# Patient Record
Sex: MALE | Race: WHITE | NOT HISPANIC OR LATINO | Employment: OTHER | URBAN - METROPOLITAN AREA
[De-identification: names, ages, dates, MRNs, and addresses within clinical notes are randomized per-mention and may not be internally consistent; named-entity substitution may affect disease eponyms.]

---

## 2017-05-03 ENCOUNTER — RECORDS - HEALTHEAST (OUTPATIENT)
Dept: LAB | Facility: CLINIC | Age: 67
End: 2017-05-03

## 2017-05-03 LAB
CHOLEST SERPL-MCNC: 121 MG/DL
FASTING STATUS PATIENT QL REPORTED: YES
HDLC SERPL-MCNC: 34 MG/DL
LDLC SERPL CALC-MCNC: 66 MG/DL
PSA SERPL-MCNC: 0.6 NG/ML (ref 0–4.5)
TRIGL SERPL-MCNC: 103 MG/DL

## 2017-05-04 ENCOUNTER — RECORDS - HEALTHEAST (OUTPATIENT)
Dept: LAB | Facility: CLINIC | Age: 67
End: 2017-05-04

## 2017-05-04 LAB
HCV AB SERPL QL IA: NEGATIVE
HIV 1+2 AB+HIV1 P24 AG SERPL QL IA: NEGATIVE

## 2017-09-13 ENCOUNTER — RECORDS - HEALTHEAST (OUTPATIENT)
Dept: ADMINISTRATIVE | Facility: OTHER | Age: 67
End: 2017-09-13

## 2017-09-13 ENCOUNTER — COMMUNICATION - HEALTHEAST (OUTPATIENT)
Dept: SCHEDULING | Facility: CLINIC | Age: 67
End: 2017-09-13

## 2018-05-18 ENCOUNTER — COMMUNICATION - HEALTHEAST (OUTPATIENT)
Dept: SCHEDULING | Facility: CLINIC | Age: 68
End: 2018-05-18

## 2018-06-14 ENCOUNTER — RECORDS - HEALTHEAST (OUTPATIENT)
Dept: LAB | Facility: CLINIC | Age: 68
End: 2018-06-14

## 2018-06-14 LAB
ALBUMIN SERPL-MCNC: 4 G/DL (ref 3.5–5)
ALP SERPL-CCNC: 88 U/L (ref 45–120)
ALT SERPL W P-5'-P-CCNC: 33 U/L (ref 0–45)
ANION GAP SERPL CALCULATED.3IONS-SCNC: 11 MMOL/L (ref 5–18)
AST SERPL W P-5'-P-CCNC: 21 U/L (ref 0–40)
BILIRUB SERPL-MCNC: 0.3 MG/DL (ref 0–1)
BUN SERPL-MCNC: 13 MG/DL (ref 8–22)
CALCIUM SERPL-MCNC: 9.6 MG/DL (ref 8.5–10.5)
CHLORIDE BLD-SCNC: 106 MMOL/L (ref 98–107)
CHOLEST SERPL-MCNC: 128 MG/DL
CO2 SERPL-SCNC: 25 MMOL/L (ref 22–31)
CREAT SERPL-MCNC: 1.12 MG/DL (ref 0.7–1.3)
FASTING STATUS PATIENT QL REPORTED: NO
GFR SERPL CREATININE-BSD FRML MDRD: >60 ML/MIN/1.73M2
GLUCOSE BLD-MCNC: 207 MG/DL (ref 70–125)
HDLC SERPL-MCNC: 36 MG/DL
LDLC SERPL CALC-MCNC: 71 MG/DL
POTASSIUM BLD-SCNC: 4.6 MMOL/L (ref 3.5–5)
PROT SERPL-MCNC: 6.9 G/DL (ref 6–8)
PSA SERPL-MCNC: 0.3 NG/ML (ref 0–4.5)
SODIUM SERPL-SCNC: 142 MMOL/L (ref 136–145)
TRIGL SERPL-MCNC: 105 MG/DL

## 2019-09-03 ENCOUNTER — RECORDS - HEALTHEAST (OUTPATIENT)
Dept: LAB | Facility: CLINIC | Age: 69
End: 2019-09-03

## 2019-09-03 LAB
ALBUMIN SERPL-MCNC: 4 G/DL (ref 3.5–5)
ALP SERPL-CCNC: 79 U/L (ref 45–120)
ALT SERPL W P-5'-P-CCNC: 28 U/L (ref 0–45)
ANION GAP SERPL CALCULATED.3IONS-SCNC: 15 MMOL/L (ref 5–18)
AST SERPL W P-5'-P-CCNC: 24 U/L (ref 0–40)
BILIRUB SERPL-MCNC: 0.4 MG/DL (ref 0–1)
BUN SERPL-MCNC: 13 MG/DL (ref 8–22)
CALCIUM SERPL-MCNC: 9.7 MG/DL (ref 8.5–10.5)
CHLORIDE BLD-SCNC: 104 MMOL/L (ref 98–107)
CHOLEST SERPL-MCNC: 191 MG/DL
CO2 SERPL-SCNC: 20 MMOL/L (ref 22–31)
CREAT SERPL-MCNC: 1.13 MG/DL (ref 0.7–1.3)
FASTING STATUS PATIENT QL REPORTED: NO
GFR SERPL CREATININE-BSD FRML MDRD: >60 ML/MIN/1.73M2
GLUCOSE BLD-MCNC: 234 MG/DL (ref 70–125)
HDLC SERPL-MCNC: 27 MG/DL
LDLC SERPL CALC-MCNC: 125 MG/DL
POTASSIUM BLD-SCNC: 5.2 MMOL/L (ref 3.5–5)
PROT SERPL-MCNC: 7.2 G/DL (ref 6–8)
PSA SERPL-MCNC: 0.3 NG/ML (ref 0–4.5)
SODIUM SERPL-SCNC: 139 MMOL/L (ref 136–145)
TRIGL SERPL-MCNC: 197 MG/DL

## 2020-04-28 ENCOUNTER — RECORDS - HEALTHEAST (OUTPATIENT)
Dept: LAB | Facility: CLINIC | Age: 70
End: 2020-04-28

## 2020-04-28 LAB
ANION GAP SERPL CALCULATED.3IONS-SCNC: 13 MMOL/L (ref 5–18)
BUN SERPL-MCNC: 26 MG/DL (ref 8–22)
CALCIUM SERPL-MCNC: 9.4 MG/DL (ref 8.5–10.5)
CHLORIDE BLD-SCNC: 104 MMOL/L (ref 98–107)
CO2 SERPL-SCNC: 25 MMOL/L (ref 22–31)
CREAT SERPL-MCNC: 1.28 MG/DL (ref 0.7–1.3)
GFR SERPL CREATININE-BSD FRML MDRD: 56 ML/MIN/1.73M2
GLUCOSE BLD-MCNC: 298 MG/DL (ref 70–125)
POTASSIUM BLD-SCNC: 3.7 MMOL/L (ref 3.5–5)
SODIUM SERPL-SCNC: 142 MMOL/L (ref 136–145)

## 2020-04-30 ENCOUNTER — ALLIED HEALTH/NURSE VISIT (OUTPATIENT)
Dept: PHARMACY | Facility: PHYSICIAN GROUP | Age: 70
End: 2020-04-30
Payer: COMMERCIAL

## 2020-04-30 DIAGNOSIS — M54.9 BACK PAIN, UNSPECIFIED BACK LOCATION, UNSPECIFIED BACK PAIN LATERALITY, UNSPECIFIED CHRONICITY: ICD-10-CM

## 2020-04-30 DIAGNOSIS — Z79.4 TYPE 2 DIABETES MELLITUS WITHOUT COMPLICATION, WITH LONG-TERM CURRENT USE OF INSULIN (H): Primary | ICD-10-CM

## 2020-04-30 DIAGNOSIS — E78.5 HYPERLIPIDEMIA LDL GOAL <100: ICD-10-CM

## 2020-04-30 DIAGNOSIS — F31.9 BIPOLAR 1 DISORDER (H): ICD-10-CM

## 2020-04-30 DIAGNOSIS — J45.909 UNCOMPLICATED ASTHMA, UNSPECIFIED ASTHMA SEVERITY, UNSPECIFIED WHETHER PERSISTENT: ICD-10-CM

## 2020-04-30 DIAGNOSIS — F51.05 INSOMNIA DUE TO OTHER MENTAL DISORDER: ICD-10-CM

## 2020-04-30 DIAGNOSIS — E11.9 TYPE 2 DIABETES MELLITUS WITHOUT COMPLICATION, WITH LONG-TERM CURRENT USE OF INSULIN (H): Primary | ICD-10-CM

## 2020-04-30 DIAGNOSIS — I10 BENIGN ESSENTIAL HYPERTENSION: ICD-10-CM

## 2020-04-30 DIAGNOSIS — F99 INSOMNIA DUE TO OTHER MENTAL DISORDER: ICD-10-CM

## 2020-04-30 PROCEDURE — 99605 MTMS BY PHARM NP 15 MIN: CPT | Performed by: PHARMACIST

## 2020-04-30 PROCEDURE — 99607 MTMS BY PHARM ADDL 15 MIN: CPT | Performed by: PHARMACIST

## 2020-04-30 RX ORDER — OLANZAPINE 10 MG/1
10 TABLET ORAL AT BEDTIME
COMMUNITY

## 2020-04-30 RX ORDER — GABAPENTIN 400 MG/1
1200 CAPSULE ORAL 2 TIMES DAILY
COMMUNITY

## 2020-04-30 RX ORDER — TRAZODONE HYDROCHLORIDE 150 MG/1
300 TABLET ORAL AT BEDTIME
COMMUNITY

## 2020-04-30 RX ORDER — LAMOTRIGINE 100 MG/1
300 TABLET ORAL EVERY EVENING
COMMUNITY

## 2020-04-30 RX ORDER — VENLAFAXINE HYDROCHLORIDE 75 MG/1
225 CAPSULE, EXTENDED RELEASE ORAL DAILY
COMMUNITY

## 2020-04-30 RX ORDER — MONTELUKAST SODIUM 10 MG/1
10 TABLET ORAL AT BEDTIME
COMMUNITY

## 2020-04-30 RX ORDER — ARIPIPRAZOLE 20 MG/1
20 TABLET ORAL EVERY EVENING
COMMUNITY

## 2020-04-30 RX ORDER — METFORMIN HCL 500 MG
500 TABLET, EXTENDED RELEASE 24 HR ORAL 2 TIMES DAILY WITH MEALS
COMMUNITY

## 2020-04-30 RX ORDER — ATORVASTATIN CALCIUM 10 MG/1
10 TABLET, FILM COATED ORAL EVERY EVENING
COMMUNITY

## 2020-04-30 RX ORDER — LISINOPRIL 10 MG/1
20 TABLET ORAL DAILY
COMMUNITY
End: 2024-06-28

## 2020-04-30 RX ORDER — ZOLPIDEM TARTRATE 10 MG/1
5 TABLET ORAL AT BEDTIME
Status: ON HOLD | COMMUNITY
End: 2023-01-02

## 2020-04-30 RX ORDER — ESCITALOPRAM OXALATE 20 MG/1
20 TABLET ORAL EVERY EVENING
COMMUNITY

## 2020-04-30 NOTE — PROGRESS NOTES
MTM ENCOUNTER  SUBJECTIVE/OBJECTIVE:                           Montez Cabrera is a 69 year old male called for a transitions of care visit. He was discharged from [facility]  to  acute respiratory failure with hypoxia, acute asthma exacerbation. {mtisitdetails:988975}     Wife angie    Medications   Name strength formulation, Sig: take route frequency   Increase glimepiride 4 mg tablet, Si tab(s) orally 2 times daily (before meals) Start Date: 2019 Stop Date: 2021    Continue Lantus Solostar Pen 100 units/mL solution, Si units subcutaneously 2 times a day (morning and bedtime) Start Date: 10/29/2019    Continue Metformin Hydrochloride  Tablet 24 Hour Sustained Release, Sig: TAKE 2 TABLETS BY MOUTH TWICE DAILY 2 tabs AM and PM     Start guaifenesin 100 mg/5 mL liquid, Sig: 10 ml orally every 4 hours as needed for cough Start Date: 2020    Taking trazodone 150mg tablet, Si tabs orally once a day at bed time    Taking One Touch Ultra lancets lancets, Sig: as directed once daily    Taking one touch ultra 2 glucometer , Sig: as directed Start Date: 2013    Taking olanzapine 10 mg tablet, Si tab(s) orally once a day at bedtime    Taking Novofine as directed UF pen needles 32g x 4, Sig: as directed as directed Start Date: 2017    Taking Neurontin 400mg capsule, Sig: 3 caps orally 2 times a day (morning and bedtime)    Taking naproxen 500 MG Tablet, Si tab(s) orally 2 times a day    Taking Lipitor 10 Tablet, Si tab(s) orally once a day    Taking Lexapro 20 mg tablet, Si tab(s) orally once a day    Taking Lamictal 100mg , Sig: 3 tabs daily at bedtime    Taking Effexor XR 75 mg capsule, extended release, Si tabs in morning and 1 tab at bedtime orally     Taking DME:Nebulizer , Sig: as directed Start Date: 2018    Taking Diabetic Test Strips - Strips, Sig: as directed once daily    Taking Ambien 10 mg , Si/2 tab Oral daily at bedtime   "  Taking Abilify 20 mg tablet, Si tab(s) orally once a day Start Date: 2020    Continue lisinopril 10 mg tablet, Si tab(s) orally once a day in morning Start Date: 2020    Continue montelukast 10 mg tablet, Si tab(s) orally once a day    Continue prednisone 20 mg tablet, Si tabs orally once a day with meal Start Date: 2020 Stop Date: 2020    Continue Albuterol solution for nebulizer 0.083% premix neb solution, Sig: 3 ml inhaled Q6H as needed for cough      1. Hospital Follow [facility] /asthma. 2. DIABETES CHECK DUE- had an increase of Lantus to 25 units twice daily. 3. Hypertension: Started Lisinopril because of diabetes- CHECK BMP TODAY. 4. Hyperlipidemia- restarted Lipitor. 5. Discuss insulin. 6. Phelgm in the throat.   Patient with type 2 diabetes, questionable control previously with last A1c of 8.8 on 2019- very high sugars in the hospital. Taking metformin 2 tabs twice daily, glimepride 1 tab am, 1/2 tab pm, and Lantus. THe lantus was titrated up from 10 units to 25 units prior to hospitalization, then to 25 units twice daily in the hospital Sugars have been up and down at home 100-300 depending on timing.        Patient consented to a telehealth visit: {YES(DEFAULT)/NO/MULT:929881::\"yes\"}  Telemedicine Visit Details  [facility] of service:  [facility]::\"Telephone visit\"}  Start Time: {video/phone visit start time:1529}  End Time: {video/phone visit end time:1529}  Originating Location (pt. Location): {video visit patient location:962064::\"Home\"}  Distant Location (provider location):  [facility]  Mode of Communication:  {telemedmtm2:657931::\"Telephone\"}    Chief Complaint: ***.  Meghan is wondering what time of day he should take his medications.  Personal Healthcare Goals: ***    Allergies/ADRs: Erythromycin - diarrhea, Guy-Dur - stomach upset, Wellbutrin - agressive     Tobacco:  has no history on file for " tobacco.{Tobacco Cessation needed for ACO -- Delete if patient is a non-smoker:006052}  Alcohol: {ALCOHOL CONSUMPTION HX:355942}  Caffeine: {CAFFEINE INTAKE:882604}  Activity: ***  PMH: {1/2/3/4/5:895204}    Medication Adherence/Access:   Pt takes his AM meds around 10:30-12, evening meds would be bedtime.  Pt doesn't eat much, usually just a small bar   Patient {medadmin:276937}.      HPI:  Pt presented to ED with dypsnea.  Pt was diagnosed with acute respiratory failure with hypoxia secondary to asthma exacerbation.  His chest x-ray was negative for pneumonia., was negative for COVID19.  Pt improved on nebs, oxygen support, and IV prednisone.  Was switched to an oral prednisone taper. Pt had chest pains, but was likely secondary to coughing. Echo was unremarkable with EF of 60%.  Pt's BP was elevated in the hospital, and was started on lisinopril with diabetes.    - Elevated hemoglobin A1c to 9.2  - Worsening blood sugar today because of steroid  - Change sliding scale to resistant  - Add carb counting insulin coverage  - Increase Lantus to 25 units twice daily        [facility] 4/23 to 4/25 acute respiratory failure with hypoxia, acute asthma exacerbation, chest pain, DM 2, HTN.  START MEDS: lisinopril, prednisone.  CHANGE MEDS: lantus.  Monitor blood sugar and BP outpatient, follow up with Pulmonology, PCP with BMP and MTM referral.  Discharged home.     ***: ***  ***: ***  ***: ***  ***: ***  ***: ***    Today's Vitals: There were no vitals taken for this visit.      ASSESSMENT:                          {mtmpartdquestion:035526}    Medication Adherence: {adherenceassess:859769}, {ADHERENCEOPTIONSASSES:370553}    ***: ***  ***: ***  ***: ***  ***: ***  ***: ***    PLAN:                          {Remind patient about MTM survey:818321}{AL?:128489}  ***    I spent {time:117326} with this patient today{MTMpartdbillingquestion:841275}. { :106835}. A copy of the visit note was provided to the patient's {ccd  "chart:360824} provider.    Will follow up in ***.    The patient {GIVEN/NOT GIVEN:994692::\"was given\"} a summary of these recommendations. {covisit:425522}    ***          "

## 2020-05-05 RX ORDER — GLIMEPIRIDE 4 MG/1
4 TABLET ORAL 2 TIMES DAILY
Status: ON HOLD | COMMUNITY
End: 2023-01-02

## 2020-05-05 RX ORDER — ALBUTEROL SULFATE 90 UG/1
2 AEROSOL, METERED RESPIRATORY (INHALATION) 2 TIMES DAILY PRN
COMMUNITY

## 2020-05-05 NOTE — PROGRESS NOTES
[facility] ENCOUNTER  SUBJECTIVE/OBJECTIVE:                           Montez Cabrera is a 69 year old male called for a transitions of care visit. He was discharged from [facility] 4/23 to 4/25 acute respiratory failure with hypoxia, acute asthma exacerbation. Patient was accompanied by his wife Meghan.     Patient consented to a telehealth visit: yes  Telemedicine Visit Details  Type of service:  Telephone visit  Start Time: 8:31  End Time: 9:27  Originating Location (pt. Location): Home  Distant Location (provider location):  [facility]  Mode of Communication:  Telephone    Chief Complaint: Medication review after hospital discharge.  Meghan is wondering what time of day he should take his medications.  Patient is feeling very groggy/tired after taking his morning medications.    Allergies/ADRs: Erythromycin - diarrhea, Guy-Dur - stomach upset, Wellbutrin - agressive   Tobacco: Quit in his 20's, smoked for about 8 years.   Alcohol: not currently using  Caffeine: not discussed  PMH: Reviewed in ECW    Medication Adherence/Access:   Pt takes his AM meds around 10:30-12, evening meds would be bedtime.  Pt doesn't eat much, usually just a small snack bar.    Patient uses pill box(es).  His wife Meghan sets them up for him.  He sometimes forgets the morning doses of medications.        HPI:  Pt presented to ED with dypsnea.  Pt was diagnosed with acute respiratory failure with hypoxia secondary to asthma exacerbation.  His chest x-ray was negative for pneumonia., was negative for COVID19.  Pt improved on nebs, oxygen support, and IV prednisone.  Was switched to an oral prednisone taper. Pt had chest pains, but was likely secondary to coughing. Echo was unremarkable with EF of 60%.  Pt's BP was elevated in the hospital, and was started on lisinopril with diabetes.    - Elevated hemoglobin A1c to 9.2  - Worsening blood sugar today because of steroid  - Change sliding scale to resistant  - Add carb  counting insulin coverage  - Increase Lantus to 25 units twice daily  - Pt was started on lisinopril and prednisone at discharge.    Type 2 Diabetes:    Pt currently taking Metformin ER 1000 mg twice daily, Glimepiride 4 mg twice daily, Lantus 25 units twice daily. Pt is not experiencing side effects.   Pt was on the Ozempic sample for about 6 weeks at the end of last year, but it was too expensive to continue.  SMB time(s) daily. Ranges (patient reported):  AM fasting.  2 hour post prandial: 200-300's, after dinner or lunch (spoke to pt about this on )  Symptoms of low blood sugar? shaky, dizzy, weak, sweaty, Frequency of lows- no symptoms recently.   Symptoms of high blood sugar? none  Eye exam: due  Foot exam: due  Diet/Exercise: Dinner - varies a lot.  Has different types of meat, a potatoes, a salad, veggie.  He likes sweets, and doesn't have a lot of will power.  He is hungry all of the time.  She is thinking about doing Nutrisystem for diabetic patients for herself and Montez.  She is doing   Statin: Yes: Atorvastatin 10 mg   ACEi/ARB: Yes: Lisinopril 10 mg daily.   Aspirin:  Patient isn't taking aspirin, they don't remember why it was stopped.  Will review patient's medication history.      GLYCOSYLATED HGB A1C - 2020      NAME VALUE REFERENCE RANGE LAB   F HGB A1C 9.1 H         Back Pain:   Currently taking Gabapentin 1200 mg twice daily.  Pt has found this very helpful for his pain.  Reports sedation in the morning after taking it.  Patient is wondering if he could take a higher dose at bedtime instead to help improve the tiredness during the day.  They were thinking they could possibly move 1 of the 400 mg capsules only to the bedtime dose.    Asthma:    Current asthma medications: Short-Acting Bronchodilator: Albuterol MDI 1 puff twice daily, Ipratropium MDI 1 puff twice daily.  Asthma triggers include: smoke, pollens, animal dander and strong odors and fumes.  Patient's wife Meghan  requests refills of the Albuterol and Atrovent inhalers.  She Atrovent wasn't listed on the discharge summary, but she checked on the name of the inhaler to see what medication it was.  Patient is also taking montelukast 10 mg daily at bedtime.  Breathing is a lot better with the prednisone, which is now completed. He is still getting phlegm in his throat.  He usually get breathing issue in the spring.  He is noticing some small amount draining his.    Pt reports the following symptoms: none.  AAP on file: YES    Bipolar 1 Depression/Insomnia:    Current medications include: Escitalopram 20 mg once daily, Venlafaxine 150mg AM and 75 mg PM, zolpidem 5 mg at bedtime, trazodone 300 mg at bedtime,  lamotrigine 300 mg daily at bedtime, olanzapine 10 mg at bedtime, and aripiprazole 20 mg at bedtime.  Pt reports that depression symptoms are stable.  Denies symptoms of uziel, decreased need for sleep, or anxiety.  They have spoken to the psychiatrist in the past about possibility to reduce the number of medications.  One possibility was to transition to just one antipsychotic medication or one antidepressant medication, however this regimen seems to have worked best for stabilizing mood and at this time they would not like to change his medications.  Patient reports likely side effect of olanzapine: Has increased hunger, weight gain, and just never really feels full after meals.    No flowsheet data found.     Hyperlipidemia:   Current therapy includes Atorvastatin 10mg once daily.  Pt reports no significant myalgias or other side effects.  ASCVD 10 year risk:  42.7%    LIPID CASCADE - 09/03/2019      NAME VALUE REFERENCE RANGE    CHOLESTEROL 191 <=199 (mg/dL)    TRIGLYCERIDES 197 H <=149 (mg/dL)    HDL CHOLESTEROL 27 L >=40 (mg/dL)    LDL CHOLESTEROL CALCULATED 125 <=129 (mg/dL)    FASTING? No        Hypertension:   Current medications include lisinopril 10 mg daily.  This was started a few days ago after his hospital  discharge.  Dr. Romero's appointment they be checked BMP after starting lisinopril.  Patient does not self-monitor BP.  Patient reports no current medication side effects.  No dry cough or throat irritation, however just recently started this medication.  When he does have a cough it is productive typically associated with his asthma.      BASIC METABOLIC PROFILE - 04/28/2020       NAME VALUE REFERENCE RANGE    SODIUM 142 136-145 (mmol/L)    POTASSIUM 3.7 3.5-5.0 (mmol/L)    CHLORIDE 104  (mmol/L)    CO2 25 22-31 (mmol/L)    ANION GAP, CALCULATION 13 5-18 (mmol/L)    GLUCOSE 298 H  (mg/dL)    CALCIUM 9.4 8.5-10.5 (mg/dL)    BLOOD UREA NITROGEN 26 H 8-22 (mg/dL)    CREATININE 1.28 0.70-1.30 (mg/dL)    GFR MDRD AF AMER >60 >60 (mL/min/1.73m2)    GFR MDRD NON AF AMER 56 L >60 (mL/min/1.73m2)       Last Vitals 4/28/2020:  Vitals: Ht 64, Wt 197, BMI 33.81, Comments HT/WT with shoes, Pulse 81, /62, Arm / Cuff size right large, Initials dv      Today's Vitals: There were no vitals taken for this visit. - telephone encounter.      ASSESSMENT:                              Medication Adherence: good, no issues identified      Type 2 Diabetes:   Needs Improvement. Patient is not meeting A1c goal of < 7%. Self monitoring of blood glucose is at goal of fasting  mg/dL. Pt would benefit from check 2 hours post prandial BG to see if those are elevated.  Pt's wife Meghan called a few days after initial apt to let me know 2 hr PP BG is running 200-300's.  They would like to make diet changes and monitor how this affects post prandial blood sugars.  Reviewed the Nutrisystem program available for diabetic patients.  This seems like it may be a helpful program if patient and his wife would like to do this.  It consists of smaller portions of meals throughout the day and with low-carb contents for each meal.  If diet changes are not an option to see significant improvement in postprandial blood sugars, it would  likely be beneficial to restart a GLP-1 agonist.  This would help improve his insatiable hunger, decreased appetite, help with weight loss, and postprandial/fasting blood sugars.  Most patients are able to get off of sulfonylureas or reduce her doses significantly.  Due for annual eye exam.  Aspirin therapy is indicated in this patient at dose of 81mg daily. Pt is not taking aspirin.  Reviewed patient's medical history and cannot confirm that aspirin was discontinued by a medical provider.  It was last noted as being on his medication list back in 2011 and at that time Dr. Romero had intended for patient to to continue taking.      Back Pain:   Needs improvement, possible side effect of gabapentin.  Patient's back pain is doing well on gabapentin however is very tired and groggy after taking the morning dose of gabapentin.  It would be okay for them to try moving one 400 mg capsule to bedtime instead to see if he is more alert during the day.  He would then be taking 800 mg in the morning, and 1600 mg at bedtime.  Discussed risks of taking higher doses of gabapentin at one time such as increased sedation, and peripheral edema.  If daytime sedation does not improve would recommend to switch back to his previous regimen of 1200 mg twice daily.    Asthma:   Needs Improvement.  Patient is currently taking too short acting medications, the albuterol and ipratropium MDI inhalers.  Although his breathing has improved he is still very symptomatic and coughing frequently.  If patient needs to continue taking both rescue inhalers on a daily basis may be beneficial for him to start medication therapy with a inhaled corticosteroid such as Flovent, Arnuity, Qvar, or Asmanex depending on insurance coverage.  Will discuss with primary care provider    Bipolar 1 Depression/Insomnia:    Stable.  Patient is taking a few duplicate therapies.  He is on 2 second-generation antipsychotics, and 2 antidepressants although one is a SSRI  and the other is an SNRI.  Olanzapine and Abilify are likely contributing to increased hunger and appetite as well as hyperlipidemia.  However as patient's mood is stable at this time, would not recommend changing his regimen.  Patient is also on high doses of several serotonergic medications: Escitalopram, trazodone, and venlafaxine.  Patient does not experiencing any symptoms of serotonin toxicity and is safe for him to continue with these medications.    Hyperlipidemia:   Needs Improvement. Pt is not on high intensity statin which is indicated based on 2019 ACC/AHA guidelines for lipid management.  Did not discuss this at appointment today, but it may be beneficial to increase the atorvastatin dose to 40 mg if tolerated.  Would recommend rechecking lipid panel after 2 to 3 months of being on higher dose.      Hypertension:   Improved, plan in place.  Patient just recently started lisinopril.  Serum creatinine and potassium were within normal limits when rechecked at follow-up appointment with PCP. Patient is meeting BP goal of < 140/90mmHg.       PLAN:                          Post Discharge Medication Reconciliation Status: discharge medications reconciled and changed, per note/orders (see AVS).    1.  Recommend patient and wife continue to work on diet changes.  They will continue to check 2-hour postprandial blood sugars for the next 2 weeks and then we will follow-up again on home readings.    2.  If postprandial blood sugars are still elevated at next follow-up would recommend starting a GLP-1 agonist, such as Ozempic 0.25 mg weekly for 4 weeks then increase to 2.5 mg once weekly.      3.  Patient requesting refills of Ventolin and Atrovent HFA inhalers.  Recommend also starting Flovent 110 MCG 1 puff twice daily.    4.  Recommend restarting aspirin 81 mg once daily    Future considerations:  5. Recommend consider increasing atorvastatin to 40 mg once daily, and rechecking lipid panel in 2 to 3 months.         I spent 60 minutes with this patient today. I offer these suggestions for consideration by Dr. Pelaez. A copy of the visit note was provided to the patient's primary care provider.    Will follow up in 2 weeks.    The patient was mailed a summary of these recommendations.     Staci Heard PharmD  Medication Therapy Management Pharmacist  Pager: [phone]        Note:  Also spoke with Meghan on 5/5 and added information on patient's 2 hour post prandial BG to my note above.

## 2020-05-05 NOTE — PATIENT INSTRUCTIONS
Recommendations from today's MTM visit:                                                    MTM (medication therapy management) is a service provided by a clinical pharmacist designed to help you get the most of out of your medicines.   Today we reviewed what your medicines are for, how to know if they are working, that your medicines are safe and how to make your medicine regimen as easy as possible.       I will talk to Dr. Pelaez about the below changes, and let you know if he agrees with the plan we discussed:    1.  Recommend continuing to work on diet changes.  Please continue to check blood sugars 2 hours after 1 meal per day for the next two weeks.  Then we will follow-up again on home readings.    2.  If your blood sugar readings are still elevated at next follow-up would recommend starting another medication, such as Ozempic 0.25 mg weekly for 4 weeks then increase to 2.5 mg once weekly.     You could check with your insurance company to see if Ozempic, Trulicity, or Victoza are better covered.  These are all medications but did the same drug class however Ozempic is typically the most effective for blood sugar reduction and effect on appetite.     3.  I will talk to Dr. Romero about refilling the Ventolin and Atrovent inhalers.  We discussed that these medications are typically just as needed medication for shortness of breath, and typically we would want to look at starting a longer acting controller medication such as Flovent or Qvar.  These inhalers are inhaled corticosteroid medications that help reduce inflammation that can happen in the lungs with asthma.    4.  Recommend restarting aspirin 81 mg once daily.  Reviewed your medical history and could not determine or find why aspirin was stopped.  I will discuss with Dr. Romero if he would like you to restart taking aspirin 81 mg.  This is recommended for diabetic patients to help reduce the risk of heart attack and stroke.          It was great  to speak with you today.  I value your experience and would be very thankful for your time with providing feedback on our clinic survey. You may receive a survey via email or text message in the next few days.     Next MTM visit: Telephone appointment on 5/21/2020 at 8:30 AM    To schedule another MTM appointment, please call the clinic directly or you may call the MTM scheduling line at [phone] or toll-free at [phone].     My Clinical Pharmacist's contact information:                                                      It was a pleasure talking with you today!  Please feel free to contact me with any questions or concerns you have.      Staci Heard, PharmD  Medication Therapy Management Pharmacist  Pager: [phone]

## 2020-08-05 ENCOUNTER — TRANSFERRED RECORDS (OUTPATIENT)
Dept: HEALTH INFORMATION MANAGEMENT | Facility: CLINIC | Age: 70
End: 2020-08-05

## 2020-08-05 LAB — HBA1C MFR BLD: 8 % (ref 4.2–6.1)

## 2020-12-16 ENCOUNTER — RECORDS - HEALTHEAST (OUTPATIENT)
Dept: LAB | Facility: CLINIC | Age: 70
End: 2020-12-16

## 2020-12-16 LAB
ALBUMIN SERPL-MCNC: 4.1 G/DL (ref 3.5–5)
ALP SERPL-CCNC: 98 U/L (ref 45–120)
ALT SERPL W P-5'-P-CCNC: 29 U/L (ref 0–45)
ANION GAP SERPL CALCULATED.3IONS-SCNC: 12 MMOL/L (ref 5–18)
AST SERPL W P-5'-P-CCNC: 18 U/L (ref 0–40)
BILIRUB SERPL-MCNC: 0.3 MG/DL (ref 0–1)
BUN SERPL-MCNC: 21 MG/DL (ref 8–28)
CALCIUM SERPL-MCNC: 9.2 MG/DL (ref 8.5–10.5)
CHLORIDE BLD-SCNC: 103 MMOL/L (ref 98–107)
CHOLEST SERPL-MCNC: 136 MG/DL
CO2 SERPL-SCNC: 25 MMOL/L (ref 22–31)
CREAT SERPL-MCNC: 1.18 MG/DL (ref 0.7–1.3)
FASTING STATUS PATIENT QL REPORTED: ABNORMAL
GFR SERPL CREATININE-BSD FRML MDRD: >60 ML/MIN/1.73M2
GLUCOSE BLD-MCNC: 255 MG/DL (ref 70–125)
HDLC SERPL-MCNC: 31 MG/DL
LDLC SERPL CALC-MCNC: 79 MG/DL
POTASSIUM BLD-SCNC: 4.8 MMOL/L (ref 3.5–5)
PROT SERPL-MCNC: 7.2 G/DL (ref 6–8)
SODIUM SERPL-SCNC: 140 MMOL/L (ref 136–145)
TRIGL SERPL-MCNC: 130 MG/DL

## 2021-02-11 ASSESSMENT — MIFFLIN-ST. JEOR: SCORE: 1619.02

## 2021-03-23 ENCOUNTER — TRANSFERRED RECORDS (OUTPATIENT)
Dept: HEALTH INFORMATION MANAGEMENT | Facility: CLINIC | Age: 71
End: 2021-03-23

## 2021-03-23 LAB — HBA1C MFR BLD: 7.8 % (ref 4.2–6.1)

## 2021-04-01 VITALS
BODY MASS INDEX: 35.68 KG/M2 | DIASTOLIC BLOOD PRESSURE: 70 MMHG | HEIGHT: 64 IN | SYSTOLIC BLOOD PRESSURE: 144 MMHG | HEART RATE: 86 BPM | WEIGHT: 209 LBS

## 2021-04-01 RX ORDER — ASPIRIN 81 MG/1
81 TABLET ORAL DAILY
COMMUNITY
End: 2022-12-30

## 2021-05-28 ENCOUNTER — RECORDS - HEALTHEAST (OUTPATIENT)
Dept: ADMINISTRATIVE | Facility: CLINIC | Age: 71
End: 2021-05-28

## 2021-05-31 ENCOUNTER — RECORDS - HEALTHEAST (OUTPATIENT)
Dept: ADMINISTRATIVE | Facility: CLINIC | Age: 71
End: 2021-05-31

## 2021-05-31 VITALS — BODY MASS INDEX: 33.47 KG/M2 | WEIGHT: 195 LBS

## 2021-06-02 ENCOUNTER — RECORDS - HEALTHEAST (OUTPATIENT)
Dept: ADMINISTRATIVE | Facility: CLINIC | Age: 71
End: 2021-06-02

## 2021-06-16 PROBLEM — J45.901 ASTHMA EXACERBATION: Status: ACTIVE | Noted: 2017-09-11

## 2021-06-16 PROBLEM — J96.01 ACUTE RESPIRATORY FAILURE WITH HYPOXIA (H): Status: ACTIVE | Noted: 2020-04-24

## 2021-06-16 PROBLEM — J45.901 ASTHMA EXACERBATION ATTACKS: Status: ACTIVE | Noted: 2020-04-23

## 2021-06-16 PROBLEM — R06.89 RESPIRATORY INSUFFICIENCY: Status: ACTIVE | Noted: 2017-09-11

## 2021-12-16 ENCOUNTER — LAB REQUISITION (OUTPATIENT)
Dept: LAB | Facility: CLINIC | Age: 71
End: 2021-12-16
Payer: COMMERCIAL

## 2021-12-16 DIAGNOSIS — E11.9 TYPE 2 DIABETES MELLITUS WITHOUT COMPLICATIONS (H): ICD-10-CM

## 2021-12-16 LAB
ALBUMIN SERPL-MCNC: 3.4 G/DL (ref 3.5–5)
ALP SERPL-CCNC: 95 U/L (ref 45–120)
ALT SERPL W P-5'-P-CCNC: 21 U/L (ref 0–45)
ANION GAP SERPL CALCULATED.3IONS-SCNC: 9 MMOL/L (ref 5–18)
AST SERPL W P-5'-P-CCNC: 37 U/L (ref 0–40)
BILIRUB SERPL-MCNC: 0.3 MG/DL (ref 0–1)
BUN SERPL-MCNC: 18 MG/DL (ref 8–28)
CALCIUM SERPL-MCNC: 8.7 MG/DL (ref 8.5–10.5)
CHLORIDE BLD-SCNC: 101 MMOL/L (ref 98–107)
CHOLEST SERPL-MCNC: 121 MG/DL
CO2 SERPL-SCNC: 27 MMOL/L (ref 22–31)
CREAT SERPL-MCNC: 1.36 MG/DL (ref 0.7–1.3)
GFR SERPL CREATININE-BSD FRML MDRD: 52 ML/MIN/1.73M2
GLUCOSE BLD-MCNC: 250 MG/DL (ref 70–125)
HDLC SERPL-MCNC: 32 MG/DL
LDLC SERPL CALC-MCNC: 70 MG/DL
POTASSIUM BLD-SCNC: 5.2 MMOL/L (ref 3.5–5)
PROT SERPL-MCNC: 6.8 G/DL (ref 6–8)
SODIUM SERPL-SCNC: 137 MMOL/L (ref 136–145)
TRIGL SERPL-MCNC: 97 MG/DL

## 2021-12-16 PROCEDURE — 80053 COMPREHEN METABOLIC PANEL: CPT | Mod: ORL | Performed by: FAMILY MEDICINE

## 2021-12-16 PROCEDURE — 80061 LIPID PANEL: CPT | Mod: ORL | Performed by: FAMILY MEDICINE

## 2021-12-17 ENCOUNTER — LAB REQUISITION (OUTPATIENT)
Dept: LAB | Facility: CLINIC | Age: 71
End: 2021-12-17
Payer: COMMERCIAL

## 2021-12-17 DIAGNOSIS — Z03.818 ENCOUNTER FOR OBSERVATION FOR SUSPECTED EXPOSURE TO OTHER BIOLOGICAL AGENTS RULED OUT: ICD-10-CM

## 2021-12-17 PROCEDURE — U0003 INFECTIOUS AGENT DETECTION BY NUCLEIC ACID (DNA OR RNA); SEVERE ACUTE RESPIRATORY SYNDROME CORONAVIRUS 2 (SARS-COV-2) (CORONAVIRUS DISEASE [COVID-19]), AMPLIFIED PROBE TECHNIQUE, MAKING USE OF HIGH THROUGHPUT TECHNOLOGIES AS DESCRIBED BY CMS-2020-01-R: HCPCS | Mod: ORL | Performed by: FAMILY MEDICINE

## 2021-12-18 LAB — SARS-COV-2 RNA RESP QL NAA+PROBE: NEGATIVE

## 2022-01-13 ENCOUNTER — LAB REQUISITION (OUTPATIENT)
Dept: LAB | Facility: CLINIC | Age: 72
End: 2022-01-13
Payer: COMMERCIAL

## 2022-01-13 DIAGNOSIS — I10 ESSENTIAL (PRIMARY) HYPERTENSION: ICD-10-CM

## 2022-01-13 LAB
ANION GAP SERPL CALCULATED.3IONS-SCNC: 9 MMOL/L (ref 5–18)
BUN SERPL-MCNC: 20 MG/DL (ref 8–28)
CALCIUM SERPL-MCNC: 9.2 MG/DL (ref 8.5–10.5)
CHLORIDE BLD-SCNC: 103 MMOL/L (ref 98–107)
CO2 SERPL-SCNC: 26 MMOL/L (ref 22–31)
CREAT SERPL-MCNC: 1.33 MG/DL (ref 0.7–1.3)
GFR SERPL CREATININE-BSD FRML MDRD: 57 ML/MIN/1.73M2
GLUCOSE BLD-MCNC: 213 MG/DL (ref 70–125)
POTASSIUM BLD-SCNC: 5.7 MMOL/L (ref 3.5–5)
SODIUM SERPL-SCNC: 138 MMOL/L (ref 136–145)

## 2022-01-13 PROCEDURE — 80048 BASIC METABOLIC PNL TOTAL CA: CPT | Mod: ORL | Performed by: FAMILY MEDICINE

## 2022-03-28 ENCOUNTER — LAB REQUISITION (OUTPATIENT)
Dept: LAB | Facility: CLINIC | Age: 72
End: 2022-03-28
Payer: COMMERCIAL

## 2022-03-28 DIAGNOSIS — E87.5 HYPERKALEMIA: ICD-10-CM

## 2022-03-28 DIAGNOSIS — E53.8 DEFICIENCY OF OTHER SPECIFIED B GROUP VITAMINS: ICD-10-CM

## 2022-03-28 DIAGNOSIS — K30 FUNCTIONAL DYSPEPSIA: ICD-10-CM

## 2022-03-28 LAB
ANION GAP SERPL CALCULATED.3IONS-SCNC: 12 MMOL/L (ref 5–18)
BUN SERPL-MCNC: 16 MG/DL (ref 8–28)
CALCIUM SERPL-MCNC: 9.3 MG/DL (ref 8.5–10.5)
CHLORIDE BLD-SCNC: 105 MMOL/L (ref 98–107)
CO2 SERPL-SCNC: 23 MMOL/L (ref 22–31)
CREAT SERPL-MCNC: 1.12 MG/DL (ref 0.7–1.3)
GFR SERPL CREATININE-BSD FRML MDRD: 70 ML/MIN/1.73M2
GLUCOSE BLD-MCNC: 232 MG/DL (ref 70–125)
POTASSIUM BLD-SCNC: 5.1 MMOL/L (ref 3.5–5)
SODIUM SERPL-SCNC: 140 MMOL/L (ref 136–145)
TSH SERPL DL<=0.005 MIU/L-ACNC: 3.36 UIU/ML (ref 0.3–5)
VIT B12 SERPL-MCNC: <146 PG/ML (ref 213–816)

## 2022-03-28 PROCEDURE — 82607 VITAMIN B-12: CPT | Mod: ORL | Performed by: FAMILY MEDICINE

## 2022-03-28 PROCEDURE — 80048 BASIC METABOLIC PNL TOTAL CA: CPT | Mod: ORL | Performed by: FAMILY MEDICINE

## 2022-03-28 PROCEDURE — 84443 ASSAY THYROID STIM HORMONE: CPT | Mod: ORL | Performed by: FAMILY MEDICINE

## 2022-12-30 ENCOUNTER — APPOINTMENT (OUTPATIENT)
Dept: MRI IMAGING | Facility: HOSPITAL | Age: 72
End: 2022-12-30
Attending: EMERGENCY MEDICINE
Payer: COMMERCIAL

## 2022-12-30 ENCOUNTER — HOSPITAL ENCOUNTER (OUTPATIENT)
Facility: HOSPITAL | Age: 72
Setting detail: OBSERVATION
Discharge: HOME | End: 2023-01-02
Attending: EMERGENCY MEDICINE | Admitting: INTERNAL MEDICINE
Payer: COMMERCIAL

## 2022-12-30 ENCOUNTER — APPOINTMENT (OUTPATIENT)
Dept: CT IMAGING | Facility: HOSPITAL | Age: 72
End: 2022-12-30
Attending: EMERGENCY MEDICINE
Payer: COMMERCIAL

## 2022-12-30 DIAGNOSIS — G45.9 TIA (TRANSIENT ISCHEMIC ATTACK): ICD-10-CM

## 2022-12-30 DIAGNOSIS — R42 DIZZINESS: ICD-10-CM

## 2022-12-30 DIAGNOSIS — E11.649: Primary | ICD-10-CM

## 2022-12-30 DIAGNOSIS — R47.81 SLURRED SPEECH: ICD-10-CM

## 2022-12-30 DIAGNOSIS — R40.0 SOMNOLENCE: ICD-10-CM

## 2022-12-30 LAB
AMMONIA PLAS-SCNC: 12 UMOL/L (ref 16–60)
ANION GAP SERPL CALCULATED.3IONS-SCNC: 7 MMOL/L (ref 7–15)
APTT PPP: 25 SECONDS (ref 22–38)
BASOPHILS # BLD AUTO: 0.1 10E3/UL (ref 0–0.2)
BASOPHILS NFR BLD AUTO: 1 %
BUN SERPL-MCNC: 21.4 MG/DL (ref 8–23)
CALCIUM SERPL-MCNC: 9.4 MG/DL (ref 8.8–10.2)
CHLORIDE SERPL-SCNC: 104 MMOL/L (ref 98–107)
CREAT SERPL-MCNC: 1.3 MG/DL (ref 0.67–1.17)
DEPRECATED HCO3 PLAS-SCNC: 30 MMOL/L (ref 22–29)
EOSINOPHIL # BLD AUTO: 0.5 10E3/UL (ref 0–0.7)
EOSINOPHIL NFR BLD AUTO: 5 %
ERYTHROCYTE [DISTWIDTH] IN BLOOD BY AUTOMATED COUNT: 12 % (ref 10–15)
GFR SERPL CREATININE-BSD FRML MDRD: 58 ML/MIN/1.73M2
GLUCOSE SERPL-MCNC: 135 MG/DL (ref 70–99)
HBA1C MFR BLD: 7.2 %
HCT VFR BLD AUTO: 39.9 % (ref 40–53)
HGB BLD-MCNC: 12.3 G/DL (ref 13.3–17.7)
HOLD SPECIMEN: NORMAL
IMM GRANULOCYTES # BLD: 0.1 10E3/UL
IMM GRANULOCYTES NFR BLD: 1 %
INR PPP: 0.93 (ref 0.85–1.15)
LYMPHOCYTES # BLD AUTO: 1.7 10E3/UL (ref 0.8–5.3)
LYMPHOCYTES NFR BLD AUTO: 19 %
MAGNESIUM SERPL-MCNC: 2.2 MG/DL (ref 1.7–2.3)
MCH RBC QN AUTO: 31.7 PG (ref 26.5–33)
MCHC RBC AUTO-ENTMCNC: 30.8 G/DL (ref 31.5–36.5)
MCV RBC AUTO: 103 FL (ref 78–100)
MONOCYTES # BLD AUTO: 0.5 10E3/UL (ref 0–1.3)
MONOCYTES NFR BLD AUTO: 6 %
NEUTROPHILS # BLD AUTO: 6.4 10E3/UL (ref 1.6–8.3)
NEUTROPHILS NFR BLD AUTO: 68 %
NRBC # BLD AUTO: 0 10E3/UL
NRBC BLD AUTO-RTO: 0 /100
PLATELET # BLD AUTO: 297 10E3/UL (ref 150–450)
POTASSIUM SERPL-SCNC: 5.3 MMOL/L (ref 3.4–5.3)
RBC # BLD AUTO: 3.88 10E6/UL (ref 4.4–5.9)
SARS-COV-2 RNA RESP QL NAA+PROBE: NEGATIVE
SODIUM SERPL-SCNC: 141 MMOL/L (ref 136–145)
TROPONIN T SERPL HS-MCNC: 34 NG/L
TROPONIN T SERPL HS-MCNC: 34 NG/L
TSH SERPL DL<=0.005 MIU/L-ACNC: 3.16 UIU/ML (ref 0.3–4.2)
WBC # BLD AUTO: 9.2 10E3/UL (ref 4–11)

## 2022-12-30 PROCEDURE — 99285 EMERGENCY DEPT VISIT HI MDM: CPT | Mod: 25

## 2022-12-30 PROCEDURE — 84484 ASSAY OF TROPONIN QUANT: CPT | Mod: 91 | Performed by: INTERNAL MEDICINE

## 2022-12-30 PROCEDURE — 85025 COMPLETE CBC W/AUTO DIFF WBC: CPT | Performed by: EMERGENCY MEDICINE

## 2022-12-30 PROCEDURE — 255N000002 HC RX 255 OP 636: Performed by: EMERGENCY MEDICINE

## 2022-12-30 PROCEDURE — 82140 ASSAY OF AMMONIA: CPT | Performed by: INTERNAL MEDICINE

## 2022-12-30 PROCEDURE — 99220 PR INITIAL OBSERVATION CARE,LEVEL III: CPT | Performed by: INTERNAL MEDICINE

## 2022-12-30 PROCEDURE — G0378 HOSPITAL OBSERVATION PER HR: HCPCS

## 2022-12-30 PROCEDURE — 84484 ASSAY OF TROPONIN QUANT: CPT | Performed by: EMERGENCY MEDICINE

## 2022-12-30 PROCEDURE — 250N000013 HC RX MED GY IP 250 OP 250 PS 637: Performed by: INTERNAL MEDICINE

## 2022-12-30 PROCEDURE — 85730 THROMBOPLASTIN TIME PARTIAL: CPT | Performed by: EMERGENCY MEDICINE

## 2022-12-30 PROCEDURE — U0005 INFEC AGEN DETEC AMPLI PROBE: HCPCS | Performed by: EMERGENCY MEDICINE

## 2022-12-30 PROCEDURE — 70553 MRI BRAIN STEM W/O & W/DYE: CPT | Mod: MF

## 2022-12-30 PROCEDURE — 36415 COLL VENOUS BLD VENIPUNCTURE: CPT | Performed by: INTERNAL MEDICINE

## 2022-12-30 PROCEDURE — 83036 HEMOGLOBIN GLYCOSYLATED A1C: CPT | Performed by: INTERNAL MEDICINE

## 2022-12-30 PROCEDURE — 250N000011 HC RX IP 250 OP 636: Performed by: EMERGENCY MEDICINE

## 2022-12-30 PROCEDURE — 250N000009 HC RX 250: Performed by: EMERGENCY MEDICINE

## 2022-12-30 PROCEDURE — G1010 CDSM STANSON: HCPCS

## 2022-12-30 PROCEDURE — A9585 GADOBUTROL INJECTION: HCPCS | Performed by: EMERGENCY MEDICINE

## 2022-12-30 PROCEDURE — 82607 VITAMIN B-12: CPT | Performed by: INTERNAL MEDICINE

## 2022-12-30 PROCEDURE — 83735 ASSAY OF MAGNESIUM: CPT | Performed by: INTERNAL MEDICINE

## 2022-12-30 PROCEDURE — 82310 ASSAY OF CALCIUM: CPT | Performed by: EMERGENCY MEDICINE

## 2022-12-30 PROCEDURE — 80061 LIPID PANEL: CPT | Performed by: INTERNAL MEDICINE

## 2022-12-30 PROCEDURE — 70496 CT ANGIOGRAPHY HEAD: CPT | Mod: MG

## 2022-12-30 PROCEDURE — 36415 COLL VENOUS BLD VENIPUNCTURE: CPT | Performed by: EMERGENCY MEDICINE

## 2022-12-30 PROCEDURE — 85610 PROTHROMBIN TIME: CPT | Performed by: EMERGENCY MEDICINE

## 2022-12-30 PROCEDURE — 82746 ASSAY OF FOLIC ACID SERUM: CPT | Performed by: INTERNAL MEDICINE

## 2022-12-30 PROCEDURE — 84443 ASSAY THYROID STIM HORMONE: CPT | Performed by: INTERNAL MEDICINE

## 2022-12-30 PROCEDURE — C9803 HOPD COVID-19 SPEC COLLECT: HCPCS

## 2022-12-30 PROCEDURE — 93005 ELECTROCARDIOGRAM TRACING: CPT | Performed by: EMERGENCY MEDICINE

## 2022-12-30 PROCEDURE — 70498 CT ANGIOGRAPHY NECK: CPT | Mod: MG

## 2022-12-30 RX ORDER — VENLAFAXINE HYDROCHLORIDE 150 MG/1
150 CAPSULE, EXTENDED RELEASE ORAL
Status: DISCONTINUED | OUTPATIENT
Start: 2022-12-31 | End: 2023-01-02 | Stop reason: HOSPADM

## 2022-12-30 RX ORDER — GLIMEPIRIDE 1 MG/1
4 TABLET ORAL 2 TIMES DAILY
Status: DISCONTINUED | OUTPATIENT
Start: 2022-12-30 | End: 2023-01-02 | Stop reason: HOSPADM

## 2022-12-30 RX ORDER — GABAPENTIN 300 MG/1
1200 CAPSULE ORAL 2 TIMES DAILY
Status: DISCONTINUED | OUTPATIENT
Start: 2022-12-30 | End: 2023-01-02 | Stop reason: HOSPADM

## 2022-12-30 RX ORDER — PROCHLORPERAZINE MALEATE 5 MG
5 TABLET ORAL EVERY 6 HOURS PRN
Status: DISCONTINUED | OUTPATIENT
Start: 2022-12-30 | End: 2023-01-02 | Stop reason: HOSPADM

## 2022-12-30 RX ORDER — ONDANSETRON 2 MG/ML
4 INJECTION INTRAMUSCULAR; INTRAVENOUS EVERY 6 HOURS PRN
Status: DISCONTINUED | OUTPATIENT
Start: 2022-12-30 | End: 2023-01-02 | Stop reason: HOSPADM

## 2022-12-30 RX ORDER — ALBUTEROL SULFATE 5 MG/ML
2.5 SOLUTION RESPIRATORY (INHALATION) ONCE
Status: COMPLETED | OUTPATIENT
Start: 2022-12-30 | End: 2022-12-30

## 2022-12-30 RX ORDER — DEXTROSE MONOHYDRATE 25 G/50ML
25-50 INJECTION, SOLUTION INTRAVENOUS
Status: DISCONTINUED | OUTPATIENT
Start: 2022-12-30 | End: 2023-01-02 | Stop reason: HOSPADM

## 2022-12-30 RX ORDER — ATORVASTATIN CALCIUM 10 MG/1
10 TABLET, FILM COATED ORAL EVERY EVENING
Status: DISCONTINUED | OUTPATIENT
Start: 2022-12-30 | End: 2023-01-02 | Stop reason: HOSPADM

## 2022-12-30 RX ORDER — AMOXICILLIN 250 MG
1-2 CAPSULE ORAL 2 TIMES DAILY
Status: DISCONTINUED | OUTPATIENT
Start: 2022-12-31 | End: 2023-01-02 | Stop reason: HOSPADM

## 2022-12-30 RX ORDER — ALBUTEROL SULFATE 0.83 MG/ML
SOLUTION RESPIRATORY (INHALATION)
Status: DISCONTINUED
Start: 2022-12-30 | End: 2022-12-30 | Stop reason: HOSPADM

## 2022-12-30 RX ORDER — MONTELUKAST SODIUM 10 MG/1
10 TABLET ORAL AT BEDTIME
Status: DISCONTINUED | OUTPATIENT
Start: 2022-12-30 | End: 2023-01-02 | Stop reason: HOSPADM

## 2022-12-30 RX ORDER — LAMOTRIGINE 150 MG/1
300 TABLET ORAL EVERY EVENING
Status: DISCONTINUED | OUTPATIENT
Start: 2022-12-30 | End: 2023-01-02 | Stop reason: HOSPADM

## 2022-12-30 RX ORDER — GADOBUTROL 604.72 MG/ML
9 INJECTION INTRAVENOUS ONCE
Status: COMPLETED | OUTPATIENT
Start: 2022-12-30 | End: 2022-12-30

## 2022-12-30 RX ORDER — ALBUTEROL SULFATE 90 UG/1
2 AEROSOL, METERED RESPIRATORY (INHALATION) 2 TIMES DAILY PRN
Status: DISCONTINUED | OUTPATIENT
Start: 2022-12-30 | End: 2023-01-02 | Stop reason: HOSPADM

## 2022-12-30 RX ORDER — CLOPIDOGREL BISULFATE 75 MG/1
300 TABLET ORAL ONCE
Status: COMPLETED | OUTPATIENT
Start: 2022-12-30 | End: 2022-12-30

## 2022-12-30 RX ORDER — SODIUM CHLORIDE 9 MG/ML
INJECTION, SOLUTION INTRAVENOUS CONTINUOUS PRN
Status: DISCONTINUED | OUTPATIENT
Start: 2022-12-30 | End: 2023-01-02 | Stop reason: HOSPADM

## 2022-12-30 RX ORDER — CLOPIDOGREL BISULFATE 75 MG/1
75 TABLET ORAL DAILY
Status: DISCONTINUED | OUTPATIENT
Start: 2022-12-31 | End: 2023-01-02 | Stop reason: HOSPADM

## 2022-12-30 RX ORDER — PROCHLORPERAZINE 25 MG
12.5 SUPPOSITORY, RECTAL RECTAL EVERY 12 HOURS PRN
Status: DISCONTINUED | OUTPATIENT
Start: 2022-12-30 | End: 2023-01-02 | Stop reason: HOSPADM

## 2022-12-30 RX ORDER — METFORMIN HCL 500 MG
1000 TABLET, EXTENDED RELEASE 24 HR ORAL 2 TIMES DAILY WITH MEALS
Status: DISCONTINUED | OUTPATIENT
Start: 2022-12-30 | End: 2023-01-02 | Stop reason: HOSPADM

## 2022-12-30 RX ORDER — LIRAGLUTIDE 6 MG/ML
1.2 INJECTION SUBCUTANEOUS DAILY
COMMUNITY
End: 2024-06-28

## 2022-12-30 RX ORDER — ACETAMINOPHEN 325 MG/1
650 TABLET ORAL EVERY 4 HOURS PRN
Status: DISCONTINUED | OUTPATIENT
Start: 2022-12-30 | End: 2023-01-02 | Stop reason: HOSPADM

## 2022-12-30 RX ORDER — BRIMONIDINE TARTRATE AND TIMOLOL MALEATE 2; 5 MG/ML; MG/ML
1 SOLUTION OPHTHALMIC 2 TIMES DAILY
COMMUNITY
Start: 2022-12-15

## 2022-12-30 RX ORDER — NICOTINE POLACRILEX 4 MG
15-30 LOZENGE BUCCAL
Status: DISCONTINUED | OUTPATIENT
Start: 2022-12-30 | End: 2023-01-02 | Stop reason: HOSPADM

## 2022-12-30 RX ORDER — ESCITALOPRAM OXALATE 20 MG/1
20 TABLET ORAL EVERY EVENING
Status: DISCONTINUED | OUTPATIENT
Start: 2022-12-30 | End: 2023-01-02 | Stop reason: HOSPADM

## 2022-12-30 RX ORDER — HYDRALAZINE HYDROCHLORIDE 20 MG/ML
10-20 INJECTION INTRAMUSCULAR; INTRAVENOUS
Status: DISCONTINUED | OUTPATIENT
Start: 2022-12-30 | End: 2023-01-02 | Stop reason: HOSPADM

## 2022-12-30 RX ORDER — ONDANSETRON 4 MG/1
4 TABLET, ORALLY DISINTEGRATING ORAL EVERY 6 HOURS PRN
Status: DISCONTINUED | OUTPATIENT
Start: 2022-12-30 | End: 2023-01-02 | Stop reason: HOSPADM

## 2022-12-30 RX ORDER — IOPAMIDOL 755 MG/ML
75 INJECTION, SOLUTION INTRAVASCULAR ONCE
Status: COMPLETED | OUTPATIENT
Start: 2022-12-30 | End: 2022-12-30

## 2022-12-30 RX ORDER — VENLAFAXINE HYDROCHLORIDE 75 MG/1
75 CAPSULE, EXTENDED RELEASE ORAL AT BEDTIME
Status: DISCONTINUED | OUTPATIENT
Start: 2022-12-30 | End: 2023-01-02 | Stop reason: HOSPADM

## 2022-12-30 RX ORDER — ARIPIPRAZOLE 10 MG/1
20 TABLET ORAL EVERY EVENING
Status: DISCONTINUED | OUTPATIENT
Start: 2022-12-30 | End: 2023-01-02 | Stop reason: HOSPADM

## 2022-12-30 RX ORDER — LABETALOL HYDROCHLORIDE 5 MG/ML
10-20 INJECTION, SOLUTION INTRAVENOUS EVERY 10 MIN PRN
Status: DISCONTINUED | OUTPATIENT
Start: 2022-12-30 | End: 2023-01-02 | Stop reason: HOSPADM

## 2022-12-30 RX ADMIN — GLIMEPIRIDE 4 MG: 1 TABLET ORAL at 23:52

## 2022-12-30 RX ADMIN — ATORVASTATIN CALCIUM 10 MG: 10 TABLET, FILM COATED ORAL at 23:59

## 2022-12-30 RX ADMIN — ESCITALOPRAM OXALATE 20 MG: 20 TABLET ORAL at 23:52

## 2022-12-30 RX ADMIN — VENLAFAXINE HYDROCHLORIDE 75 MG: 75 CAPSULE, EXTENDED RELEASE ORAL at 23:53

## 2022-12-30 RX ADMIN — ALBUTEROL SULFATE 2.5 MG: 2.5 SOLUTION RESPIRATORY (INHALATION) at 22:32

## 2022-12-30 RX ADMIN — METFORMIN ER 500 MG 1000 MG: 500 TABLET ORAL at 23:51

## 2022-12-30 RX ADMIN — GADOBUTROL 9 ML: 604.72 INJECTION INTRAVENOUS at 19:05

## 2022-12-30 RX ADMIN — LAMOTRIGINE 300 MG: 150 TABLET ORAL at 22:36

## 2022-12-30 RX ADMIN — ALBUTEROL SULFATE 2 PUFF: 90 AEROSOL, METERED RESPIRATORY (INHALATION) at 22:40

## 2022-12-30 RX ADMIN — GABAPENTIN 1200 MG: 300 CAPSULE ORAL at 22:36

## 2022-12-30 RX ADMIN — MONTELUKAST 10 MG: 10 TABLET, FILM COATED ORAL at 23:49

## 2022-12-30 RX ADMIN — ARIPIPRAZOLE 20 MG: 10 TABLET ORAL at 23:51

## 2022-12-30 RX ADMIN — ASPIRIN 325 MG: 325 TABLET, COATED ORAL at 22:36

## 2022-12-30 RX ADMIN — CLOPIDOGREL BISULFATE 300 MG: 75 TABLET ORAL at 22:35

## 2022-12-30 RX ADMIN — IOPAMIDOL 75 ML: 755 INJECTION, SOLUTION INTRAVENOUS at 17:22

## 2022-12-30 ASSESSMENT — VISUAL ACUITY: OU: NORMAL ACUITY

## 2022-12-30 ASSESSMENT — ACTIVITIES OF DAILY LIVING (ADL)
ADLS_ACUITY_SCORE: 35
ADLS_ACUITY_SCORE: 35
DEPENDENT_IADLS:: INDEPENDENT
ADLS_ACUITY_SCORE: 35
ADLS_ACUITY_SCORE: 35

## 2022-12-30 NOTE — ED NOTES
Bed: JNED-33  Expected date: 12/30/22  Expected time: 4:25 PM  Means of arrival: Ambulance  Comments:  Vineet 70 yo M speech changes

## 2022-12-30 NOTE — ED TRIAGE NOTES
"Pt arrived with slurred speech. Pt was at a bar with family, pt did not drink. At approx 1500 pt began having slurred speech and dizziness. Wife states pt has early onset dementia and is a Type 2 diabetic. EMS blood sugar was 170. Pt continues to have slurred speech upon arrival. VS normal and pt is A/O x3. Pt able to stand at bedside for weight despite feeling weak.     Pt complains of increased weakness and \"feels very tired\"       "

## 2022-12-30 NOTE — CONSULTS
"[facility]    Stroke Consult Note    Reason for Consult: Stroke Code     Chief Complaint: Stroke Symptoms      HPI    Montez Cabrera is a 72 year old male w/ PMHX of HTN, DM, HLD who presents with acute onset of dysarthria only.   Onset: 2pm with slurred speech and dizziness. He was noted to  have mouth dryness, otherwise no other deficits and was given water, no improvement of dysarthria and stroke code was called.  LKN:   Woke up feeling ok  Started feeling no arm coordination both arms, slurring speech, gait imbalance    RF: HLD, HTN, DM    Imaging Findings  Per personal review: Head CT/CTA showed no acute hemorrhage, no acute stroke and no large vessel occlusion.     Official Radiology read is pending if any acute findings on official report please page back for further management.                                                                         IMPRESSION:   HEAD CT:  1.  No acute intracranial process.     HEAD CTA:   1.  Normal CTA [place] [facility] [place].     NECK CTA:  1.  Normal neck CTA.     Intravenous Thrombolysis  Not given due to:   - minor/isolated/quickly resolving symptoms    Endovascular Treatment  Not initiated due to absence of proximal vessel occlusion    Impression     #Dysarthria- patient presents with dysarthria, no other deficits, he had acute onset at 2pm, no other deficits noted on ED exam. He has multiple vascular risk factors with HTN, DM, HLD. Able to walk to scale for being weighed. His deficits are improving and not a TNK candidate.     Recommendations    MRI brain WWO  If stroke present on MRI then will need further stroke work-up    Patient Follow-up     - final recommendation pending work-up    Thank you for this consult. We will continue to follow.      The Stroke Staff is Dr. Goodman  .    Margarita Hart MD  Vascular Neurology Fellow    To page me or covering stroke neurology team member, click here: [facility]  Choose \"On Call\" tab at top, then " "select \"NEUROLOGY/ALL SITES\" from middle drop-down box, press Enter, then look for \"stroke\" or \"telestroke\" for your site.    ______________________________________________________    Clinically Significant Risk Factors Present on Admission                  # Hypertension: home medication list includes antihypertensive(s)                 Past Medical History   No past medical history on file.  Past Surgical History   Past Surgical History:   Procedure Laterality Date     FOOT SURGERY       OTHER SURGICAL HISTORY      ARM SURGERY     Medications   Home Meds  Prior to Admission medications    Medication Sig Start Date End Date Taking? Authorizing Provider   albuterol (PROAIR HFA/PROVENTIL HFA/VENTOLIN HFA) 108 (90 Base) MCG/ACT inhaler Inhale 2 puffs into the lungs 2 times daily as needed for shortness of breath / dyspnea or wheezing    Reported, Patient   ARIPiprazole (ABILIFY) 20 MG tablet Take 20 mg by mouth every evening    Reported, Patient   aspirin 81 MG EC tablet Take 81 mg by mouth daily    Reported, Patient   atorvastatin (LIPITOR) 10 MG tablet Take 10 mg by mouth daily    Reported, Patient   escitalopram (LEXAPRO) 20 MG tablet Take 20 mg by mouth daily    Reported, Patient   gabapentin (NEURONTIN) 400 MG capsule Take 1,200 mg by mouth 2 times daily    Reported, Patient   glimepiride (AMARYL) 4 MG tablet Take 4 mg by mouth 2 times daily    Reported, Patient   insulin glargine (LANTUS PEN) 100 UNIT/ML pen Inject 25 Units Subcutaneous 2 times daily    Reported, Patient   ipratropium (ATROVENT HFA) 17 MCG/ACT inhaler Inhale 2 puffs into the lungs 2 times daily    Reported, Patient   lamoTRIgine (LAMICTAL) 100 MG tablet Take 300 mg by mouth daily    Reported, Patient   lisinopril (ZESTRIL) 10 MG tablet Take 10 mg by mouth daily    Reported, Patient   metFORMIN (GLUCOPHAGE-XR) 500 MG 24 hr tablet Take 1,000 mg by mouth daily (with dinner)    Reported, Patient   montelukast (SINGULAIR) 10 MG tablet Take 10 mg by " mouth At Bedtime    Reported, Patient   OLANZapine (ZYPREXA) 10 MG tablet Take 10 mg by mouth At Bedtime    Reported, Patient   traZODone (DESYREL) 150 MG tablet Take 300 mg by mouth At Bedtime    Reported, Patient   venlafaxine (EFFEXOR-XR) 75 MG 24 hr capsule Take 150 mg by mouth in the morning and 75 mg at night    Reported, Patient   zolpidem (AMBIEN) 10 MG tablet Take 5 mg by mouth nightly as needed for sleep    Reported, Patient       Scheduled Meds      Infusion Meds      PRN Meds      Allergies   Allergies   Allergen Reactions     Bupropion Diarrhea     Erythromycin Diarrhea     Theophylline GI Disturbance     Family History   No family history on file.  Social History   Social History     Tobacco Use     Smoking status: Former     Types: Cigarettes     Quit date: 1977     Years since quittin.3     Smokeless tobacco: Never   Substance Use Topics     Alcohol use: No     Comment: Alcoholic Drinks/day: quit years ago     Drug use: No       Review of Systems   Review of systems not obtained due to patient factors - critical condition       PHYSICAL EXAMINATION  Pulse:  [82-83] 82  Resp:  [19-33] 19  BP: (155-161)/(75-79) 155/75  SpO2:  [95 %-96 %] 96 %     Neuro Exam  Mental Status:  follows commands, speech clear and fluent, naming and repetition normal, awake but decreased alertness  Cranial Nerves:  visual fields intact (tested by nurse), EOMI with normal smooth pursuit, facial sensation intact and symmetric (tested by nurse), facial movements symmetric, hearing not formally tested but intact to conversation, shoulder shrug equal bilaterally, tongue protrusion midline, mild dysarthria  Motor:  no abnormal movements, able to move all limbs antigravity spontaneously with no signs of hemiparesis observed, no pronator drift  Reflexes:  unable to test (telestroke)  Sensory:  light touch sensation intact and symmetric throughout upper and lower extremities (assessed by nurse), no extinction on double  simultaneous stimulation (assessed by nurse)  Coordination:  normal finger-to-nose and heel-to-shin bilaterally without dysmetria, rapid alternating movements symmetric  Station/Gait:  normal width, turn, reduced arm swing    Dysphagia Screen  Per Nursing    Stroke Scales    NIHSS  1a. Level of Consciousness 1-->Not alert, but arousable by minor stimulation to obey, answer, or respond   1b. LOC Questions 0-->Answers both questions correctly   1c. LOC Commands 0-->Performs both tasks correctly   2.   Best Gaze 0-->Normal   3.   Visual 0-->No visual loss   4.   Facial Palsy 0-->Normal symmetrical movements   5a. Motor Arm, Left 0-->No drift, limb holds 90 (or 45) degrees for full 10 secs   5b. Motor Arm, Right 0-->No drift, limb holds 90 (or 45) degrees for full 10 secs   6a. Motor Leg, Left 0-->No drift, leg holds 30 degree position for full 5 secs   6b. Motor Leg, right 0-->No drift, leg holds 30 degree position for full 5 secs   7.   Limb Ataxia 0-->Absent   8.   Sensory 0-->Normal, no sensory loss   9.   Best Language 0-->No aphasia, normal   10. Dysarthria 1-->Mild-to-moderate dysarthria, patient slurs at least some words and, at worst, can be understood with some difficulty   11. Extinction and Inattention  0-->No abnormality   Total 2 (12/30/22 1725)       Modified Cloud Score (Pre-morbid)    -      Imaging  I personally reviewed all imaging; relevant findings per HPI.     Lab Results Data   CBC  No results for input(s): WBC, RBC, HGB, HCT, PLT in the last 168 hours.  Basic Metabolic Panel    No results for input(s): NA, POTASSIUM, CHLORIDE, CO2, BUN, CR, GLC, JAYME in the last 168 hours.  Liver Panel  No results for input(s): PROTTOTAL, ALBUMIN, BILITOTAL, ALKPHOS, AST, ALT, BILIDIRECT in the last 168 hours.  INR    Recent Labs   Lab Test 04/23/20  1545   INR 1.18*      Lipid Profile    Recent Labs   Lab Test 12/16/21  1127 12/16/20  1026 09/03/19  0922   CHOL 121 136 191   HDL 32* 31* 27*   LDL 70 79 125    TRIG 97 130 197*     A1C    Recent Labs   Lab Test 03/23/21  0000 08/05/20  0000 04/24/20  0602   A1C 7.8* 8.0* 9.2*     Troponin  No results for input(s): CTROPT, TROPONINIS, TROPONINI, GHTROP in the last 168 hours.       Stroke Code Data Data   Stroke Code Data  (for stroke code with tele)  Stroke code activated 12/30/22   1701   First stroke provider response 12/30/22   1702   Video start time 12/30/22   1720   Video end time 12/30/22   1748   Last known normal 12/30/22   1400   Time of discovery  (or onset of symptoms)  12/30/22   1400   Head CT read by Stroke Neuro Dr/Provider 12/30/22   1720   Was stroke code de-escalated? Yes 12/30/22 1750           Telestroke Service Details  Type of service telemedicine diagnostic assessment of acute neurological changes   Reason telemedicine is appropriate patient requires assessment with a specialist for diagnosis and treatment of neurological symptoms   Mode of transmission secure interactive audio and video communication per Cassandra   Originating site (patient location) [facility]    Distant site (provider location) Provider remote site

## 2022-12-30 NOTE — ED PROVIDER NOTES
EMERGENCY DEPARTMENT ENCOUNTER      NAME: Montez Cabrera  AGE: 72 year old male  YOB: 1950  MRN: [id number]  EVALUATION DATE & TIME: 12/30/2022  4:34 PM    PCP: John Pelaez    ED PROVIDER: Meghan Sheppard M.D.    Chief Complaint   Patient presents with     Stroke Symptoms     FINAL IMPRESSION:  1. Slurred speech    2. Dizziness    3. Somnolence      ED COURSE & MEDICAL DECISION MAKING:    Pertinent Labs & Imaging studies reviewed. (See chart for details)  ED Course as of 12/30/22 2035   Fri Dec 30, 2022   1644 I was called to the patient's room for a neuro assessment. I met with the patient, obtained history, performed an initial exam, and discussed options and plan for diagnostics and treatment here in the ED.    1650 I was called overhead to the patient's room.  Apparently he was having some dizziness when standing or trying to stand up starting at 2 PM.  He then was having some difficulty with his speech.  There was some concern about slurred speech.  When I had him stick out his tongue it was very dry.  I asked him if he felt like his dry mouth was contributing to his speech difficulty and he thinks it is so I Jihan have them give him a little bit of water and if it gets better then we will not run him as a stroke code.  I have a high suspicion that that this could just be some dehydration.  He has no numbness or tingling or focal symptoms otherwise.  It is the difficulty understanding his speech.  Is not truly garbled speech though.  Its not words that do not make sense.  He does not seem to have aphasia.  I suspect he may just have some dry mouth.  What brought him in was the dizziness which may be related to some dehydration.  We will get vitals and check some basic labs and plan to give him some IV fluids and then reassess.  I will get an EKG on him as well.  If his speech does not improve with drinking some water then I would run him as a stroke code.  I will check back in on him  shortly.   1656 I rechecked the patient and called a tier 1 stroke code.   1700 I just went back and saw the patient and he is still having slurred speech.  His wife is there now who was able to give some history and says he has not been eating well for some time now but the dizziness and the speech changes definitely a new occurrence as of 2 PM.   1703 I spoke with Stroke Neurology.   1705 I spoke with the stroke neurologist.  They want the cart in there so they can assess the patient.   1727 Radiology called back and reports no abnormality   1751 I spoke with stroke neurology.  We will get an MRI on the patient.  His symptoms are improving.  He is not a candidate for tenecteplase given his improving symptoms and now is falling outside of the optimal time window.   1824 I just spoke with the patient.  We discussed the plan for admission to the hospital and MRI.  They are in agreement.   1834 I spoke with the hospitalist who accepts the patient for admission.     1838 Discussed the case with Dr. Mcallister with hospitalist service who agrees with neuro telemetry observation admission.       Medical Decision Making    History:    Supplemental history from: EMS and Spouse    External Record(s) reviewed: Outpatient Record    Work Up:    Chart documentation includes differential considered and any EKGs or imaging independently interpreted by provider.    In additional to work up documented, I considered the following work up: See chart documentation, if applicable.    External consultation:    Discussion of management with another provider: Stroke Neurology and Hospitalist    Complicating factors:    Care impacted by chronic illness: N/A    Care affected by social determinants of health: N/A    Disposition considerations: Admit.        At the conclusion of the encounter I discussed  the results of all of the tests and the disposition with patient.   All questions were answered.  The patient acknowledged understanding and  was involved in the decision making regarding the overall care plan.      MEDICATIONS GIVEN IN THE EMERGENCY:  Medications   albuterol (PROVENTIL) neb solution 2.5 mg (has no administration in time range)   ARIPiprazole (ABILIFY) tablet 20 mg (has no administration in time range)   atorvastatin (LIPITOR) tablet 10 mg (has no administration in time range)   escitalopram (LEXAPRO) tablet 20 mg (has no administration in time range)   gabapentin (NEURONTIN) capsule 1,200 mg (has no administration in time range)   glimepiride (AMARYL) tablet 4 mg (has no administration in time range)   insulin glargine (LANTUS PEN) injection 30 Units (has no administration in time range)   lamoTRIgine (LaMICtal) tablet 300 mg (has no administration in time range)   metFORMIN (GLUCOPHAGE XR) 24 hr tablet 1,000 mg (has no administration in time range)   montelukast (SINGULAIR) tablet 10 mg (has no administration in time range)   venlafaxine (EFFEXOR XR) 24 hr capsule 150 mg (has no administration in time range)   venlafaxine (EFFEXOR XR) 24 hr capsule 75 mg (has no administration in time range)   glucose gel 15-30 g (has no administration in time range)     Or   dextrose 50 % injection 25-50 mL (has no administration in time range)     Or   glucagon injection 1 mg (has no administration in time range)   insulin aspart (NovoLOG) injection (RAPID ACTING) (has no administration in time range)   insulin aspart (NovoLOG) injection (RAPID ACTING) (has no administration in time range)   iopamidol (ISOVUE-370) solution 75 mL (75 mLs Intravenous Given 12/30/22 1722)   gadobutrol (GADAVIST) injection 9 mL (9 mLs Intravenous Given 12/30/22 1905)       =================================================================    HPI    Triage Note:      Patient information was obtained from: EMS, wife, and patient    Use of : N/A        Montez Moser is a 72 year old male with a history of insulin dependent DM II, HTN, HLD, and asthma, who  presents via EMS for evaluation of dizziness and slurred speech.     Per EMS, patient developed slurred speech after returning home from the bar around 1400 this afternoon. Patient reportedly did not have any alcohol and only drank a Coke. He was not noted to have any drift or facial droop at that time.     Per patient's wife, patient's dizziness and speech change are new as of 1400 this afternoon. He has also had ongoing poor po intake for some time.    Per patient, he endorses dizziness, slurred speech, and generalized weakness that began around 1400 (~2.5 hours PTA). He reports dizziness is only present with positional changes. Patient reports a dry mouth and feels as though this may be attributing to his speech change. Otherwise denies headache, numbness, paresthesias, or any other symptoms or concerns at this time.      REVIEW OF SYSTEMS   Except as stated in the HPI all other systems reviewed and are negative.    PAST MEDICAL HISTORY:  Past Medical History:   Diagnosis Date     Anxiety 04/24/2020     Asthma exacerbation 09/11/2017     Bipolar I disorder (H) 04/24/2020     Controlled type 2 diabetes mellitus, without long-term current use of insulin (H) 09/11/2017     Mixed hyperlipidemia      Recurrent major depressive disorder (H) 09/11/2017     Respiratory insufficiency 09/11/2017       PAST SURGICAL HISTORY:  Past Surgical History:   Procedure Laterality Date     FOOT SURGERY       OTHER SURGICAL HISTORY      ARM SURGERY       CURRENT MEDICATIONS:      Current Facility-Administered Medications:      albuterol (PROVENTIL) neb solution 2.5 mg, 2.5 mg, Nebulization, Once, Meghan Sheppard MD     ARIPiprazole (ABILIFY) tablet 20 mg, 20 mg, Oral, QPM, Arnulfo Mcallister,      atorvastatin (LIPITOR) tablet 10 mg, 10 mg, Oral, QPM, Arnulfo Mcallister,      glucose gel 15-30 g, 15-30 g, Oral, Q15 Min PRN **OR** dextrose 50 % injection 25-50 mL, 25-50 mL, Intravenous, Q15 Min PRN **OR** glucagon  injection 1 mg, 1 mg, Subcutaneous, Q15 Min PRN, Arnulfo Mcallister DO     escitalopram (LEXAPRO) tablet 20 mg, 20 mg, Oral, QPM, Arnulfo Mcallister DO     gabapentin (NEURONTIN) capsule 1,200 mg, 1,200 mg, Oral, BID, Arnulfo Mcallister DO     glimepiride (AMARYL) tablet 4 mg, 4 mg, Oral, BID, Arnulfo Mcallister DO     [START ON 12/31/2022] insulin aspart (NovoLOG) injection (RAPID ACTING), 1-7 Units, Subcutaneous, TID AC, Arnulfo Mcallister DO     insulin aspart (NovoLOG) injection (RAPID ACTING), 1-5 Units, Subcutaneous, At Bedtime, Arnulfo Mcallister DO     insulin glargine (LANTUS PEN) injection 30 Units, 30 Units, Subcutaneous, BID, Arnulfo Mcallister DO     lamoTRIgine (LaMICtal) tablet 300 mg, 300 mg, Oral, QPM, Arnulfo Mcallister DO     metFORMIN (GLUCOPHAGE XR) 24 hr tablet 1,000 mg, 1,000 mg, Oral, BID w/meals, Arnulfo Mcallister DO     montelukast (SINGULAIR) tablet 10 mg, 10 mg, Oral, At Bedtime, Arnulfo Mcallister DO     [START ON 12/31/2022] venlafaxine (EFFEXOR XR) 24 hr capsule 150 mg, 150 mg, Oral, Daily with breakfast, Arnulfo Mcallister DO     venlafaxine (EFFEXOR XR) 24 hr capsule 75 mg, 75 mg, Oral, At Bedtime, Arnulfo Mcallister DO    Current Outpatient Medications:      albuterol (PROAIR HFA/PROVENTIL HFA/VENTOLIN HFA) 108 (90 Base) MCG/ACT inhaler, Inhale 2 puffs into the lungs 2 times daily as needed for shortness of breath / dyspnea or wheezing, Disp: , Rfl:      ARIPiprazole (ABILIFY) 20 MG tablet, Take 20 mg by mouth every evening, Disp: , Rfl:      atorvastatin (LIPITOR) 10 MG tablet, Take 10 mg by mouth every evening, Disp: , Rfl:      brimonidine-timolol (COMBIGAN) 0.2-0.5 % ophthalmic solution, Place 1 drop into both eyes 2 times daily, Disp: , Rfl:      escitalopram (LEXAPRO) 20 MG tablet, Take 20 mg by mouth every evening, Disp: , Rfl:      gabapentin (NEURONTIN) 400 MG capsule, Take 1,200 mg by mouth 2 times daily, Disp: , Rfl:      glimepiride (AMARYL) 4 MG tablet,  Take 4 mg by mouth 2 times daily, Disp: , Rfl:      insulin glargine (LANTUS PEN) 100 UNIT/ML pen, Inject 30 Units Subcutaneous 2 times daily, Disp: , Rfl:      lamoTRIgine (LAMICTAL) 100 MG tablet, Take 300 mg by mouth every evening, Disp: , Rfl:      liraglutide (VICTOZA) 18 MG/3ML solution, Inject 1.2 mg Subcutaneous daily as needed, Disp: , Rfl:      lisinopril (ZESTRIL) 10 MG tablet, Take 20 mg by mouth daily, Disp: , Rfl:      metFORMIN (GLUCOPHAGE-XR) 500 MG 24 hr tablet, Take 1,000 mg by mouth 2 times daily (with meals), Disp: , Rfl:      montelukast (SINGULAIR) 10 MG tablet, Take 10 mg by mouth At Bedtime, Disp: , Rfl:      OLANZapine (ZYPREXA) 10 MG tablet, Take 10 mg by mouth At Bedtime, Disp: , Rfl:      traZODone (DESYREL) 150 MG tablet, Take 300 mg by mouth At Bedtime, Disp: , Rfl:      venlafaxine (EFFEXOR-XR) 75 MG 24 hr capsule, Take 150 mg by mouth in the morning and 75 mg at night, Disp: , Rfl:      zolpidem (AMBIEN) 10 MG tablet, Take 5 mg by mouth At Bedtime, Disp: , Rfl:      ipratropium (ATROVENT HFA) 17 MCG/ACT inhaler, Inhale 2 puffs into the lungs 2 times daily (Patient not taking: Reported on 2022), Disp: , Rfl:     ALLERGIES:  Allergies   Allergen Reactions     Bupropion Diarrhea     Erythromycin Diarrhea     Theophylline GI Disturbance       FAMILY HISTORY:  No family history on file.    SOCIAL HISTORY:   Social History     Socioeconomic History     Marital status:    Tobacco Use     Smoking status: Former     Types: Cigarettes     Quit date: 1977     Years since quittin.3     Smokeless tobacco: Never   Substance and Sexual Activity     Alcohol use: No     Comment: Alcoholic Drinks/day: quit years ago     Drug use: No   Social History Narrative    retired       PHYSICAL EXAM    VITAL SIGNS: BP (!) 155/75   Pulse 82   Resp 19   Wt 89 kg (196 lb 4.8 oz)   SpO2 96%   BMI 33.69 kg/m     GENERAL: Awake, Alert, answering questions, No acute distress, Well  nourished  HEENT: Normal cephalic, Atraumatic, bilateral external ears normal, No scleral icterus, mask in place  NECK: No obvious swelling or abnormality, No stridor  PULMONARY: Symmetric breath sounds, No respiratory distress, Mild diffuse wheezing  CARDIOVASCULAR: Regular rate and rhythm, Distal pulses present and normal.  ABDOMINAL: Soft, Nondistended, Nontender, No flank tenderness, No palpable masses  BACK: No tenderness.  EXTREMITIES: Moves all extremities spontaneously, warm, no edema, No major deformities  NEURO: No facial droop, normal motor function, Normal speech   PSYCH: Normal mood and affect  SKIN: No rashes on visualized skin, dry, warm     LAB:  All pertinent labs reviewed and interpreted.  Results for orders placed or performed during the hospital encounter of 12/30/22   CTA Head Neck with Contrast    Impression    IMPRESSION:   HEAD CT:  1.  No acute intracranial process.    HEAD CTA:   1.  Normal CTA [place].    NECK CTA:  1.  Normal neck CTA.    Results were called to Dr. Sheppard at 12/30/2022 5:26 PM.   MR Brain w/o & w Contrast    Impression    IMPRESSION:  1.  No acute intracranial process.    2.  Generalized brain atrophy and presumed microvascular ischemic changes as detailed above.    3.  Nothing for acute or evolving infarction intracranially.    4.  No pathologic enhancement.    5.  Please see above for details and description.   Extra Red Top Tube   Result Value Ref Range    Hold Specimen JIC    Extra Purple Top Tube   Result Value Ref Range    Hold Specimen JIC    Extra Blue Top Tube   Result Value Ref Range    Hold Specimen JIC    Basic metabolic panel   Result Value Ref Range    Sodium 141 136 - 145 mmol/L    Potassium 5.3 3.4 - 5.3 mmol/L    Chloride 104 98 - 107 mmol/L    Carbon Dioxide (CO2) 30 (H) 22 - 29 mmol/L    Anion Gap 7 7 - 15 mmol/L    Urea Nitrogen 21.4 8.0 - 23.0 mg/dL    Creatinine 1.30 (H) 0.67 - 1.17 mg/dL    Calcium 9.4 8.8 - 10.2 mg/dL    Glucose 135 (H) 70  - 99 mg/dL    GFR Estimate 58 (L) >60 mL/min/1.73m2   Result Value Ref Range    INR 0.93 0.85 - 1.15   Partial thromboplastin time   Result Value Ref Range    aPTT 25 22 - 38 Seconds   Result Value Ref Range    Troponin T, High Sensitivity 34 (H) <=22 ng/L   CBC with platelets and differential   Result Value Ref Range    WBC Count 9.2 4.0 - 11.0 10e3/uL    RBC Count 3.88 (L) 4.40 - 5.90 10e6/uL    Hemoglobin 12.3 (L) 13.3 - 17.7 g/dL    Hematocrit 39.9 (L) 40.0 - 53.0 %     (H) 78 - 100 fL    MCH 31.7 26.5 - 33.0 pg    MCHC 30.8 (L) 31.5 - 36.5 g/dL    RDW 12.0 10.0 - 15.0 %    Platelet Count 297 150 - 450 10e3/uL    % Neutrophils 68 %    % Lymphocytes 19 %    % Monocytes 6 %    % Eosinophils 5 %    % Basophils 1 %    % Immature Granulocytes 1 %    NRBCs per 100 WBC 0 <1 /100    Absolute Neutrophils 6.4 1.6 - 8.3 10e3/uL    Absolute Lymphocytes 1.7 0.8 - 5.3 10e3/uL    Absolute Monocytes 0.5 0.0 - 1.3 10e3/uL    Absolute Eosinophils 0.5 0.0 - 0.7 10e3/uL    Absolute Basophils 0.1 0.0 - 0.2 10e3/uL    Absolute Immature Granulocytes 0.1 <=0.4 10e3/uL    Absolute NRBCs 0.0 10e3/uL       RADIOLOGY:  MR Brain w/o & w Contrast   Final Result   IMPRESSION:   1.  No acute intracranial process.      2.  Generalized brain atrophy and presumed microvascular ischemic changes as detailed above.      3.  Nothing for acute or evolving infarction intracranially.      4.  No pathologic enhancement.      5.  Please see above for details and description.      CTA Head Neck with Contrast   Final Result   IMPRESSION:    HEAD CT:   1.  No acute intracranial process.      HEAD CTA:    1.  Normal CTA [place].      NECK CTA:   1.  Normal neck CTA.      Results were called to Dr. Sheppard at 12/30/2022 5:26 PM.          EKG:    Date and time: December 30, 2022 at 1753  Rate: 80 bpm  Rhythm: Sinus rhythm  NH interval: 162 ms  QRS interval: 86 ms  QT/QTc: 366/422 ms  ST changes or T wave changes: No acute ST or T wave  abnormality  Change from prior ECG: No significant change from prior  I have independently reviewed and interpreted this EKG.     I, Humera Moncada, am serving as a scribe to document services personally performed by Dr. Sheppard based on my observation and the provider's statements to me. I, Meghan Sheppard MD attest that Humera Moncada is acting in a scribe capacity, has observed my performance of the services and has documented them in accordance with my direction.    Meghan Sheppard M.D.  Emergency Medicine  [facility] EMERGENCY DEPARTMENT  [address] [place] [id number]  [phone]  Dept: [phone]      Meghan Sheppard MD  12/30/22 2036

## 2022-12-31 ENCOUNTER — APPOINTMENT (OUTPATIENT)
Dept: OCCUPATIONAL THERAPY | Facility: HOSPITAL | Age: 72
End: 2022-12-31
Attending: INTERNAL MEDICINE
Payer: COMMERCIAL

## 2022-12-31 ENCOUNTER — APPOINTMENT (OUTPATIENT)
Dept: SPEECH THERAPY | Facility: HOSPITAL | Age: 72
End: 2022-12-31
Attending: INTERNAL MEDICINE
Payer: COMMERCIAL

## 2022-12-31 ENCOUNTER — APPOINTMENT (OUTPATIENT)
Dept: PHYSICAL THERAPY | Facility: HOSPITAL | Age: 72
End: 2022-12-31
Attending: INTERNAL MEDICINE
Payer: COMMERCIAL

## 2022-12-31 ENCOUNTER — APPOINTMENT (OUTPATIENT)
Dept: CARDIOLOGY | Facility: HOSPITAL | Age: 72
End: 2022-12-31
Attending: INTERNAL MEDICINE
Payer: COMMERCIAL

## 2022-12-31 LAB
ALBUMIN UR-MCNC: 20 MG/DL
ANION GAP SERPL CALCULATED.3IONS-SCNC: 8 MMOL/L (ref 7–15)
APPEARANCE UR: CLEAR
BILIRUB UR QL STRIP: NEGATIVE
BUN SERPL-MCNC: 20.1 MG/DL (ref 8–23)
CALCIUM SERPL-MCNC: 8.8 MG/DL (ref 8.8–10.2)
CHLORIDE SERPL-SCNC: 102 MMOL/L (ref 98–107)
CHOLEST SERPL-MCNC: 125 MG/DL
COLOR UR AUTO: ABNORMAL
CREAT SERPL-MCNC: 1.22 MG/DL (ref 0.67–1.17)
DEPRECATED HCO3 PLAS-SCNC: 27 MMOL/L (ref 22–29)
ERYTHROCYTE [DISTWIDTH] IN BLOOD BY AUTOMATED COUNT: 11.9 % (ref 10–15)
FOLATE SERPL-MCNC: 12 NG/ML (ref 4.6–34.8)
GFR SERPL CREATININE-BSD FRML MDRD: 63 ML/MIN/1.73M2
GLUCOSE BLDC GLUCOMTR-MCNC: 109 MG/DL (ref 70–99)
GLUCOSE BLDC GLUCOMTR-MCNC: 126 MG/DL (ref 70–99)
GLUCOSE BLDC GLUCOMTR-MCNC: 142 MG/DL (ref 70–99)
GLUCOSE BLDC GLUCOMTR-MCNC: 153 MG/DL (ref 70–99)
GLUCOSE BLDC GLUCOMTR-MCNC: 49 MG/DL (ref 70–99)
GLUCOSE BLDC GLUCOMTR-MCNC: 59 MG/DL (ref 70–99)
GLUCOSE BLDC GLUCOMTR-MCNC: 70 MG/DL (ref 70–99)
GLUCOSE BLDC GLUCOMTR-MCNC: 84 MG/DL (ref 70–99)
GLUCOSE BLDC GLUCOMTR-MCNC: 91 MG/DL (ref 70–99)
GLUCOSE SERPL-MCNC: 56 MG/DL (ref 70–99)
GLUCOSE UR STRIP-MCNC: NEGATIVE MG/DL
HCT VFR BLD AUTO: 33.8 % (ref 40–53)
HDLC SERPL-MCNC: 37 MG/DL
HGB BLD-MCNC: 10.5 G/DL (ref 13.3–17.7)
HGB UR QL STRIP: NEGATIVE
KETONES UR STRIP-MCNC: NEGATIVE MG/DL
LDLC SERPL CALC-MCNC: 72 MG/DL
LEUKOCYTE ESTERASE UR QL STRIP: NEGATIVE
LVEF ECHO: NORMAL
MCH RBC QN AUTO: 31.7 PG (ref 26.5–33)
MCHC RBC AUTO-ENTMCNC: 31.1 G/DL (ref 31.5–36.5)
MCV RBC AUTO: 102 FL (ref 78–100)
NITRATE UR QL: NEGATIVE
NONHDLC SERPL-MCNC: 88 MG/DL
PH UR STRIP: 6 [PH] (ref 5–7)
PLATELET # BLD AUTO: 265 10E3/UL (ref 150–450)
POTASSIUM SERPL-SCNC: 4.8 MMOL/L (ref 3.4–5.3)
RBC # BLD AUTO: 3.31 10E6/UL (ref 4.4–5.9)
RBC URINE: 0 /HPF
SODIUM SERPL-SCNC: 137 MMOL/L (ref 136–145)
SP GR UR STRIP: 1.05 (ref 1–1.03)
TRIGL SERPL-MCNC: 82 MG/DL
UROBILINOGEN UR STRIP-MCNC: <2 MG/DL
VIT B12 SERPL-MCNC: 264 PG/ML (ref 232–1245)
WBC # BLD AUTO: 8.1 10E3/UL (ref 4–11)
WBC URINE: <1 /HPF

## 2022-12-31 PROCEDURE — 97165 OT EVAL LOW COMPLEX 30 MIN: CPT | Mod: GO

## 2022-12-31 PROCEDURE — 80048 BASIC METABOLIC PNL TOTAL CA: CPT | Performed by: INTERNAL MEDICINE

## 2022-12-31 PROCEDURE — G0378 HOSPITAL OBSERVATION PER HR: HCPCS

## 2022-12-31 PROCEDURE — 81001 URINALYSIS AUTO W/SCOPE: CPT | Performed by: INTERNAL MEDICINE

## 2022-12-31 PROCEDURE — 36415 COLL VENOUS BLD VENIPUNCTURE: CPT | Performed by: INTERNAL MEDICINE

## 2022-12-31 PROCEDURE — 97116 GAIT TRAINING THERAPY: CPT | Mod: GP

## 2022-12-31 PROCEDURE — 82962 GLUCOSE BLOOD TEST: CPT

## 2022-12-31 PROCEDURE — 250N000013 HC RX MED GY IP 250 OP 250 PS 637: Performed by: INTERNAL MEDICINE

## 2022-12-31 PROCEDURE — 93306 TTE W/DOPPLER COMPLETE: CPT

## 2022-12-31 PROCEDURE — 999N000226 HC STATISTIC SLP IP EVAL DEFER

## 2022-12-31 PROCEDURE — 99215 OFFICE O/P EST HI 40 MIN: CPT | Performed by: PSYCHIATRY & NEUROLOGY

## 2022-12-31 PROCEDURE — 99225 PR SUBSEQUENT OBSERVATION CARE,LEVEL II: CPT | Mod: 25 | Performed by: INTERNAL MEDICINE

## 2022-12-31 PROCEDURE — 250N000009 HC RX 250: Performed by: INTERNAL MEDICINE

## 2022-12-31 PROCEDURE — 93306 TTE W/DOPPLER COMPLETE: CPT | Mod: 26 | Performed by: INTERNAL MEDICINE

## 2022-12-31 PROCEDURE — 99207 PR CDG-CUT & PASTE-POTENTIAL IMPACT ON LEVEL: CPT | Performed by: INTERNAL MEDICINE

## 2022-12-31 PROCEDURE — 96372 THER/PROPH/DIAG INJ SC/IM: CPT | Performed by: INTERNAL MEDICINE

## 2022-12-31 PROCEDURE — 97161 PT EVAL LOW COMPLEX 20 MIN: CPT | Mod: GP

## 2022-12-31 PROCEDURE — 250N000012 HC RX MED GY IP 250 OP 636 PS 637: Performed by: INTERNAL MEDICINE

## 2022-12-31 PROCEDURE — 85027 COMPLETE CBC AUTOMATED: CPT | Performed by: INTERNAL MEDICINE

## 2022-12-31 RX ORDER — LISINOPRIL 20 MG/1
20 TABLET ORAL DAILY
Status: DISCONTINUED | OUTPATIENT
Start: 2022-12-31 | End: 2023-01-02 | Stop reason: HOSPADM

## 2022-12-31 RX ORDER — BRIMONIDINE TARTRATE AND TIMOLOL MALEATE 2; 5 MG/ML; MG/ML
1 SOLUTION OPHTHALMIC 2 TIMES DAILY
Status: DISCONTINUED | OUTPATIENT
Start: 2022-12-31 | End: 2023-01-02 | Stop reason: HOSPADM

## 2022-12-31 RX ADMIN — ESCITALOPRAM OXALATE 20 MG: 20 TABLET ORAL at 21:20

## 2022-12-31 RX ADMIN — INSULIN GLARGINE 30 UNITS: 100 INJECTION, SOLUTION SUBCUTANEOUS at 00:21

## 2022-12-31 RX ADMIN — LISINOPRIL 20 MG: 20 TABLET ORAL at 16:02

## 2022-12-31 RX ADMIN — CLOPIDOGREL BISULFATE 75 MG: 75 TABLET ORAL at 09:48

## 2022-12-31 RX ADMIN — GABAPENTIN 1200 MG: 300 CAPSULE ORAL at 09:49

## 2022-12-31 RX ADMIN — ALBUTEROL SULFATE 2 PUFF: 90 AEROSOL, METERED RESPIRATORY (INHALATION) at 21:31

## 2022-12-31 RX ADMIN — GABAPENTIN 1200 MG: 300 CAPSULE ORAL at 21:20

## 2022-12-31 RX ADMIN — ARIPIPRAZOLE 20 MG: 10 TABLET ORAL at 21:19

## 2022-12-31 RX ADMIN — VENLAFAXINE HYDROCHLORIDE 150 MG: 150 CAPSULE, EXTENDED RELEASE ORAL at 09:49

## 2022-12-31 RX ADMIN — BRIMONIDINE TARTRATE, TIMOLOL MALEATE 1 DROP: 2; 5 SOLUTION/ DROPS OPHTHALMIC at 21:23

## 2022-12-31 RX ADMIN — ASPIRIN 325 MG: 325 TABLET, COATED ORAL at 09:48

## 2022-12-31 RX ADMIN — ATORVASTATIN CALCIUM 10 MG: 10 TABLET, FILM COATED ORAL at 21:19

## 2022-12-31 RX ADMIN — LAMOTRIGINE 300 MG: 150 TABLET ORAL at 21:21

## 2022-12-31 RX ADMIN — MONTELUKAST 10 MG: 10 TABLET, FILM COATED ORAL at 21:21

## 2022-12-31 RX ADMIN — VENLAFAXINE HYDROCHLORIDE 75 MG: 75 CAPSULE, EXTENDED RELEASE ORAL at 21:22

## 2022-12-31 ASSESSMENT — ACTIVITIES OF DAILY LIVING (ADL)
ADLS_ACUITY_SCORE: 31

## 2022-12-31 NOTE — PHARMACY-ADMISSION MEDICATION HISTORY
Pharmacy Note - Admission Medication History    Pertinent Provider Information: None     ______________________________________________________________________    Prior To Admission (PTA) med list completed and updated in EMR.       PTA Med List   Medication Sig Last Dose     albuterol (PROAIR HFA/PROVENTIL HFA/VENTOLIN HFA) 108 (90 Base) MCG/ACT inhaler Inhale 2 puffs into the lungs 2 times daily as needed for shortness of breath / dyspnea or wheezing      ARIPiprazole (ABILIFY) 20 MG tablet Take 20 mg by mouth every evening 12/29/2022     atorvastatin (LIPITOR) 10 MG tablet Take 10 mg by mouth every evening 12/29/2022     brimonidine-timolol (COMBIGAN) 0.2-0.5 % ophthalmic solution Place 1 drop into both eyes 2 times daily 12/30/2022 at x1 am     escitalopram (LEXAPRO) 20 MG tablet Take 20 mg by mouth every evening 12/29/2022     gabapentin (NEURONTIN) 400 MG capsule Take 1,200 mg by mouth 2 times daily 12/30/2022 at x1 am     glimepiride (AMARYL) 4 MG tablet Take 4 mg by mouth 2 times daily 12/30/2022 at x1 am     insulin glargine (LANTUS PEN) 100 UNIT/ML pen Inject 30 Units Subcutaneous 2 times daily 12/30/2022 at x1 am     lamoTRIgine (LAMICTAL) 100 MG tablet Take 300 mg by mouth every evening 12/29/2022     liraglutide (VICTOZA) 18 MG/3ML solution Inject 1.2 mg Subcutaneous daily as needed      lisinopril (ZESTRIL) 10 MG tablet Take 20 mg by mouth daily 12/30/2022     metFORMIN (GLUCOPHAGE-XR) 500 MG 24 hr tablet Take 1,000 mg by mouth 2 times daily (with meals) 12/30/2022 at x1 am     montelukast (SINGULAIR) 10 MG tablet Take 10 mg by mouth At Bedtime 12/29/2022     OLANZapine (ZYPREXA) 10 MG tablet Take 10 mg by mouth At Bedtime 12/29/2022     traZODone (DESYREL) 150 MG tablet Take 300 mg by mouth At Bedtime 12/29/2022     venlafaxine (EFFEXOR-XR) 75 MG 24 hr capsule Take 150 mg by mouth in the morning and 75 mg at night 12/30/2022 at x1 am     zolpidem (AMBIEN) 10 MG tablet Take 5 mg by mouth At Bedtime  12/29/2022       Information source(s): Family member and CareEverywhere/SureScripts  Method of interview communication: in-person    Summary of Changes to PTA Med List  New: Brimonidine/timolol, Victoza  Discontinued: Aspirin 81mg daily. Marked Atrovent as not taking.  Changed: Lantus 25u to 30u bid, metformin 1000mg every day to bid, lisinopril 10mg to 20mg daily.    Patient was asked about OTC/herbal products specifically.  PTA med list reflects this.    In the past week, patient estimated taking medication this percent of the time:  50-90% due to other.    Allergies were reviewed, assessed, and updated with the patient.      Patient did not bring any medications to the hospital and can't retrieve from home. No multi-dose medications are available for use during hospital stay.     The information provided in this note is only as accurate as the sources available at the time of the update(s).    Thank you for the opportunity to participate in the care of this patient.    Pam Negrete [facility]  12/30/2022 7:01 PM

## 2022-12-31 NOTE — PLAN OF CARE
Goal Outcome Evaluation:  Problem: Plan of Care - These are the overarching goals to be used throughout the patient stay.    Goal: Plan of Care Review  Outcome: Progressing  Goal: Patient-Specific Goal (Individualized)  Outcome: Progressing  Goal: Absence of Hospital-Acquired Illness or Injury  Outcome: Progressing  Intervention: Identify and Manage Fall Risk  Recent Flowsheet Documentation  Taken 12/31/2022 0800 by Lily Ramirez, RN  Safety Promotion/Fall Prevention:   activity supervised   assistive device/personal items within reach   bed alarm on  Intervention: Prevent Skin Injury  Recent Flowsheet Documentation  Taken 12/31/2022 0800 by Lily Ramirez, RN  Body Position: position changed independently  Goal: Optimal Comfort and Wellbeing  Outcome: Progressing  Goal: Readiness for Transition of Care  Outcome: Progressing     Problem: Stroke, Ischemic (Includes Transient Ischemic Attack)  Goal: Optimal Coping  Outcome: Progressing  Goal: Effective Bowel Elimination  Outcome: Progressing  Goal: Optimal Cerebral Tissue Perfusion  Outcome: Progressing  Goal: Optimal Cognitive Function  Outcome: Progressing  Goal: Improved Communication Skills  Outcome: Progressing  Goal: Optimal Functional Ability  Outcome: Progressing  Goal: Effective Oxygenation and Ventilation  Outcome: Progressing  Intervention: Optimize Oxygenation and Ventilation  Recent Flowsheet Documentation  Taken 12/31/2022 0800 by Lily Ramirez, RN  Head of Bed (HOB) Positioning: HOB at 30 degrees  Goal: Improved Sensorimotor Function  Outcome: Progressing  Goal: Optimal Eating and Swallowing without Aspiration  Outcome: Progressing  Goal: Effective Urinary Elimination  Outcome: Progressing

## 2022-12-31 NOTE — PHARMACY-CONSULT NOTE
Pharmacy Consult to evaluate for medication related stroke core measures    Montez Cabrera, 72 year old male admitted for possible stroke on 12/30/2022.    Thrombolytic was not given because of Radiologic contraindications    VTE Prophylaxis SCDs /PCDs placed on 12/30, as appropriate prior to end of hospital day 2.    Antithrombotic: aspirin and clopidogrel started on 12/30, as appropriate by end of hospital day 2. Continue antithrombotic therapy on discharge to meet quality measures, unless contraindicated.    Anticoagulation if history of A-fib/flutter: Patient does not have history of A-fib/flutter - anticoagulation not required for medication related stroke core measures.     LDL Cholesterol Calculated   Date Value Ref Range Status   12/16/2021 70 <=129 mg/dL Final       Patient's home statin, Lipitor (atorvastatin) restarted; continue statin on discharge to meet quality measures, unless contraindicated.     Recommendations: None at this time    Thank you for the consult.    Inessa Head RPH 12/31/2022 9:16 AM

## 2022-12-31 NOTE — ED NOTES
[facility] ED Handoff Report    ED Chief Complaint: stroke symptoms    ED Diagnosis:  (R47.81) Slurred speech  Comment:   Plan: obs    (R42) Dizziness  Comment:   Plan: obs    (R40.0) Somnolence  Comment:   Plan:        PMH:    Past Medical History:   Diagnosis Date    Anxiety 04/24/2020    Asthma exacerbation 09/11/2017    Bipolar I disorder (H) 04/24/2020    Controlled type 2 diabetes mellitus, without long-term current use of insulin (H) 09/11/2017    Mixed hyperlipidemia     Recurrent major depressive disorder (H) 09/11/2017    Respiratory insufficiency 09/11/2017        Code Status:  Full Code     Falls Risk: Yes Band: Applied    Current Living Situation/Residence: lives with a significant other     Elimination Status: Continent: Yes     Activity Level: 2 assist    Patients Preferred Language:  English     Needed: No    Vital Signs:  BP (!) 155/75   Pulse 82   Resp 19   Wt 89 kg (196 lb 4.8 oz)   SpO2 96%   BMI 33.69 kg/m       Cardiac Rhythm:     Pain Score: 1/10    Is the Patient Confused:  Yes    Last Food or Drink: 12/30/22 at 2100    Focused Assessment:      Tests Performed: Done: Labs and Imaging    Treatments Provided:  meds, labs, imaging    Family Dynamics/Concerns: No    Family Updated On Visitor Policy: Yes    Plan of Care Communicated to Family: Yes    Who Was Updated about Plan of Care: wife    Belongings Checklist Done and Signed by Patient: Yes    Medications sent with patient:     Covid: asymptomatic , negative    Additional Information:     RN: Nenita Atkins RN   12/30/2022 10:02 PM

## 2022-12-31 NOTE — ED NOTES
Requested meds from pharmacy; pt hungry; gave pt turkey sandwich, pudding and he seemed happy with that food. Wife went home for the night.

## 2022-12-31 NOTE — PLAN OF CARE
PRIMARY DIAGNOSIS: TIA  OUTPATIENT/OBSERVATION GOALS TO BE MET BEFORE DISCHARGE:  ADLs back to baseline: No    Activity and level of assistance: Up with standby assistance.    Pain status: Improved-controlled with oral pain medications.    Return to near baseline physical activity: No     Discharge Planner Nurse   Safe discharge environment identified: No  Barriers to discharge: Yes       Entered by: Marty Dias RN 12/31/2022 1:06 AM     Please review provider order for any additional goals.   Nurse to notify provider when observation goals have been met and patient is ready for discharge.

## 2022-12-31 NOTE — H&P
[facility] History and Physical    Montez Cabrera  [address]   [facility] [address]  : 1950  Admission Date/Time: 2022  4:34 PM    Primary Care Provider / [facility] Attending Physician:  Arnulfo Mcallister DO     Assessment:     Principal Problem:    TIA (transient ischemic attack)  Active Problems:    Controlled type 2 diabetes mellitus, without long-term current use of insulin (H)    Asthma    Depression    Anxiety    Bipolar I disorder (H)      Plan:    72-year-old male with history of hypertension, CKD 3, DM2, HLP, asthma, anxiety, depression and bipolar disorder who presents with dysarthria and concern for CVA/TIA.      Dysarthria: Concern for TIA given improvement in symptoms.  CT and MRI brain negative for acute stroke  -- Neurology recommends treatment for TIA  -- Start aspirin 3 and 25 mg daily, Plavix load and Plavix 75 mg daily thereafter  -- Hold home lisinopril for permissive hypertension overnight  -- Check lipids, continue home statin  -- Check TTE, monitor on telemetry overnight  -- neurochecks  -- neurology consult for the AM      Somnolence with intermittent hallucinations: Family endorses increased somnolence with intermittent hallucinations since presenting to the emergency room.  Etiology could be related to TIA versus polypharmacy versus other metabolic etiology. Of note, MRI shows generalized brain atrophy and presumed microvascular ischemic changes.  --TSH unremarkable, BMP shows stable CKD, CBC unremarkable  --Check UA/UC, ammonia, vitamin B12, folate, magnesium  -- Hold home Zyprexa, trazodone, and Ambien for now      Elevated troponin: Mild trop elevation of 34 on admission is stable at 34 on recheck.   --EKG nonischemic and patient denies chest pain  -- monitor on telemetry for now  -- continue ASA as above, plavix and home statin      DM2:   -- hold home victoza  -- continue home amaryl, metfromin  and add sliding scale insulin while inpatient, follow for increased insulin needs  -- Continue home gabapentin      CKD3: stable, trend      Mood disorder, bipolar, depression and anxiety  -- holding home  Zyprexa, trazodone, and Ambien as above for increased somnolence  -- continue home effexor, Lamictal, Lexapro and Abilify      Asthma: Continue home Singulair and albuterol.  Patient has no longer taking home ipratropium inhaler      DVT PPX: SCD    Code status:  Full Code confirmed on admission       Arnulfo Mcallister D.O.          _____________________________________________________________  CHIEF COMPLAINT:   dysarthria     HPI:  72-year-old male with history of hypertension, CKD 3, DM2, HLP, asthma, anxiety, depression and bipolar disorder who presents with dysarthria. Symptoms started several hours ago and persisted for at least a few hours before starting to improve in the ED. ROS+ for somnolence and hallucinations.   Remainder of ROS negative. Denies any other exacerbating or improving factors.          ALLERGIES/SENSITIVITIES:   Allergies   Allergen Reactions     Bupropion Diarrhea     Erythromycin Diarrhea     Theophylline GI Disturbance       (Not in a hospital admission)      Past Medical History:   Diagnosis Date     Anxiety 04/24/2020     Asthma exacerbation 09/11/2017     Bipolar I disorder (H) 04/24/2020     Controlled type 2 diabetes mellitus, without long-term current use of insulin (H) 09/11/2017     Mixed hyperlipidemia      Recurrent major depressive disorder (H) 09/11/2017     Respiratory insufficiency 09/11/2017       Past Surgical History:   Procedure Laterality Date     FOOT SURGERY       OTHER SURGICAL HISTORY      ARM SURGERY       REVIEW OF SYSTEMS:   Constitutional: no fever, chills, or sweats  Eyes: No visual disturbance or irritation  ENT: No nose or throat congestion or pain  Respiratory: No wheezes, cough, shortness of breath, or pain with breathing  Cardiovascular: No chest pain  or palpitations  Gastrointestinal: No nausea, vomiting, diarrhea, dyspepsia, or pain  Genitourinary: No urgency, frequency, or dysuria  Integument/breast: No rash, pruritis, or lesion  Hematologic/lymphatic: No bleeding or unusual bruising  Musculoskeletal: No joint swelling, pain, or unusual back pain  Neurological: No headache, arm or leg numbness or weakness, dizziness, or gait disturbance  Psychiatric:  As above  Endocrine: No appetite disturbance, sleep disturbance, or unusual weight loss or gain  Allergic/Immunologic: No hives, allergic swelling or wheeze or rhinitis  All other systems on reveiw are negative.    Social History     Socioeconomic History     Marital status:      Spouse name: Not on file     Number of children: Not on file     Years of education: Not on file     Highest education level: Not on file   Occupational History     Not on file   Tobacco Use     Smoking status: Former     Types: Cigarettes     Quit date: 1977     Years since quittin.3     Smokeless tobacco: Never   Substance and Sexual Activity     Alcohol use: No     Comment: Alcoholic Drinks/day: quit years ago     Drug use: No     Sexual activity: Not on file     Comment: not asked   Other Topics Concern     Not on file   Social History Narrative    retired     Social Determinants of Health     Financial Resource Strain: Not on file   Food Insecurity: Not on file   Transportation Needs: Not on file   Physical Activity: Not on file   Stress: Not on file   Social Connections: Not on file   Intimate Partner Violence: Not on file   Housing Stability: Not on file        No family history on file.    PHYSICAL EXAM:  General Appearance: In no acute distress  BP (!) 155/75   Pulse 82   Resp 19   Wt 89 kg (196 lb 4.8 oz)   SpO2 96%   BMI 33.69 kg/m    EYES: Clear, without inflammation   HEENT: Without congestion or inflammation  RESPIRATORY: Respirations  CARDIOVASCULAR: No le edema bilat.  ABDOMEN: soft and  non-tender  RECTAL: deferred  GENITOURINARY: deferred  MUSCULOSKELETAL: No joint swelling, inflammation, or tenderness  SKIN/HAIR/NAILS: No rash or significant lesion  NEUROLOGIC: No focal arm or leg  weakness, speech is slightly dysarthric        Labs Reviewed:   Recent Results (from the past 24 hour(s))   Extra Red Top Tube    Collection Time: 12/30/22  4:56 PM   Result Value Ref Range    Hold Specimen JIC    Extra Purple Top Tube    Collection Time: 12/30/22  4:56 PM   Result Value Ref Range    Hold Specimen JIC    Extra Blue Top Tube    Collection Time: 12/30/22  4:56 PM   Result Value Ref Range    Hold Specimen JIC    Basic metabolic panel    Collection Time: 12/30/22  4:56 PM   Result Value Ref Range    Sodium 141 136 - 145 mmol/L    Potassium 5.3 3.4 - 5.3 mmol/L    Chloride 104 98 - 107 mmol/L    Carbon Dioxide (CO2) 30 (H) 22 - 29 mmol/L    Anion Gap 7 7 - 15 mmol/L    Urea Nitrogen 21.4 8.0 - 23.0 mg/dL    Creatinine 1.30 (H) 0.67 - 1.17 mg/dL    Calcium 9.4 8.8 - 10.2 mg/dL    Glucose 135 (H) 70 - 99 mg/dL    GFR Estimate 58 (L) >60 mL/min/1.73m2   Troponin T, High Sensitivity    Collection Time: 12/30/22  4:56 PM   Result Value Ref Range    Troponin T, High Sensitivity 34 (H) <=22 ng/L   CBC with platelets and differential    Collection Time: 12/30/22  4:56 PM   Result Value Ref Range    WBC Count 9.2 4.0 - 11.0 10e3/uL    RBC Count 3.88 (L) 4.40 - 5.90 10e6/uL    Hemoglobin 12.3 (L) 13.3 - 17.7 g/dL    Hematocrit 39.9 (L) 40.0 - 53.0 %     (H) 78 - 100 fL    MCH 31.7 26.5 - 33.0 pg    MCHC 30.8 (L) 31.5 - 36.5 g/dL    RDW 12.0 10.0 - 15.0 %    Platelet Count 297 150 - 450 10e3/uL    % Neutrophils 68 %    % Lymphocytes 19 %    % Monocytes 6 %    % Eosinophils 5 %    % Basophils 1 %    % Immature Granulocytes 1 %    NRBCs per 100 WBC 0 <1 /100    Absolute Neutrophils 6.4 1.6 - 8.3 10e3/uL    Absolute Lymphocytes 1.7 0.8 - 5.3 10e3/uL    Absolute Monocytes 0.5 0.0 - 1.3 10e3/uL    Absolute  Eosinophils 0.5 0.0 - 0.7 10e3/uL    Absolute Basophils 0.1 0.0 - 0.2 10e3/uL    Absolute Immature Granulocytes 0.1 <=0.4 10e3/uL    Absolute NRBCs 0.0 10e3/uL   TSH with free T4 reflex    Collection Time: 12/30/22  4:56 PM   Result Value Ref Range    TSH 3.16 0.30 - 4.20 uIU/mL   Magnesium    Collection Time: 12/30/22  4:56 PM   Result Value Ref Range    Magnesium 2.2 1.7 - 2.3 mg/dL   Hemoglobin A1c    Collection Time: 12/30/22  4:56 PM   Result Value Ref Range    Hemoglobin A1C 7.2 (H) <5.7 %   INR    Collection Time: 12/30/22  7:35 PM   Result Value Ref Range    INR 0.93 0.85 - 1.15   Partial thromboplastin time    Collection Time: 12/30/22  7:35 PM   Result Value Ref Range    aPTT 25 22 - 38 Seconds   Extra Green Top (Lithium Heparin) Tube    Collection Time: 12/30/22  7:35 PM   Result Value Ref Range    Hold Specimen JIC    Troponin T, High Sensitivity    Collection Time: 12/30/22  7:35 PM   Result Value Ref Range    Troponin T, High Sensitivity 34 (H) <=22 ng/L   Asymptomatic COVID-19 Virus (Coronavirus) by PCR Nasopharyngeal    Collection Time: 12/30/22  7:42 PM    Specimen: Nasopharyngeal; Swab   Result Value Ref Range    SARS CoV2 PCR Negative Negative   Ammonia    Collection Time: 12/30/22  9:07 PM   Result Value Ref Range    Ammonia 12 (L) 16 - 60 umol/L

## 2022-12-31 NOTE — PLAN OF CARE
PRIMARY DIAGNOSIS: TIA  OUTPATIENT/OBSERVATION GOALS TO BE MET BEFORE DISCHARGE:  ADLs back to baseline: No    Activity and level of assistance: Up with standby assistance.    Pain status: Improved-controlled with oral pain medications.    Return to near baseline physical activity: No     Discharge Planner Nurse   Safe discharge environment identified: Yes  Barriers to discharge: Yes       Entered by: Marty Dias RN 12/31/2022 5:34 AM     Please review provider order for any additional goals.   Nurse to notify provider when observation goals have been met and patient is ready for discharge.    Alert and oriented x 4. Admitted with TIA. NIH scored 1.  On aspirations and fall precautions. Head of elevated at 30 degrees and bed alarm activated. Tele monitoring with NSR. Blood pressure has been elevated at 171/84 and 162/74, patient has permissive hypertension. No complain of headache, no nausea/vomiting. Continent of bowel and bladder. Assist of 1. IV saline lock. Blood glucose was 126 at 0500. Denies pain at this time.

## 2022-12-31 NOTE — CONSULTS
NEUROLOGY INPATIENT CONSULTATION NOTE       [facility] NEUROLOGY  [email or web address]     Montez Cabrera,  1950, MRN [id number]  PCP: Lori Cerrato  Date: 2022     ASSESSMENT & PLAN     Diagnosis code: Spell, possible TIA    The patient is reporting symptoms sudden in onset where he just felt overwhelmingly tired and weak as well as had some slurred speech.  There is a lack of focality here that makes it less likely to be a TIA.  I certainly cannot rule out TIA especially considering the abrupt onset of symptoms.  The MRI has been reassuring at ruling out stroke.  He was not a candidate for thrombolytics due to the fact symptoms were resolving.    At this point time I would recommend using the dual antiplatelet therapy at least for the next 21 days of aspirin 81 mg and Plavix 75 mg daily.  Once this is over the patient can go back to aspirin monotherapy or Plavix monotherapy I will defer that decision to his primary care physician.  No anticoagulation as there is no evidence of cardiac arrhythmia at this time.    There is no need for permissive hypertension at home antihypertensive should be started.  The patient has not had a stroke.    The patient's lipids are at goal continue atorvastatin at home dose.    The patient's diabetes is not at goal of less than 7.0.  His primary care physician just recently changed his insulin dosing and I would recommend keeping at that with him following up with her.    I would recommend the patient have a cardiac monitor from anywhere for 14-30 days following discharge.  The acute onset generalized malaise could certainly have represented a cardiac arrhythmia which is the only other thing that has not been completely ruled out at this time.    I do not think any further neurologic work-up is needed in the hospital.  I did discuss with the patient when to return to the emergency department which is essentially with any acute onset new  symptoms.    Elevated troponin noted at hospital admission has been stable.  Defer to primary team regarding management      Thank you again for this referral, please feel free to contact me if you have any questions.    Belinda Peñaloza MD   of Neurology  [facility]  Pager: [phone]     CHIEF COMPLAINT TIA (transient ischemic attack)     HISTORY OF PRESENT ILLNESS     Neurology has been requested by Dr. Mcallister to evaluate Montez Moser who is a 72 year old  male for possible TIA.      Patient is a 72-year-old gentleman who reports that he was sitting at a bar yesterday drinking a Coke.  He is sure that he did not have any alcohol.  He all of a sudden had an acute onset malaise where he just felt overall weak and had to rest his head on the bar.  A friend then showed up at the bar and helped him get to the hospital.  When he tried to speak to the friend he had slurred speech.  He denies any [place] vertigo its more that he just felt overall really tired.  Symptoms started getting better before he arrived in the emergency department and resolved in the emergency department.  Patient is not sure of the exact overall time but it sounds as though it was at least an hour.  Did not receive any thrombolytics due to the resolution of symptoms while in the ED.    The patient reports he is never had symptoms like this before and cannot think of any trigger.  He has not recently been ill or started new medications although it does appear that his primary care physician Dr. Cerrato increased his insulin to 34 units twice a day on December 14 due to uncontrolled diabetes.  She also noted a low vitamin B12 level and some memory changes..  He does have a diagnosis of diabetes type 2 and hyperlipidemia.  He has been hypertensive since admission.  Hemoglobin A1c was 7.2.  LDL cholesterol is 72 and considered at goal.  Troponins were elevated at time of admission.     PROBLEM LIST      Patient Active  Problem List   Diagnosis Code     Asthma exacerbation J45.901     Respiratory insufficiency R06.89     Controlled type 2 diabetes mellitus, without long-term current use of insulin (H) E11.9     Recurrent major depressive disorder (H) F33.9     Asthma J45.909     Acute respiratory failure with hypoxia (H) J96.01     Depression F32.A     Anxiety F41.9     Bipolar I disorder (H) F31.9     Other insomnia G47.09     TIA (transient ischemic attack) G45.9      Clinically Significant Risk Factors Present on Admission                  # Hypertension: home medication list includes antihypertensive(s)     # DMII: A1C = 7.2 % (Ref range: <5.7 %) within past 3 months               PAST MEDICAL & SURGICAL HISTORY     Past Medical History: Patient  has a past medical history of Anxiety (2020), Asthma exacerbation (2017), Bipolar I disorder (H) (2020), Controlled type 2 diabetes mellitus, without long-term current use of insulin (H) (2017), Mixed hyperlipidemia, Recurrent major depressive disorder (H) (2017), and Respiratory insufficiency (2017).    Past Surgical History: He  has a past surgical history that includes Foot surgery and other surgical history.     SOCIAL HISTORY     Reviewed, and he  reports that he quit smoking about 45 years ago. He has never used smokeless tobacco. He reports that he does not drink alcohol and does not use drugs.     FAMILY HISTORY     Reviewed, and family history is not on file.  Both parents are .  He is not aware of stroke diagnoses     ALLERGIES     Allergies   Allergen Reactions     Bupropion Diarrhea     Erythromycin Diarrhea     Theophylline GI Disturbance        REVIEW OF SYSTEMS     A comprehensive review of systems was negative.     HOME & HOSPITAL MEDICATIONS     Prior to Admission Medications  Medications Prior to Admission   Medication Sig Dispense Refill Last Dose     albuterol (PROAIR HFA/PROVENTIL HFA/VENTOLIN HFA) 108 (90 Base) MCG/ACT  inhaler Inhale 2 puffs into the lungs 2 times daily as needed for shortness of breath / dyspnea or wheezing        ARIPiprazole (ABILIFY) 20 MG tablet Take 20 mg by mouth every evening   12/29/2022     atorvastatin (LIPITOR) 10 MG tablet Take 10 mg by mouth every evening   12/29/2022     brimonidine-timolol (COMBIGAN) 0.2-0.5 % ophthalmic solution Place 1 drop into both eyes 2 times daily   12/30/2022 at x1 am     escitalopram (LEXAPRO) 20 MG tablet Take 20 mg by mouth every evening   12/29/2022     gabapentin (NEURONTIN) 400 MG capsule Take 1,200 mg by mouth 2 times daily   12/30/2022 at x1 am     glimepiride (AMARYL) 4 MG tablet Take 4 mg by mouth 2 times daily   12/30/2022 at x1 am     insulin glargine (LANTUS PEN) 100 UNIT/ML pen Inject 30 Units Subcutaneous 2 times daily   12/30/2022 at x1 am     lamoTRIgine (LAMICTAL) 100 MG tablet Take 300 mg by mouth every evening   12/29/2022     liraglutide (VICTOZA) 18 MG/3ML solution Inject 1.2 mg Subcutaneous daily as needed        lisinopril (ZESTRIL) 10 MG tablet Take 20 mg by mouth daily   12/30/2022     metFORMIN (GLUCOPHAGE-XR) 500 MG 24 hr tablet Take 1,000 mg by mouth 2 times daily (with meals)   12/30/2022 at x1 am     montelukast (SINGULAIR) 10 MG tablet Take 10 mg by mouth At Bedtime   12/29/2022     OLANZapine (ZYPREXA) 10 MG tablet Take 10 mg by mouth At Bedtime   12/29/2022     traZODone (DESYREL) 150 MG tablet Take 300 mg by mouth At Bedtime   12/29/2022     venlafaxine (EFFEXOR-XR) 75 MG 24 hr capsule Take 150 mg by mouth in the morning and 75 mg at night   12/30/2022 at x1 am     zolpidem (AMBIEN) 10 MG tablet Take 5 mg by mouth At Bedtime   12/29/2022     ipratropium (ATROVENT HFA) 17 MCG/ACT inhaler Inhale 2 puffs into the lungs 2 times daily (Patient not taking: Reported on 12/30/2022)   Not Taking       Hospital Medications    ARIPiprazole  20 mg Oral QPM     aspirin  325 mg Oral Daily     atorvastatin  10 mg Oral QPM     clopidogrel  75 mg Oral  Daily     escitalopram  20 mg Oral QPM     gabapentin  1,200 mg Oral BID     [Held by provider] glimepiride  4 mg Oral BID     insulin aspart  1-7 Units Subcutaneous TID AC     insulin aspart  1-5 Units Subcutaneous At Bedtime     insulin glargine  30 Units Subcutaneous At Bedtime     lamoTRIgine  300 mg Oral QPM     [Held by provider] metFORMIN  1,000 mg Oral BID w/meals     montelukast  10 mg Oral At Bedtime     senna-docusate  1-2 tablet Oral or NG Tube BID     venlafaxine  150 mg Oral Daily with breakfast     venlafaxine  75 mg Oral At Bedtime        PHYSICAL EXAM     Vital signs  Temp:  [97.9  F (36.6  C)-98.7  F (37.1  C)] 98.7  F (37.1  C)  Pulse:  [77-93] 85  Resp:  [18-33] 20  BP: (137-178)/(72-84) 178/82  SpO2:  [91 %-96 %] 94 %    Weight:   Wt Readings from Last 1 Encounters:   12/31/22 89.5 kg (197 lb 6.4 oz)        General Physical Exam:   The patient is in the hospital bed he is in no acute distress.  Neurological Exam:  Mental status: Somewhat poor detail recall from yesterday.  He is oriented to person place and time today.  Attention is intact.  Recall of 3 items is normal today.  Speech: No aphasia or dysarthria  Cranial nerves: Cranial nerves II through XII intact  Motor: The patient has normal muscle bulk, tone and strength.  Reflexes: He has reduced reflexes in the lower extremities bilaterally.  Plantar responses are flexor  Sensory: Diminished light touch in the lower extremities in the feet  Coordination: No evidence of any tremor.  Finger-to-nose normal  Gait: Deferred     DIAGNOSTIC STUDIES     Pertinent Radiology   Radiology Results: Reviewed impression and images     CTA head and Neck  12/30 no large vessel occlusion    MRI  MRI brain 12/30 normal. No acute stroke    Echo  Interpretation Summary     1. The left ventricle is normal in size. Left ventricular function is  normal.The ejection fraction is 60-65%. No regional wall motion abnormalities  noted.  2. Normal right ventricle size and  systolic function.  3. No hemodynamically significant valvular abnormalities on 2D or color flow  imaging.    Pertinent Labs   Lab Results: Personally Reviewed   Recent Results (from the past 24 hour(s))   Extra Red Top Tube    Collection Time: 12/30/22  4:56 PM   Result Value Ref Range    Hold Specimen JIC    Extra Purple Top Tube    Collection Time: 12/30/22  4:56 PM   Result Value Ref Range    Hold Specimen JIC    Extra Blue Top Tube    Collection Time: 12/30/22  4:56 PM   Result Value Ref Range    Hold Specimen JIC    Basic metabolic panel    Collection Time: 12/30/22  4:56 PM   Result Value Ref Range    Sodium 141 136 - 145 mmol/L    Potassium 5.3 3.4 - 5.3 mmol/L    Chloride 104 98 - 107 mmol/L    Carbon Dioxide (CO2) 30 (H) 22 - 29 mmol/L    Anion Gap 7 7 - 15 mmol/L    Urea Nitrogen 21.4 8.0 - 23.0 mg/dL    Creatinine 1.30 (H) 0.67 - 1.17 mg/dL    Calcium 9.4 8.8 - 10.2 mg/dL    Glucose 135 (H) 70 - 99 mg/dL    GFR Estimate 58 (L) >60 mL/min/1.73m2   Troponin T, High Sensitivity    Collection Time: 12/30/22  4:56 PM   Result Value Ref Range    Troponin T, High Sensitivity 34 (H) <=22 ng/L   CBC with platelets and differential    Collection Time: 12/30/22  4:56 PM   Result Value Ref Range    WBC Count 9.2 4.0 - 11.0 10e3/uL    RBC Count 3.88 (L) 4.40 - 5.90 10e6/uL    Hemoglobin 12.3 (L) 13.3 - 17.7 g/dL    Hematocrit 39.9 (L) 40.0 - 53.0 %     (H) 78 - 100 fL    MCH 31.7 26.5 - 33.0 pg    MCHC 30.8 (L) 31.5 - 36.5 g/dL    RDW 12.0 10.0 - 15.0 %    Platelet Count 297 150 - 450 10e3/uL    % Neutrophils 68 %    % Lymphocytes 19 %    % Monocytes 6 %    % Eosinophils 5 %    % Basophils 1 %    % Immature Granulocytes 1 %    NRBCs per 100 WBC 0 <1 /100    Absolute Neutrophils 6.4 1.6 - 8.3 10e3/uL    Absolute Lymphocytes 1.7 0.8 - 5.3 10e3/uL    Absolute Monocytes 0.5 0.0 - 1.3 10e3/uL    Absolute Eosinophils 0.5 0.0 - 0.7 10e3/uL    Absolute Basophils 0.1 0.0 - 0.2 10e3/uL    Absolute Immature Granulocytes  0.1 <=0.4 10e3/uL    Absolute NRBCs 0.0 10e3/uL   TSH with free T4 reflex    Collection Time: 12/30/22  4:56 PM   Result Value Ref Range    TSH 3.16 0.30 - 4.20 uIU/mL   Magnesium    Collection Time: 12/30/22  4:56 PM   Result Value Ref Range    Magnesium 2.2 1.7 - 2.3 mg/dL   Hemoglobin A1c    Collection Time: 12/30/22  4:56 PM   Result Value Ref Range    Hemoglobin A1C 7.2 (H) <5.7 %   INR    Collection Time: 12/30/22  7:35 PM   Result Value Ref Range    INR 0.93 0.85 - 1.15   Partial thromboplastin time    Collection Time: 12/30/22  7:35 PM   Result Value Ref Range    aPTT 25 22 - 38 Seconds   Vitamin B12    Collection Time: 12/30/22  7:35 PM   Result Value Ref Range    Vitamin B12 264 232 - 1,245 pg/mL   Folate    Collection Time: 12/30/22  7:35 PM   Result Value Ref Range    Folic Acid 12.0 4.6 - 34.8 ng/mL   Extra Green Top (Lithium Heparin) Tube    Collection Time: 12/30/22  7:35 PM   Result Value Ref Range    Hold Specimen JIC    Troponin T, High Sensitivity    Collection Time: 12/30/22  7:35 PM   Result Value Ref Range    Troponin T, High Sensitivity 34 (H) <=22 ng/L   Lipid panel reflex to direct LDL: Non-fasting    Collection Time: 12/30/22  7:35 PM   Result Value Ref Range    Cholesterol 125 <200 mg/dL    Triglycerides 82 <150 mg/dL    Direct Measure HDL 37 (L) >=40 mg/dL    LDL Cholesterol Calculated 72 <=100 mg/dL    Non HDL Cholesterol 88 <130 mg/dL   Asymptomatic COVID-19 Virus (Coronavirus) by PCR Nasopharyngeal    Collection Time: 12/30/22  7:42 PM    Specimen: Nasopharyngeal; Swab   Result Value Ref Range    SARS CoV2 PCR Negative Negative   Ammonia    Collection Time: 12/30/22  9:07 PM   Result Value Ref Range    Ammonia 12 (L) 16 - 60 umol/L   Glucose by meter    Collection Time: 12/31/22 12:11 AM   Result Value Ref Range    GLUCOSE BY METER POCT 142 (H) 70 - 99 mg/dL   Glucose by meter    Collection Time: 12/31/22  5:11 AM   Result Value Ref Range    GLUCOSE BY METER POCT 126 (H) 70 - 99 mg/dL    UA reflex to Microscopic and Culture    Collection Time: 12/31/22  5:15 AM    Specimen: Urine, Midstream   Result Value Ref Range    Color Urine Light Yellow Colorless, Straw, Light Yellow, Yellow    Appearance Urine Clear Clear    Glucose Urine Negative Negative mg/dL    Bilirubin Urine Negative Negative    Ketones Urine Negative Negative mg/dL    Specific Gravity Urine 1.050 (H) 1.001 - 1.030    Blood Urine Negative Negative    pH Urine 6.0 5.0 - 7.0    Protein Albumin Urine 20 (A) Negative mg/dL    Urobilinogen Urine <2.0 <2.0 mg/dL    Nitrite Urine Negative Negative    Leukocyte Esterase Urine Negative Negative    RBC Urine 0 <=2 /HPF    WBC Urine <1 <=5 /HPF   CBC with platelets    Collection Time: 12/31/22  7:54 AM   Result Value Ref Range    WBC Count 8.1 4.0 - 11.0 10e3/uL    RBC Count 3.31 (L) 4.40 - 5.90 10e6/uL    Hemoglobin 10.5 (L) 13.3 - 17.7 g/dL    Hematocrit 33.8 (L) 40.0 - 53.0 %     (H) 78 - 100 fL    MCH 31.7 26.5 - 33.0 pg    MCHC 31.1 (L) 31.5 - 36.5 g/dL    RDW 11.9 10.0 - 15.0 %    Platelet Count 265 150 - 450 10e3/uL   Basic metabolic panel    Collection Time: 12/31/22  7:54 AM   Result Value Ref Range    Sodium 137 136 - 145 mmol/L    Potassium 4.8 3.4 - 5.3 mmol/L    Chloride 102 98 - 107 mmol/L    Carbon Dioxide (CO2) 27 22 - 29 mmol/L    Anion Gap 8 7 - 15 mmol/L    Urea Nitrogen 20.1 8.0 - 23.0 mg/dL    Creatinine 1.22 (H) 0.67 - 1.17 mg/dL    Calcium 8.8 8.8 - 10.2 mg/dL    Glucose 56 (L) 70 - 99 mg/dL    GFR Estimate 63 >60 mL/min/1.73m2   Glucose by meter    Collection Time: 12/31/22  8:08 AM   Result Value Ref Range    GLUCOSE BY METER POCT 49 (LL) 70 - 99 mg/dL   Glucose by meter    Collection Time: 12/31/22  8:30 AM   Result Value Ref Range    GLUCOSE BY METER POCT 70 70 - 99 mg/dL   Glucose by meter    Collection Time: 12/31/22  9:46 AM   Result Value Ref Range    GLUCOSE BY METER POCT 109 (H) 70 - 99 mg/dL   Echocardiogram Complete - For age > 60 yrs    Collection Time:  12/31/22 10:35 AM   Result Value Ref Range    LVEF  60-65%    Glucose by meter    Collection Time: 12/31/22 12:31 PM   Result Value Ref Range    GLUCOSE BY METER POCT 59 (L) 70 - 99 mg/dL   Glucose by meter    Collection Time: 12/31/22 12:58 PM   Result Value Ref Range    GLUCOSE BY METER POCT 91 70 - 99 mg/dL       Total time spent for face to face visit, reviewing labs/imaging studies, counseling and coordination of care was: 60 minutes More than 50% of this time was spent on counseling and coordination of care.      This note was dictated using voice recognition software.  Any grammatical or context distortions are unintentional and inherent to the software.

## 2022-12-31 NOTE — PROGRESS NOTES
Patient was given a tent call light but refused to use and stated nothing will work. IPad is set up in room with sound capability so staff can hear when patient calls for help. Staff also doing frequent intentional rounding on patient to anticipate needs.

## 2022-12-31 NOTE — PROGRESS NOTES
"   12/31/22 [id number]   Appointment Info   Signing Clinician's Name / Credentials (PT) Faviola Sue PT DPT       Present no   Living Environment   People in Home spouse  (mother in law)   Current Living Arrangements independent living facility   Home Accessibility no concerns   Transportation Anticipated family or friend will provide   Self-Care   Usual Activity Tolerance good   Current Activity Tolerance good   Equipment Currently Used at Home none   Fall history within last six months yes   Number of times patient has fallen within last six months 3   Activity/Exercise/Self-Care Comment Pt states he tripped and fell in his home three times. He does not utilize an assistive device at baseline.   General Information   Onset of Illness/Injury or Date of Surgery 12/30/22   Referring Physician Arnulfo Mcallister DO   Patient/Family Therapy Goals Statement (PT) Discharge home   Pertinent History of Current Problem (include personal factors and/or comorbidities that impact the POC) Per pt's chart: \"72 year old male admitted for possible stroke on 12/30/2022.\"   Existing Precautions/Restrictions no known precautions/restrictions   Cognition   Affect/Mental Status (Cognition) WFL   Orientation Status (Cognition) oriented x 4   Follows Commands (Cognition) WFL   Pain Assessment   Patient Currently in Pain No   Posture    Posture Protracted shoulders;Forward head position   Range of Motion (ROM)   Range of Motion ROM is WFL   Strength (Manual Muscle Testing)   Strength (Manual Muscle Testing) strength is WFL   Bed Mobility   Comment, (Bed Mobility) Bed mobility IND supine<>sitting EOB   Transfers   Comment, (Transfers) Sit<>stand IND without use of assistive device   Gait/Stairs (Locomotion)   [place] Level (Gait) supervision   Distance in Feet 200 feet   Distance in Feet (Gait) 200 feet   Pattern (Gait) swing-through   Deviations/Abnormal Patterns (Gait) gait speed decreased;stride length decreased "   Maintains Weight-bearing Status (Gait) able to maintain   Comment, (Gait/Stairs) Pt steady without using assistive device. Able to perform lateral and vertical head turns while ambulating and no signs of LOB.   Balance   Balance Comments Able to maintain balance IND without assistive device in standing.   Clinical Impression   Criteria for Skilled Therapeutic Intervention Yes, treatment indicated   PT Diagnosis (PT) Impaired functional mobility   Influenced by the following impairments Medical status, balance   Functional limitations due to impairments ambulation   Clinical Presentation (PT Evaluation Complexity) Stable/Uncomplicated   Clinical Presentation Rationale Pt presents as medically diagnosed   Clinical Decision Making (Complexity) low complexity   Planned Therapy Interventions (PT) balance training;gait training;transfer training   Risk & Benefits of therapy have been explained evaluation/treatment results reviewed;care plan/treatment goals reviewed;risks/benefits reviewed;patient   Clinical Impression Comments Patient appears to be at his baseline for functional mobility.   PT Total Evaluation Time   PT Eval, Low Complexity Minutes (47059) 12   Therapy Certification   Start of care date 12/31/22   Certification date from 12/31/22   Certification date to 12/31/22   Medical Diagnosis Transient ischemic attack   Physical Therapy Goals   PT Frequency One time eval and treatment only   PT Predicted Duration/Target Date for Goal Attainment 12/31/22   PT Goals Bed Mobility;Transfers;Gait   PT: Bed Mobility Independent;Supine to/from sit;Completed   PT: Transfers Independent;Sit to/from stand;Completed   PT: Gait Independent;100 feet;Completed   Interventions   Interventions Quick Adds Gait Training   Gait Training   Gait Training Minutes (05165) 8   Symptoms Noted During/After Treatment (Gait Training) none   Treatment Detail/Skilled Intervention PT: Pt facilitated in ambulatin without using assistive device x  200 feet. Able to perform lateral and vertical head turns x 5 each way without gait path deviation or signs of LOB.   [place] Level (Gait Training) independent   Physical Assistance Level (Gait Training) supervision   Gait Analysis Deviations decreased mateo;decreased stride length   PT Discharge Planning   PT Plan Discharge from PT   PT Discharge Recommendation (DC Rec) home with assist   PT Rationale for DC Rec Pt is at his baseline for functional mobility. He may benefit from utilizing an assistive device in the future to decrease risk for future falls, however he presents stable today. Appropriate to discharge from PT.   Total Session Time   Timed Code Treatment Minutes 8   Total Session Time (sum of timed and untimed services) 20     [facility]  OUTPATIENT PHYSICAL THERAPY EVALUATION  PLAN OF TREATMENT FOR OUTPATIENT REHABILITATION  (COMPLETE FOR INITIAL CLAIMS ONLY)  Patient's Last Name, First Name, M.I.  YOB: 1950  Montez Cabrera                        Provider's Name  [facility] Medical Record No.  [id number]                             Onset Date:  12/30/22   Start of Care Date:  (P) 12/31/22   Type:     _X_PT   ___OT   ___SLP Medical Diagnosis:  (P) Transient ischemic attack              PT Diagnosis:  Impaired functional mobility Visits from SOC:  1     See note for plan of treatment, functional goals and certification details    I CERTIFY THE NEED FOR THESE SERVICES FURNISHED UNDER        THIS PLAN OF TREATMENT AND WHILE UNDER MY CARE     (Physician co-signature of this document indicates review and certification of the therapy plan).

## 2022-12-31 NOTE — PROGRESS NOTES
12/31/22 1346   Appointment Info   Signing Clinician's Name / Credentials (OT) Inessa Garcia/L       Present no   Living Environment   People in Home spouse;other (see comments)  (sister in law)   Current Living Arrangements independent living facility   Home Accessibility no concerns   Transportation Anticipated family or friend will provide   Self-Care   Current Activity Tolerance good   Equipment Currently Used at Home grab bar, tub/shower   Fall history within last six months yes   Activity/Exercise/Self-Care Comment Pt is independent with basic ADLs   General Information   Onset of Illness/Injury or Date of Surgery 12/30/22   Referring Physician Ary   Cognitive Status Examination   Orientation Status person  (John nieves, December 2022)   Cognitive Status Comments Pt follows basic directions and is able to give history.  Appears to have some deficits that are likely baseline   Visual Perception   Visual Impairment/Limitations WFL   Sensory   Sensory Quick Adds sensation intact   Range of Motion Comprehensive   General Range of Motion no range of motion deficits identified   Strength Comprehensive (MMT)   General Manual Muscle Testing (MMT) Assessment no strength deficits identified   Coordination   Upper Extremity Coordination No deficits were identified   Bed Mobility   Comment (Bed Mobility) SBA   Transfers   Transfer Comments SBA/CGA   Balance   Balance Comments SBA   Activities of Daily Living   BADL Assessment/Intervention   (SBA dressing, G/H, and toileting)   Clinical Impression   Criteria for Skilled Therapeutic Interventions Met (OT) Evaluation only   OT Diagnosis Impaired ADL independence   OT Problem List-Impairments impacting ADL cognition;balance   Assessment of Occupational Performance 1-3 Performance Deficits   Clinical Decision Making Complexity (OT) low complexity   Risk & Benefits of therapy have been explained evaluation/treatment results  reviewed;patient;participants included   Clinical Impression Comments Pt seen bedside for OT eval.  Pt is SBA with basic ADLs and mobility.  Pt feels he is at baseline.  Pt does appear to have some cognitive deficits but is likely at baseline.  Recommend home with family support.  No further OT indicated at this time.   OT Total Evaluation Time   OT Eval, Low Complexity Minutes (05982) 15   Therapy Certification   Start of Care Date 12/31/22   Certification date from 12/31/22   Certification date to 01/07/23   Medical Diagnosis TIA   OT Discharge Planning   OT Plan D/C OT   OT Discharge Recommendation (DC Rec) home with assist   OT Rationale for DC Rec Recommend home iwth assist as pt most likely at baseline.   OT Brief overview of current status Pt is SBA with basic ADLs and mobiltiy.                                                                                   [facility]      OUTPATIENT OCCUPATIONAL THERAPY  EVALUATION  PLAN OF TREATMENT FOR OUTPATIENT REHABILITATION  (COMPLETE FOR INITIAL CLAIMS ONLY)  Patient's Last Name, First Name, M.I.  YOB: 1950  Montez Cabrera                          Provider's Name  [facility] Medical Record No.  [id number]                               Onset Date:  12/30/22   Start of Care Date:  (P) 12/31/22     Type:     ___PT   _X_OT   ___SLP Medical Diagnosis:  (P) TIA                        OT Diagnosis:  (P) Impaired ADL independence   Visits from SOC:  1   _________________________________________________________________________________  Plan of Treatment/Functional Goals    Planned Interventions:     Goals: See Occupational Therapy Goals on Care Plan in [facility] electronic health record.    Therapy Frequency:    Predicted Duration of Therapy Intervention:    _________________________________________________________________________________    I CERTIFY THE NEED FOR THESE SERVICES FURNISHED UNDER         THIS PLAN OF TREATMENT AND WHILE UNDER MY CARE     (Physician co-signature of this document indicates review and certification of the therapy plan).              Certification date from: (P) 12/31/22, Certification date to: (P) 01/07/23    Referring Physician: Mcallister (P)            Initial Assessment        See Occupational Therapy evaluation dated (P) 12/31/22 in Epic electronic health record.

## 2022-12-31 NOTE — ED NOTES
Pt has audible wheezing but refuses breathing treatments; per wife he doesn't like the way they make him feel.

## 2022-12-31 NOTE — ED NOTES
"Pt walking in hallway, SBA for stroke evaluation. Pt states \"steady on feet, dizziness is better\"   "

## 2022-12-31 NOTE — PROGRESS NOTES
MRI reviewed normal.  Dysarthria resolved.    We will still consider this episode as TIA episode that will need admission for further workup.    ABCD2 score 5. Major TIA    Plan:  Admit patient for TIA workup  Please start aspirin 325 mg now  Please start plavix 300 mg first loading dose and continue 75 mg daily  Please obtain TTE   LDL, A1c  PT OT Speech evaluation

## 2022-12-31 NOTE — PROGRESS NOTES
NIH 0. Pt states resolution of all symptoms.     Blood sugars 49 in the AM, 59 at lunch. MD aware and adjusting diabetic medications.     VSS, will continue to monitor.

## 2022-12-31 NOTE — CONSULTS
Care Management Follow Up    Length of Stay (days): 0    Expected Discharge Date: 01/02/2023     Concerns to be Addressed: discharge planning     Patient plan of care discussed at interdisciplinary rounds: Yes    Anticipated Discharge Disposition:  Home with assist     Anticipated Discharge Services:  Pt has been evaluated by PT/OT. Recommend home with assist.  Anticipated Discharge DME:      Patient/family educated on Medicare website which has current facility and service quality ratings:  yes  Education Provided on the Discharge Plan: yes   Patient/Family in Agreement with the Plan:  yes    Referrals Placed by CM/SW:  None at this time  Private pay costs discussed: Not applicable    Additional Information:  SW following for PT and OT recommendations. Therapy recommends pt return home with assist. Pt lives in independent living with wife.   SW will follow for speech eval for recommendations.    ROSS Workman

## 2022-12-31 NOTE — CONSULTS
Care Management Initial Consult    General Information  Assessment completed with: Patient, Spouse or significant other,    Type of CM/SW Visit: Initial Assessment    Primary Care Provider verified and updated as needed: Yes   Readmission within the last 30 days: no previous admission in last 30 days      Reason for Consult: discharge planning  Advance Care Planning:            Communication Assessment  Patient's communication style: spoken language (English or Bilingual)             Cognitive  Cognitive/Neuro/Behavioral:    Level of Consciousness: (P) other (see comments) (pt is drowsy but awake when prompted and remains oriented)  Arousal Level: (P) opens eyes spontaneously  Orientation: (P) oriented x 4     Best Language: (P) 0 - No aphasia  Speech: (P) slurred    Living Environment:   People in home: spouse, parent(s) (mother-in-law)     Current living Arrangements: independent living facility      Able to return to prior arrangements: yes       Family/Social Support:  Care provided by: self  Provides care for: no one  Marital Status:   Wife  Salud       Description of Support System: Supportive, Involved    Support Assessment: Adequate family and caregiver support, Adequate social supports, Patient communicates needs well met    Current Resources:   Patient receiving home care services: No     Community Resources: None  Equipment currently used at home: glucometer  Supplies currently used at home: Diabetic Supplies    Employment/Financial:  Employment Status: retired        Financial Concerns:             Lifestyle & Psychosocial Needs:  Social Determinants of Health     Tobacco Use: Not on file   Alcohol Use: Not on file   Financial Resource Strain: Not on file   Food Insecurity: Not on file   Transportation Needs: Not on file   Physical Activity: Not on file   Stress: Not on file   Social Connections: Not on file   Intimate Partner Violence: Not on file   Depression: Not on file   Housing Stability:  Not on file       Functional Status:  Prior to admission patient needed assistance:   Dependent ADLs:: Independent, Ambulation-no assistive device  Dependent IADLs:: Independent       Mental Health Status:          Chemical Dependency Status:                Values/Beliefs:  Spiritual, Cultural Beliefs, Islam Practices, Values that affect care:                 Additional Information:  Lives w/spouse and mother-in-law in a sr living apt ([address]). No services, indep at baseline including ambulation. No DME except glucometer. CM to follow hospital progression and assist as needed. Family to transport at discharge.    WADE discussed.    Tammie Larkin RN

## 2023-01-01 LAB
ANION GAP SERPL CALCULATED.3IONS-SCNC: 8 MMOL/L (ref 7–15)
BUN SERPL-MCNC: 17.8 MG/DL (ref 8–23)
CALCIUM SERPL-MCNC: 9.4 MG/DL (ref 8.8–10.2)
CHLORIDE SERPL-SCNC: 101 MMOL/L (ref 98–107)
CREAT SERPL-MCNC: 1.12 MG/DL (ref 0.67–1.17)
DEPRECATED HCO3 PLAS-SCNC: 30 MMOL/L (ref 22–29)
ERYTHROCYTE [DISTWIDTH] IN BLOOD BY AUTOMATED COUNT: 11.7 % (ref 10–15)
GFR SERPL CREATININE-BSD FRML MDRD: 70 ML/MIN/1.73M2
GLUCOSE BLDC GLUCOMTR-MCNC: 170 MG/DL (ref 70–99)
GLUCOSE BLDC GLUCOMTR-MCNC: 170 MG/DL (ref 70–99)
GLUCOSE BLDC GLUCOMTR-MCNC: 189 MG/DL (ref 70–99)
GLUCOSE BLDC GLUCOMTR-MCNC: 271 MG/DL (ref 70–99)
GLUCOSE SERPL-MCNC: 191 MG/DL (ref 70–99)
HCT VFR BLD AUTO: 36.3 % (ref 40–53)
HGB BLD-MCNC: 11.4 G/DL (ref 13.3–17.7)
MCH RBC QN AUTO: 31.8 PG (ref 26.5–33)
MCHC RBC AUTO-ENTMCNC: 31.4 G/DL (ref 31.5–36.5)
MCV RBC AUTO: 101 FL (ref 78–100)
PLATELET # BLD AUTO: 304 10E3/UL (ref 150–450)
POTASSIUM SERPL-SCNC: 5.2 MMOL/L (ref 3.4–5.3)
RBC # BLD AUTO: 3.58 10E6/UL (ref 4.4–5.9)
SODIUM SERPL-SCNC: 139 MMOL/L (ref 136–145)
WBC # BLD AUTO: 9.7 10E3/UL (ref 4–11)

## 2023-01-01 PROCEDURE — 80048 BASIC METABOLIC PNL TOTAL CA: CPT | Performed by: INTERNAL MEDICINE

## 2023-01-01 PROCEDURE — 99207 PR CDG-CUT & PASTE-POTENTIAL IMPACT ON LEVEL: CPT | Performed by: INTERNAL MEDICINE

## 2023-01-01 PROCEDURE — 250N000013 HC RX MED GY IP 250 OP 250 PS 637: Performed by: INTERNAL MEDICINE

## 2023-01-01 PROCEDURE — 250N000012 HC RX MED GY IP 250 OP 636 PS 637: Performed by: INTERNAL MEDICINE

## 2023-01-01 PROCEDURE — 82962 GLUCOSE BLOOD TEST: CPT

## 2023-01-01 PROCEDURE — 85027 COMPLETE CBC AUTOMATED: CPT | Performed by: INTERNAL MEDICINE

## 2023-01-01 PROCEDURE — 96372 THER/PROPH/DIAG INJ SC/IM: CPT | Performed by: INTERNAL MEDICINE

## 2023-01-01 PROCEDURE — 99233 SBSQ HOSP IP/OBS HIGH 50: CPT | Performed by: INTERNAL MEDICINE

## 2023-01-01 PROCEDURE — G0378 HOSPITAL OBSERVATION PER HR: HCPCS

## 2023-01-01 PROCEDURE — 36415 COLL VENOUS BLD VENIPUNCTURE: CPT | Performed by: INTERNAL MEDICINE

## 2023-01-01 RX ADMIN — ACETAMINOPHEN 650 MG: 325 TABLET ORAL at 04:07

## 2023-01-01 RX ADMIN — CLOPIDOGREL BISULFATE 75 MG: 75 TABLET ORAL at 08:09

## 2023-01-01 RX ADMIN — ARIPIPRAZOLE 20 MG: 10 TABLET ORAL at 20:08

## 2023-01-01 RX ADMIN — ALBUTEROL SULFATE 2 PUFF: 90 AEROSOL, METERED RESPIRATORY (INHALATION) at 21:46

## 2023-01-01 RX ADMIN — BRIMONIDINE TARTRATE, TIMOLOL MALEATE 1 DROP: 2; 5 SOLUTION/ DROPS OPHTHALMIC at 21:43

## 2023-01-01 RX ADMIN — ATORVASTATIN CALCIUM 10 MG: 10 TABLET, FILM COATED ORAL at 20:08

## 2023-01-01 RX ADMIN — MONTELUKAST 10 MG: 10 TABLET, FILM COATED ORAL at 21:44

## 2023-01-01 RX ADMIN — ALBUTEROL SULFATE 2 PUFF: 90 AEROSOL, METERED RESPIRATORY (INHALATION) at 04:10

## 2023-01-01 RX ADMIN — ESCITALOPRAM OXALATE 20 MG: 20 TABLET ORAL at 20:08

## 2023-01-01 RX ADMIN — GABAPENTIN 1200 MG: 300 CAPSULE ORAL at 20:08

## 2023-01-01 RX ADMIN — INSULIN ASPART 1 UNITS: 100 INJECTION, SOLUTION INTRAVENOUS; SUBCUTANEOUS at 08:12

## 2023-01-01 RX ADMIN — ASPIRIN 325 MG: 325 TABLET, COATED ORAL at 08:09

## 2023-01-01 RX ADMIN — VENLAFAXINE HYDROCHLORIDE 75 MG: 75 CAPSULE, EXTENDED RELEASE ORAL at 21:44

## 2023-01-01 RX ADMIN — VENLAFAXINE HYDROCHLORIDE 150 MG: 150 CAPSULE, EXTENDED RELEASE ORAL at 08:11

## 2023-01-01 RX ADMIN — LISINOPRIL 20 MG: 20 TABLET ORAL at 08:10

## 2023-01-01 RX ADMIN — INSULIN ASPART 1 UNITS: 100 INJECTION, SOLUTION INTRAVENOUS; SUBCUTANEOUS at 17:06

## 2023-01-01 RX ADMIN — BRIMONIDINE TARTRATE, TIMOLOL MALEATE 1 DROP: 2; 5 SOLUTION/ DROPS OPHTHALMIC at 08:13

## 2023-01-01 RX ADMIN — ALBUTEROL SULFATE 2 PUFF: 90 AEROSOL, METERED RESPIRATORY (INHALATION) at 13:20

## 2023-01-01 RX ADMIN — LAMOTRIGINE 300 MG: 150 TABLET ORAL at 20:08

## 2023-01-01 RX ADMIN — INSULIN ASPART 1 UNITS: 100 INJECTION, SOLUTION INTRAVENOUS; SUBCUTANEOUS at 13:21

## 2023-01-01 RX ADMIN — GABAPENTIN 1200 MG: 300 CAPSULE ORAL at 08:10

## 2023-01-01 ASSESSMENT — ACTIVITIES OF DAILY LIVING (ADL)
ADLS_ACUITY_SCORE: 31

## 2023-01-01 NOTE — PROGRESS NOTES
Pt having intermittent hallucinations, states he was going in and out of sleep and saw objects that aren't there. MD aware. Neuro's otherwise intact.     Blood sugars this shift 84 and 153.     BP's 173/75, 156/70--pt started on Lisinopril this evening.     Will continue to monitor.

## 2023-01-01 NOTE — PLAN OF CARE
"PRIMARY DIAGNOSIS: \"GENERIC\" NURSING  OUTPATIENT/OBSERVATION GOALS TO BE MET BEFORE DISCHARGE:  ADLs back to baseline: Yes    Activity and level of assistance: Up with standby assistance.    Pain status: Improved-controlled with oral pain medications.    Return to near baseline physical activity: Yes     Discharge Planner Nurse   Safe discharge environment identified: Yes  Barriers to discharge: Yes       Entered by: Stella Kwong RN 01/01/2023 4:42 AM   Monitoring BP and BG.  Please review provider order for any additional goals.   Nurse to notify provider when observation goals have been met and patient is ready for discharge.Goal Outcome Evaluation:                        "

## 2023-01-01 NOTE — PROGRESS NOTES
"PRIMARY DIAGNOSIS: \"GENERIC\" NURSING  OUTPATIENT/OBSERVATION GOALS TO BE MET BEFORE DISCHARGE:  ADLs back to baseline: Yes     Activity and level of assistance: SBA    Pain status: Denies     Return to near baseline physical activity: Yes    Discharge Planner Nurse   Safe discharge environment identified: Yes    Barriers to discharge: Yes       Entered by: Lily Ramirez RN 01/01/2023 1:41 PM     Please review provider order for any additional goals.   Nurse to notify provider when observation goals have been met and patient is ready for discharge.  "

## 2023-01-01 NOTE — PROGRESS NOTES
[facility]    Medicine Progress Note - Hospitalist Service    Date of Admission:  12/30/2022    Assessment & Plan   Mr. Trevino is a 72-year-old male with history of hypertension, CKD 3, DM2, HLP, asthma, anxiety, depression and bipolar disorder who presents with dysarthria and concern for CVA/TIA.     Dysarthria possibly related to hypoglycemia: Concern for TIA given improvement in symptoms.  CT and MRI brain negative for acute stroke  -- Neurology recommends treatment for TIA: Start aspirin 3 and 25 mg daily, Plavix load and Plavix 75 mg daily thereafter  -- had changes to basal insulin made but then had poor oral intake for a few days PTA, since here has had significant hypoglycemia. Drinks regular soda daily, coke/pepsi, only take BG in the morning and at night  --  Restarted home lisinopril  -- continue home statin  --  Echo 12/31 within normal limits  -- neurochecks discontinue  -- neurology consulted, appreciate recommendations, have signed off     Somnolence with intermittent hallucinations: Family endorses increased somnolence with intermittent hallucinations since presenting to the emergency room.  Etiology could be related to TIA versus polypharmacy versus other metabolic etiology. Of note, MRI shows generalized brain atrophy and presumed microvascular ischemic changes.  --TSH unremarkable, BMP shows stable CKD, CBC unremarkable  --Checked UA/UC, ammonia, vitamin B12, folate, magnesium- within normal limits, has a history of low B12 from March  -- Hold home Zyprexa, trazodone, and Ambien will likely discontinue prior to discharge     Elevated troponin: Mild trop elevation of 34 on admission is stable at 34 on recheck.   --EKG nonischemic and patient denies chest pain  -- monitor on telemetry for now  -- continue ASA as above, plavix and home statin     DM2 with hypoglycemia  -- hold home victoza  -- Hold oral medications, hold Lantus for now, continue sliding scale  -- Continue  home gabapentin     CKD3: stable, trend     Mood disorder, bipolar, depression and anxiety  -- holding home  Zyprexa, trazodone, and Ambien as above for increased somnolence  -- continue home effexor, Lamictal, Lexapro and Abilify     Asthma: Continue home Singulair and albuterol.  Patient has no longer taking home ipratropium inhaler       Diet: Moderate Consistent Carb (60 g CHO per Meal) Diet    DVT Prophylaxis: Pneumatic Compression Devices  Castillo Catheter: Not present  Central Lines: None  Cardiac Monitoring: None  Code Status: Full Code      Disposition Plan      Expected Discharge Date: 01/02/2023      Destination: home with family  Discharge Comments: PT/OT recs        The patient's care was discussed with the Patient.    Kelly Guerra MD  Hospitalist Service  [facility]  Securely message with the Vocera Web Console (learn more here)  Text page via [facility] Paging/Directory         Clinically Significant Risk Factors Present on Admission                  # Hypertension: home medication list includes antihypertensive(s)     # DMII: A1C = 7.2 % (Ref range: <5.7 %) within past 3 months            ______________________________________________________________________    Interval History   Mr. Moser is having hypoglycemia today.  I discussed with him and his wife plans to get his blood sugar on less of a roller coaster.  We will hold his Lantus and oral hypoglycemics as he has continued to have low sugars all day today.  We will reassess in the morning.  Checking blood sugars every 4 hours at this time.  Continues to have hallucinations.    Data reviewed today: I reviewed all medications, new labs and imaging results over the last 24 hours. I personally reviewed no images or EKG's today.    Physical Exam   Vital Signs: Temp: 98.5  F (36.9  C) Temp src: Oral BP: (!) 173/75 (rn notified) Pulse: 74   Resp: 20 SpO2: 93 % O2 Device: None (Room air)    Weight: 197 lbs 6.4 oz  General  Appearance: Awake, alert, in no acute distress  Respiratory: CTAB, no wheeze  Cardiovascular: RRR, no murmur noted  GI: soft, nontender, non distended, normal bowel sounds  Skin: no jaundice, no rash      Data   Recent Labs   Lab 12/31/22  1641 12/31/22  1258 12/31/22  1231 12/31/22  0808 12/31/22  0754 12/31/22  0011 12/30/22  1935 12/30/22  1656   WBC  --   --   --   --  8.1  --   --  9.2   HGB  --   --   --   --  10.5*  --   --  12.3*   MCV  --   --   --   --  102*  --   --  103*   PLT  --   --   --   --  265  --   --  297   INR  --   --   --   --   --   --  0.93  --    NA  --   --   --   --  137  --   --  141   POTASSIUM  --   --   --   --  4.8  --   --  5.3   CHLORIDE  --   --   --   --  102  --   --  104   CO2  --   --   --   --  27  --   --  30*   BUN  --   --   --   --  20.1  --   --  21.4   CR  --   --   --   --  1.22*  --   --  1.30*   ANIONGAP  --   --   --   --  8  --   --  7   JAYME  --   --   --   --  8.8  --   --  9.4   GLC 84 91 59*   < > 56*   < >  --  135*    < > = values in this interval not displayed.     Medications     - MEDICATION INSTRUCTIONS -       - MEDICATION INSTRUCTIONS -       sodium chloride         ARIPiprazole  20 mg Oral QPM     aspirin  325 mg Oral Daily     atorvastatin  10 mg Oral QPM     brimonidine-timolol  1 drop Both Eyes BID     clopidogrel  75 mg Oral Daily     escitalopram  20 mg Oral QPM     gabapentin  1,200 mg Oral BID     [Held by provider] glimepiride  4 mg Oral BID     insulin aspart  1-7 Units Subcutaneous TID AC     insulin aspart  1-5 Units Subcutaneous At Bedtime     [Held by provider] insulin glargine  30 Units Subcutaneous At Bedtime     lamoTRIgine  300 mg Oral QPM     lisinopril  20 mg Oral Daily     [Held by provider] metFORMIN  1,000 mg Oral BID w/meals     montelukast  10 mg Oral At Bedtime     senna-docusate  1-2 tablet Oral or NG Tube BID     venlafaxine  150 mg Oral Daily with breakfast     venlafaxine  75 mg Oral At Bedtime

## 2023-01-01 NOTE — PLAN OF CARE
"PRIMARY DIAGNOSIS: \"GENERIC\" NURSING  OUTPATIENT/OBSERVATION GOALS TO BE MET BEFORE DISCHARGE:  1. ADLs back to baseline: Yes    2. Activity and level of assistance: Up with standby assistance.    3. Pain status: Pain free.    4. Return to near baseline physical activity: Yes     Discharge Planner Nurse   Safe discharge environment identified: yes  Barriers to discharge: Yes       Entered by: Stella Kwong RN 01/01/2023 2:52 AM   BPs running high, Lisinopril started 12-31-22, BG also running low, meds adjusted.  Please review provider order for any additional goals.   Nurse to notify provider when observation goals have been met and patient is ready for discharge.Goal Outcome Evaluation:                        "

## 2023-01-02 VITALS
TEMPERATURE: 97.9 F | DIASTOLIC BLOOD PRESSURE: 79 MMHG | WEIGHT: 197.4 LBS | RESPIRATION RATE: 18 BRPM | SYSTOLIC BLOOD PRESSURE: 144 MMHG | HEART RATE: 70 BPM | BODY MASS INDEX: 33.88 KG/M2 | OXYGEN SATURATION: 94 %

## 2023-01-02 PROBLEM — F31.9 BIPOLAR I DISORDER (H): Status: ACTIVE | Noted: 2020-04-24

## 2023-01-02 PROBLEM — F41.9 ANXIETY: Status: ACTIVE | Noted: 2020-04-24

## 2023-01-02 PROBLEM — J45.909 ASTHMA: Status: ACTIVE | Noted: 2020-04-23

## 2023-01-02 PROBLEM — E11.9 CONTROLLED TYPE 2 DIABETES MELLITUS, WITHOUT LONG-TERM CURRENT USE OF INSULIN (H): Status: ACTIVE | Noted: 2017-09-11

## 2023-01-02 PROBLEM — F32.A DEPRESSION: Status: ACTIVE | Noted: 2020-04-24

## 2023-01-02 PROBLEM — G45.9 TIA (TRANSIENT ISCHEMIC ATTACK): Status: ACTIVE | Noted: 2022-12-30

## 2023-01-02 PROBLEM — G47.09 OTHER INSOMNIA: Status: ACTIVE | Noted: 2020-04-24

## 2023-01-02 PROBLEM — J45.901 ASTHMA EXACERBATION: Status: RESOLVED | Noted: 2017-09-11 | Resolved: 2023-01-02

## 2023-01-02 PROBLEM — J96.01 ACUTE RESPIRATORY FAILURE WITH HYPOXIA (H): Status: RESOLVED | Noted: 2020-04-24 | Resolved: 2023-01-02

## 2023-01-02 LAB
ANION GAP SERPL CALCULATED.3IONS-SCNC: 8 MMOL/L (ref 7–15)
BUN SERPL-MCNC: 18 MG/DL (ref 8–23)
CALCIUM SERPL-MCNC: 9.5 MG/DL (ref 8.8–10.2)
CHLORIDE SERPL-SCNC: 96 MMOL/L (ref 98–107)
CREAT SERPL-MCNC: 1.07 MG/DL (ref 0.67–1.17)
DEPRECATED HCO3 PLAS-SCNC: 27 MMOL/L (ref 22–29)
GFR SERPL CREATININE-BSD FRML MDRD: 74 ML/MIN/1.73M2
GLUCOSE BLDC GLUCOMTR-MCNC: 177 MG/DL (ref 70–99)
GLUCOSE BLDC GLUCOMTR-MCNC: 179 MG/DL (ref 70–99)
GLUCOSE BLDC GLUCOMTR-MCNC: 231 MG/DL (ref 70–99)
GLUCOSE SERPL-MCNC: 166 MG/DL (ref 70–99)
HOLD SPECIMEN: NORMAL
POTASSIUM SERPL-SCNC: 4.6 MMOL/L (ref 3.4–5.3)
SODIUM SERPL-SCNC: 131 MMOL/L (ref 136–145)

## 2023-01-02 PROCEDURE — 99239 HOSP IP/OBS DSCHRG MGMT >30: CPT | Performed by: INTERNAL MEDICINE

## 2023-01-02 PROCEDURE — 82962 GLUCOSE BLOOD TEST: CPT

## 2023-01-02 PROCEDURE — 96372 THER/PROPH/DIAG INJ SC/IM: CPT

## 2023-01-02 PROCEDURE — 80048 BASIC METABOLIC PNL TOTAL CA: CPT | Performed by: INTERNAL MEDICINE

## 2023-01-02 PROCEDURE — 36415 COLL VENOUS BLD VENIPUNCTURE: CPT | Performed by: INTERNAL MEDICINE

## 2023-01-02 PROCEDURE — 250N000013 HC RX MED GY IP 250 OP 250 PS 637: Performed by: INTERNAL MEDICINE

## 2023-01-02 PROCEDURE — G0378 HOSPITAL OBSERVATION PER HR: HCPCS

## 2023-01-02 RX ORDER — ASPIRIN 325 MG
325 TABLET, DELAYED RELEASE (ENTERIC COATED) ORAL DAILY
Qty: 30 TABLET | Refills: 0 | Status: ON HOLD | OUTPATIENT
Start: 2023-01-03 | End: 2023-01-05

## 2023-01-02 RX ORDER — CLOPIDOGREL BISULFATE 75 MG/1
75 TABLET ORAL DAILY
Qty: 30 TABLET | Refills: 0 | Status: ON HOLD | OUTPATIENT
Start: 2023-01-03 | End: 2024-06-30

## 2023-01-02 RX ADMIN — VENLAFAXINE HYDROCHLORIDE 150 MG: 150 CAPSULE, EXTENDED RELEASE ORAL at 08:51

## 2023-01-02 RX ADMIN — INSULIN ASPART 3 UNITS: 100 INJECTION, SOLUTION INTRAVENOUS; SUBCUTANEOUS at 12:23

## 2023-01-02 RX ADMIN — BRIMONIDINE TARTRATE, TIMOLOL MALEATE 1 DROP: 2; 5 SOLUTION/ DROPS OPHTHALMIC at 08:51

## 2023-01-02 RX ADMIN — GABAPENTIN 1200 MG: 300 CAPSULE ORAL at 08:49

## 2023-01-02 RX ADMIN — ASPIRIN 325 MG: 325 TABLET, COATED ORAL at 08:50

## 2023-01-02 RX ADMIN — INSULIN ASPART 1 UNITS: 100 INJECTION, SOLUTION INTRAVENOUS; SUBCUTANEOUS at 08:49

## 2023-01-02 RX ADMIN — CLOPIDOGREL BISULFATE 75 MG: 75 TABLET ORAL at 08:50

## 2023-01-02 RX ADMIN — LISINOPRIL 20 MG: 20 TABLET ORAL at 08:49

## 2023-01-02 ASSESSMENT — ACTIVITIES OF DAILY LIVING (ADL)
ADLS_ACUITY_SCORE: 31

## 2023-01-02 NOTE — PROGRESS NOTES
[facility]    Medicine Progress Note - Hospitalist Service    Date of Admission:  12/30/2022    Assessment & Plan   Mr. Trevino is a 72-year-old male with history of hypertension, CKD 3, DM2, HLP, asthma, anxiety, depression and bipolar disorder who presents with dysarthria and concern for CVA/TIA.     Dysarthria possibly related to hypoglycemia- resolved  -- Concern for TIA given improvement in symptoms.  CT and MRI brain negative for acute stroke  -- Neurology recommends treatment for TIA: Start aspirin 3 and 25 mg daily, Plavix load and Plavix 75 mg daily thereafter  -- had changes to basal insulin made but then had poor oral intake for a few days PTA, since here has had significant hypoglycemia. Drinks regular soda daily, coke/pepsi, only take BG in the morning and at night  --  Restarted home lisinopril  -- continue home statin  --  Echo 12/31 within normal limits  -- neurochecks discontinue  -- neurology consulted, appreciate recommendations, have signed off     Somnolence with intermittent hallucinations- resolved  -- Family endorses increased somnolence with intermittent hallucinations since presenting to the emergency room.  Etiology could be related to TIA versus polypharmacy versus other metabolic etiology. Of note, MRI shows generalized brain atrophy and presumed microvascular ischemic changes.  --TSH unremarkable, BMP shows stable CKD, CBC unremarkable  --Checked UA/UC, ammonia wnl, vitamin B12 wnl, folate wnl, magnesium- within normal limits, has a history of low B12 from March  -- Hold home Zyprexa, trazodone, and Ambien will likely discontinue prior to discharge  -may need discussion with OP psych if hallucinations present after discharge     Elevated troponin: Mild trop elevation of 34 on admission is stable at 34 on recheck.   --EKG nonischemic and patient denies chest pain  -- monitor on telemetry for now  -- continue ASA as above, plavix and home statin     DM2 with  hypoglycemia  -- hold home victoza  -- Hold oral medications, hold Lantus for now, continue sliding scale  -- Continue home gabapentin  -- discussed dietary changes 1/1     CKD3: stable, trend     Mood disorder, bipolar, depression and anxiety  -- holding home  Zyprexa, trazodone, and Ambien as above for increased somnolence  -- continue home effexor, Lamictal, Lexapro and Abilify     Asthma: Continue home Singulair and albuterol.  Patient has no longer taking home ipratropium inhaler     Diet: Moderate Consistent Carb (60 g CHO per Meal) Diet    DVT Prophylaxis: Pneumatic Compression Devices  Castillo Catheter: Not present  Lines: None     Cardiac Monitoring: None  Code Status: Full Code      Clinically Significant Risk Factors Present on Admission                  # Hypertension: home medication list includes antihypertensive(s)     # DMII: A1C = 7.2 % (Ref range: <5.7 %) within past 3 months            Disposition Plan      Expected Discharge Date: 01/02/2023      Destination: home with family  Discharge Comments: PT/OT recs.          Kelly Guerra MD  Hospitalist Service  [facility]  Securely message with [employer] (more info)  Text page via [facility] Paging/Directory   ______________________________________________________________________    Interval History   Mr. Moser is doing well today. His blood sugars are no longer low. We had a long discussion about dietary changes he needs to make. Specifically stopping soda and orange juice and changing white foods to brown. Diabetes educator has been consulted to go over this information and talk to him about his kyara.     Physical Exam   Vital Signs: Temp: 97.8  F (36.6  C) Temp src: Oral BP: (!) 168/70 (RN notified) Pulse: 71   Resp: 18 SpO2: 95 % O2 Device: None (Room air)    Weight: 197 lbs 6.4 oz    General Appearance: Awake, alert, in no acute distress  Respiratory: CTAB, no wheeze  Cardiovascular: RRR, no murmur noted  GI: soft, nontender,  non distended, normal bowel sounds  Skin: no jaundice, no rash    Medical Decision Making       > 50 MINUTES SPENT BY ME on the date of service doing chart review, history, exam, documentation & further activities per the note.      Data     I have personally reviewed the following data over the past 24 hrs:    9.7  \   11.4 (L)   / 304     139 101 17.8 /  170 (H)   5.2 30 (H) 1.12 \

## 2023-01-02 NOTE — PLAN OF CARE
"PRIMARY DIAGNOSIS: \"GENERIC\" NURSING  OUTPATIENT/OBSERVATION GOALS TO BE MET BEFORE DISCHARGE:  1. ADLs back to baseline: Yes    2. Activity and level of assistance: Up with standby assistance.    3. Pain status: Pain free.    4. Return to near baseline physical activity: Yes     Discharge Planner Nurse   Safe discharge environment identified: Yes  Barriers to discharge: Yes       Entered by: Joyce Sung RN 01/01/2023 10:19 PM     Please review provider order for any additional goals.   Nurse to notify provider when observation goals have been met and patient is ready for discharge.  "

## 2023-01-02 NOTE — PROGRESS NOTES
Care Management Discharge Note    Discharge Date: 01/02/2023       Discharge Disposition: home with family     Discharge Services: none     Discharge DME: none     Discharge Transportation: family or friend will provide    Private pay costs discussed: Not applicable    Education Provided on the Discharge Plan: N/A   Persons Notified of Discharge Plans: N/A  Patient/Family in Agreement with the Plan: yes     Handoff Referral Completed: Yes    Additional Information:  1:44 PM  Chart reviewed. Pt lives at [facility] (senior living) with his spouse and mother-in-law. Pt is independent at baseline.Pt does not have any services. Therapy recommends home with assist. Pt has support at home. Pt will discharge home with family transport. No CM needs desired or anticipated.     CM will sign off. Please contact CM if any additional needs arise.    TANMAY Blackburn

## 2023-01-02 NOTE — PLAN OF CARE
"Goal Outcome Evaluation:  PRIMARY DIAGNOSIS: \"GENERIC\" NURSING  OUTPATIENT/OBSERVATION GOALS TO BE MET BEFORE DISCHARGE:  ADLs back to baseline: Yes    Activity and level of assistance: Ambulating independently.    Pain status: Pain free.    Return to near baseline physical activity: Yes     Discharge Planner Nurse   Safe discharge environment identified: Yes  Barriers to discharge: No       Entered by: Main Quevedo RN 01/02/2023 2:40 PM     Discharge home via wife. Explained to wife and pt all discharge instruction. No c/o pain. Vs is stable.             "

## 2023-01-02 NOTE — UTILIZATION REVIEW
Concurrent stay review; Secondary Review Determination     Under the authority of the Utilization Management Committee, the utilization review process indicated a secondary review on Montez Cabrera.  The review outcome is based on review of the medical records, discussions with staff, and applying clinical experience noted on the date of the review.        (x) Observation Status Appropriate - Concurrent stay review    RATIONALE FOR DETERMINATION   Montez Cabrera is a 72 yr old male with HTN, HLD, CKD, DM2, anxiety and asthma who presented with dysarthria and concern for TIA.  Now suspect related to hypoglycemia.  Remained for glucose monitoring and education with diabetic RN however now discharging.     Patient is clinically improving and there is no clear indication to change patient's status to inpatient. The severity of illness, intensity of service provided, expected LOS and risk for adverse outcome make the care appropriate for observation.    The information on this document is developed by the utilization review team in order for the business office to ensure compliance.  This only denotes the appropriateness of proper admission status and does not reflect the quality of care rendered.         The definitions of Inpatient Status and Observation Status used in making the determination above are those provided in the CMS Coverage Manual, Chapter 1 and Chapter 6, section 70.4.      Sincerely,   Maria Esther Caldwell MD  Utilization Review  Physician Advisor  [facility]

## 2023-01-02 NOTE — PROGRESS NOTES
PRIMARY DIAGNOSIS: TIA  OUTPATIENT/OBSERVATION GOALS TO BE MET BEFORE DISCHARGE:  1. ADLs back to baseline: Yes    2. Activity and level of assistance: SBA    3. Pain status: Denies    4. Return to near baseline physical activity: Yes     Discharge Planner Nurse   Safe discharge environment identified: Yes  Barriers to discharge: Yes       Entered by: Lily Ramirez RN 01/01/2023 6:30 PM

## 2023-01-02 NOTE — DISCHARGE INSTRUCTIONS
Diabetes Care:  1. Check blood sugar 4 x daily or use SHASHI 2;  goals before meals  (if check two hours after the start of meals goal is < 180).   2. Call doctor if 2 or more unexplained low blood sugars in a week or if blood sugar is over 400  3. Ask your primary care provider for a referral to see an outpatient diabetes educator to further diabetes management education.  4. Follow insulin regimen on discharge orders until able to see provider where doses may be adjusted based on blood sugar patterns.  5.  Keep extra insulin pens in fridge until use; rotate injection sites, use Sharps container or place in hard sided plastic container with a screw on cap, such as a laundry detergent bottle. Seal it and ishan it SHARPS and drop at collection site. [facility] at [address] [place]

## 2023-01-02 NOTE — PLAN OF CARE
PRIMARY DIAGNOSIS: ACUTE PAIN  OUTPATIENT/OBSERVATION GOALS TO BE MET BEFORE DISCHARGE:  1. Pain Status: Pain free.    2. Return to near baseline physical activity: Yes    3. Cleared for discharge by consultants (if involved): N/A    Discharge Planner Nurse   Safe discharge environment identified: Yes  Barriers to discharge: Yes. Blood sugar monitor       Entered by: Main Quevedo RN 01/02/2023 10:32 AM     Please review provider order for any additional goals.   Nurse to notify provider when observation goals have been met and patient is ready for discharge.Goal Outcome Evaluation:

## 2023-01-02 NOTE — PLAN OF CARE
"PRIMARY DIAGNOSIS: \"GENERIC\" NURSING  OUTPATIENT/OBSERVATION GOALS TO BE MET BEFORE DISCHARGE:  ADLs back to baseline: Yes    Activity and level of assistance: Up with standby assistance.    Pain status: Improved-controlled with oral pain medications.    Return to near baseline physical activity: Yes     Discharge Planner Nurse   Safe discharge environment identified: Yes  Barriers to discharge: Yes       Entered by: Stella Kwong RN 01/02/2023 1:21 AM   Watching for BG to stabilize.  Please review provider order for any additional goals.   Nurse to notify provider when observation goals have been met and patient is ready for discharge.Goal Outcome Evaluation:                        "

## 2023-01-02 NOTE — PLAN OF CARE
"PRIMARY DIAGNOSIS: \"GENERIC\" NURSING  OUTPATIENT/OBSERVATION GOALS TO BE MET BEFORE DISCHARGE:  ADLs back to baseline: Yes    Activity and level of assistance: Ambulating independently.    Pain status: Pain free.    Return to near baseline physical activity: Yes     Discharge Planner Nurse   Safe discharge environment identified: Yes  Barriers to discharge: Yes       Entered by: Stella Kwong RN 01/02/2023 6:08 AM   BG sourav MD discussed diet modifications with pt yesterday.  Please review provider order for any additional goals.   Nurse to notify provider when observation goals have been met and patient is ready for discharge.Goal Outcome Evaluation:                        "

## 2023-01-02 NOTE — PLAN OF CARE
Denies pain. Neuros intact. Up wit SBA to bathroom. Lung sounds expiratory wheezes. Albuterol inhaler given. Blood sugar 271 at hs. Sliding scale given per order.    Problem: Plan of Care - These are the overarching goals to be used throughout the patient stay.    Goal: Optimal Comfort and Wellbeing  Outcome: Progressing

## 2023-01-02 NOTE — CONSULTS
"DIABETES CARE    Situation:  Consulted by Provider for Diabetes Education.  Mr. Trevino is a 72-year-old male with history of hypertension, CKD 3, DM2, HLP, asthma, anxiety, depression and bipolar disorder who presents with dysarthria and concern for CVA/TIA.    Background:  PCP: Lori Cerrato,   Social: Lives with wife    Diabetes History:   Many years, wife is in charge of his meds.  He doesn't always take the second dose of Lantus, \"depends on blood sugar\"  Hasn't been taking the VIctoza    Meds for BG Management PTA:  Metformin ER 1000 mg bid  Amaryl 4 mg bid  Victoza 1.2 mg daily  30 units of Lantus bid    Current Inpatient Meds for BG Management:  30 units of Lantus in am  Amaryl and Metformin on hold  Novolog correction scale: 1/50 > 140 premeal, > 200 hs    Labs:  Hemoglobin A1C: 7.2%   GFR: 74 mL/min/1.73m^2    Blood Glucose POC:   Latest Reference Range & Units 01/01/23 07:46 01/01/23 12:57 01/01/23 16:42 01/01/23 21:34 01/02/23 07:36 01/02/23 08:34   GLUCOSE BY METER POCT 70 - 99 mg/dL 170 (H) 189 (H) 170 (H) 271 (H) 179 (H) 177 (H)   (H): Data is abnormally high    Diet Order: 60 grams CHO   Intake: 100%   Weight: 89.5 kg    BMI: 33.9 kg/m^2    DM EDUCATION/COUNSELING:  Current education and/or visit with patient and wife  Educated/reviewed diabetes basics: pathophysiology, hyperglycemia, long term complications, treatment.  Educated/reviewed hypoglycemia: sx, causes, treatment.  Explained normal/goals of blood sugar control, A1C, when to call provider.  Patient has a meter at home, checks BG am and hs. Has his Rossy with him, wife requesting instructions.  We went through the steps and she was able to apply it to the back of his arm. He used his cell phone for scanning.  Educated on normal pancreas function, how oral medications work, GLP1 med and possible need for mealtime insulin in future in addition to basal insulin but presently doing OK, especially if he resumes the Victoza.  Also " "discussed site rotation, proper storage, and safe needle disposal.   Carbohydrates were briefly discussed and their effect on BG. Reinforced healthy eating, consistent carbs.Talked about regular pop if he could change that habit and reinforced watching the juice intake. Used the nutritional placemat for education.  Written handouts given on all education provided.     Created goals with patient:  Scan Rossy at least every 8 hours, bring phone to appt so reports can be seen.   Have PCP refer to OP CDE for more DM management ed and support    Assessment:  Rossy CGMS will be a great help to patient to see what his habits do to his BG  He is willing to change regular soda habit    Recommendations:  1. Suggested to patient to not change the frequency of the basal insulin; holding it if BG is low.  2. To Follow-up with PCP after discharge and request referral for OP CDE  3. Continue with use of Rossy CGMS and show to PCP reports for further assessment of glucose patterns, need for dose adjustments    Refer to \"Guidelines for Insulin Initiation and Care in Hospitalized Adults\"  link in Diabetes Management Order set for dosing guidelines.    Hospital goals for blood glucose levels are < 180 mg/dL for improved health outcomes.    Thank you,     Shannon Sutherland RN, Certified Diabetes Care and     [address] [address]  [email or web address]   Office: [phone]  Pager: [phone]                                      "

## 2023-01-04 ENCOUNTER — APPOINTMENT (OUTPATIENT)
Dept: MRI IMAGING | Facility: HOSPITAL | Age: 73
End: 2023-01-04
Attending: EMERGENCY MEDICINE
Payer: COMMERCIAL

## 2023-01-04 ENCOUNTER — APPOINTMENT (OUTPATIENT)
Dept: RADIOLOGY | Facility: HOSPITAL | Age: 73
End: 2023-01-04
Attending: INTERNAL MEDICINE
Payer: COMMERCIAL

## 2023-01-04 ENCOUNTER — HOSPITAL ENCOUNTER (OUTPATIENT)
Facility: HOSPITAL | Age: 73
Setting detail: OBSERVATION
Discharge: HOME | End: 2023-01-06
Attending: EMERGENCY MEDICINE | Admitting: INTERNAL MEDICINE
Payer: COMMERCIAL

## 2023-01-04 DIAGNOSIS — G45.9 TIA (TRANSIENT ISCHEMIC ATTACK): ICD-10-CM

## 2023-01-04 DIAGNOSIS — R42 DIZZINESS: ICD-10-CM

## 2023-01-04 DIAGNOSIS — R42 DYSEQUILIBRIUM: Primary | ICD-10-CM

## 2023-01-04 DIAGNOSIS — N17.9 ACUTE KIDNEY INJURY (H): ICD-10-CM

## 2023-01-04 DIAGNOSIS — R47.81 SLURRED SPEECH: ICD-10-CM

## 2023-01-04 PROBLEM — F41.8 MIXED ANXIETY AND DEPRESSIVE DISORDER: Status: ACTIVE | Noted: 2021-07-20

## 2023-01-04 PROBLEM — E78.5 HYPERLIPIDEMIA: Status: ACTIVE | Noted: 2021-07-20

## 2023-01-04 PROBLEM — I10 ESSENTIAL HYPERTENSION: Status: ACTIVE | Noted: 2021-07-20

## 2023-01-04 PROBLEM — K31.9 DISORDER OF STOMACH: Status: ACTIVE | Noted: 2022-02-24

## 2023-01-04 PROBLEM — K30 INDIGESTION: Status: ACTIVE | Noted: 2022-02-24

## 2023-01-04 PROBLEM — M16.10 PRIMARY LOCALIZED OSTEOARTHRITIS OF PELVIC REGION AND THIGH: Status: ACTIVE | Noted: 2021-07-20

## 2023-01-04 PROBLEM — J45.40 MODERATE PERSISTENT ASTHMA WITHOUT COMPLICATION: Status: ACTIVE | Noted: 2021-07-20

## 2023-01-04 PROBLEM — G47.00 INSOMNIA: Status: ACTIVE | Noted: 2020-04-24

## 2023-01-04 PROBLEM — E53.8 VITAMIN B12 DEFICIENCY (NON ANEMIC): Status: ACTIVE | Noted: 2021-07-20

## 2023-01-04 PROBLEM — K29.70 GASTRITIS: Status: ACTIVE | Noted: 2022-02-28

## 2023-01-04 PROBLEM — F31.9 BIPOLAR 1 DISORDER (H): Status: ACTIVE | Noted: 2017-09-15

## 2023-01-04 LAB
ALBUMIN SERPL BCG-MCNC: 4.4 G/DL (ref 3.5–5.2)
ALP SERPL-CCNC: 106 U/L (ref 40–129)
ALT SERPL W P-5'-P-CCNC: 22 U/L (ref 10–50)
ANION GAP SERPL CALCULATED.3IONS-SCNC: 11 MMOL/L (ref 7–15)
ANION GAP SERPL CALCULATED.3IONS-SCNC: 12 MMOL/L (ref 7–15)
AST SERPL W P-5'-P-CCNC: 22 U/L (ref 10–50)
BASOPHILS # BLD AUTO: 0.1 10E3/UL (ref 0–0.2)
BASOPHILS NFR BLD AUTO: 1 %
BILIRUB DIRECT SERPL-MCNC: <0.2 MG/DL (ref 0–0.3)
BILIRUB SERPL-MCNC: 0.2 MG/DL
BUN SERPL-MCNC: 55.6 MG/DL (ref 8–23)
BUN SERPL-MCNC: 56.3 MG/DL (ref 8–23)
CALCIUM SERPL-MCNC: 8.7 MG/DL (ref 8.8–10.2)
CALCIUM SERPL-MCNC: 9.3 MG/DL (ref 8.8–10.2)
CHLORIDE SERPL-SCNC: 101 MMOL/L (ref 98–107)
CHLORIDE SERPL-SCNC: 104 MMOL/L (ref 98–107)
CREAT SERPL-MCNC: 2.12 MG/DL (ref 0.67–1.17)
CREAT SERPL-MCNC: 2.24 MG/DL (ref 0.67–1.17)
DEPRECATED HCO3 PLAS-SCNC: 22 MMOL/L (ref 22–29)
DEPRECATED HCO3 PLAS-SCNC: 25 MMOL/L (ref 22–29)
EOSINOPHIL # BLD AUTO: 0.3 10E3/UL (ref 0–0.7)
EOSINOPHIL NFR BLD AUTO: 3 %
ERYTHROCYTE [DISTWIDTH] IN BLOOD BY AUTOMATED COUNT: 11.9 % (ref 10–15)
ETHANOL SERPL-MCNC: <0.01 G/DL
GFR SERPL CREATININE-BSD FRML MDRD: 30 ML/MIN/1.73M2
GFR SERPL CREATININE-BSD FRML MDRD: 32 ML/MIN/1.73M2
GLUCOSE BLDC GLUCOMTR-MCNC: 115 MG/DL (ref 70–99)
GLUCOSE SERPL-MCNC: 173 MG/DL (ref 70–99)
GLUCOSE SERPL-MCNC: 275 MG/DL (ref 70–99)
HCT VFR BLD AUTO: 41.1 % (ref 40–53)
HGB BLD-MCNC: 13 G/DL (ref 13.3–17.7)
IMM GRANULOCYTES # BLD: 0.1 10E3/UL
IMM GRANULOCYTES NFR BLD: 1 %
INR PPP: 1.08 (ref 0.85–1.15)
LYMPHOCYTES # BLD AUTO: 2.2 10E3/UL (ref 0.8–5.3)
LYMPHOCYTES NFR BLD AUTO: 20 %
MCH RBC QN AUTO: 31.6 PG (ref 26.5–33)
MCHC RBC AUTO-ENTMCNC: 31.6 G/DL (ref 31.5–36.5)
MCV RBC AUTO: 100 FL (ref 78–100)
MONOCYTES # BLD AUTO: 0.8 10E3/UL (ref 0–1.3)
MONOCYTES NFR BLD AUTO: 7 %
NEUTROPHILS # BLD AUTO: 7.8 10E3/UL (ref 1.6–8.3)
NEUTROPHILS NFR BLD AUTO: 68 %
NRBC # BLD AUTO: 0 10E3/UL
NRBC BLD AUTO-RTO: 0 /100
PLATELET # BLD AUTO: 401 10E3/UL (ref 150–450)
POTASSIUM SERPL-SCNC: 5.1 MMOL/L (ref 3.4–5.3)
POTASSIUM SERPL-SCNC: 5.7 MMOL/L (ref 3.4–5.3)
PROT SERPL-MCNC: 7.7 G/DL (ref 6.4–8.3)
RBC # BLD AUTO: 4.11 10E6/UL (ref 4.4–5.9)
SODIUM SERPL-SCNC: 137 MMOL/L (ref 136–145)
SODIUM SERPL-SCNC: 138 MMOL/L (ref 136–145)
TROPONIN T SERPL HS-MCNC: 53 NG/L
TROPONIN T SERPL HS-MCNC: 63 NG/L
WBC # BLD AUTO: 11.2 10E3/UL (ref 4–11)

## 2023-01-04 PROCEDURE — 84484 ASSAY OF TROPONIN QUANT: CPT | Performed by: EMERGENCY MEDICINE

## 2023-01-04 PROCEDURE — 82248 BILIRUBIN DIRECT: CPT | Performed by: EMERGENCY MEDICINE

## 2023-01-04 PROCEDURE — 36415 COLL VENOUS BLD VENIPUNCTURE: CPT | Performed by: EMERGENCY MEDICINE

## 2023-01-04 PROCEDURE — G0378 HOSPITAL OBSERVATION PER HR: HCPCS

## 2023-01-04 PROCEDURE — 96361 HYDRATE IV INFUSION ADD-ON: CPT

## 2023-01-04 PROCEDURE — 93005 ELECTROCARDIOGRAM TRACING: CPT | Performed by: EMERGENCY MEDICINE

## 2023-01-04 PROCEDURE — 85025 COMPLETE CBC W/AUTO DIFF WBC: CPT | Performed by: EMERGENCY MEDICINE

## 2023-01-04 PROCEDURE — 82962 GLUCOSE BLOOD TEST: CPT

## 2023-01-04 PROCEDURE — 258N000003 HC RX IP 258 OP 636: Performed by: INTERNAL MEDICINE

## 2023-01-04 PROCEDURE — 70551 MRI BRAIN STEM W/O DYE: CPT | Mod: MG

## 2023-01-04 PROCEDURE — 99285 EMERGENCY DEPT VISIT HI MDM: CPT | Mod: 25

## 2023-01-04 PROCEDURE — 99221 1ST HOSP IP/OBS SF/LOW 40: CPT | Performed by: INTERNAL MEDICINE

## 2023-01-04 PROCEDURE — 72100 X-RAY EXAM L-S SPINE 2/3 VWS: CPT

## 2023-01-04 PROCEDURE — 82077 ASSAY SPEC XCP UR&BREATH IA: CPT | Performed by: EMERGENCY MEDICINE

## 2023-01-04 PROCEDURE — G1010 CDSM STANSON: HCPCS

## 2023-01-04 PROCEDURE — 85610 PROTHROMBIN TIME: CPT | Performed by: EMERGENCY MEDICINE

## 2023-01-04 PROCEDURE — 250N000013 HC RX MED GY IP 250 OP 250 PS 637: Performed by: EMERGENCY MEDICINE

## 2023-01-04 PROCEDURE — 72070 X-RAY EXAM THORAC SPINE 2VWS: CPT

## 2023-01-04 PROCEDURE — 80053 COMPREHEN METABOLIC PANEL: CPT | Performed by: EMERGENCY MEDICINE

## 2023-01-04 PROCEDURE — 96360 HYDRATION IV INFUSION INIT: CPT

## 2023-01-04 PROCEDURE — 258N000003 HC RX IP 258 OP 636: Performed by: EMERGENCY MEDICINE

## 2023-01-04 RX ORDER — ACETAMINOPHEN 325 MG/1
650 TABLET ORAL EVERY 6 HOURS PRN
Status: DISCONTINUED | OUTPATIENT
Start: 2023-01-04 | End: 2023-01-06 | Stop reason: HOSPADM

## 2023-01-04 RX ORDER — AMOXICILLIN 250 MG
1 CAPSULE ORAL 2 TIMES DAILY PRN
Status: DISCONTINUED | OUTPATIENT
Start: 2023-01-04 | End: 2023-01-06 | Stop reason: HOSPADM

## 2023-01-04 RX ORDER — SODIUM CHLORIDE 9 MG/ML
INJECTION, SOLUTION INTRAVENOUS CONTINUOUS
Status: DISCONTINUED | OUTPATIENT
Start: 2023-01-04 | End: 2023-01-05

## 2023-01-04 RX ORDER — DEXTROSE MONOHYDRATE 25 G/50ML
25-50 INJECTION, SOLUTION INTRAVENOUS
Status: DISCONTINUED | OUTPATIENT
Start: 2023-01-04 | End: 2023-01-06 | Stop reason: HOSPADM

## 2023-01-04 RX ORDER — NICOTINE POLACRILEX 4 MG
15-30 LOZENGE BUCCAL
Status: DISCONTINUED | OUTPATIENT
Start: 2023-01-04 | End: 2023-01-06 | Stop reason: HOSPADM

## 2023-01-04 RX ORDER — ASPIRIN 81 MG/1
81 TABLET, CHEWABLE ORAL ONCE
Status: COMPLETED | OUTPATIENT
Start: 2023-01-04 | End: 2023-01-04

## 2023-01-04 RX ORDER — ONDANSETRON 4 MG/1
4 TABLET, ORALLY DISINTEGRATING ORAL EVERY 6 HOURS PRN
Status: DISCONTINUED | OUTPATIENT
Start: 2023-01-04 | End: 2023-01-06 | Stop reason: HOSPADM

## 2023-01-04 RX ORDER — MECLIZINE HCL 12.5 MG 12.5 MG/1
25 TABLET ORAL ONCE
Status: COMPLETED | OUTPATIENT
Start: 2023-01-04 | End: 2023-01-04

## 2023-01-04 RX ORDER — ONDANSETRON 2 MG/ML
4 INJECTION INTRAMUSCULAR; INTRAVENOUS EVERY 6 HOURS PRN
Status: DISCONTINUED | OUTPATIENT
Start: 2023-01-04 | End: 2023-01-06 | Stop reason: HOSPADM

## 2023-01-04 RX ORDER — ACETAMINOPHEN 650 MG/1
650 SUPPOSITORY RECTAL EVERY 6 HOURS PRN
Status: DISCONTINUED | OUTPATIENT
Start: 2023-01-04 | End: 2023-01-06 | Stop reason: HOSPADM

## 2023-01-04 RX ORDER — AMOXICILLIN 250 MG
2 CAPSULE ORAL 2 TIMES DAILY PRN
Status: DISCONTINUED | OUTPATIENT
Start: 2023-01-04 | End: 2023-01-06 | Stop reason: HOSPADM

## 2023-01-04 RX ADMIN — ASPIRIN 81 MG CHEWABLE TABLET 81 MG: 81 TABLET CHEWABLE at 16:51

## 2023-01-04 RX ADMIN — SODIUM CHLORIDE 1000 ML: 9 INJECTION, SOLUTION INTRAVENOUS at 15:01

## 2023-01-04 RX ADMIN — SODIUM CHLORIDE: 9 INJECTION, SOLUTION INTRAVENOUS at 21:45

## 2023-01-04 RX ADMIN — MECLIZINE 25 MG: 12.5 TABLET ORAL at 13:51

## 2023-01-04 ASSESSMENT — ENCOUNTER SYMPTOMS
WEAKNESS: 0
NUMBNESS: 0
DIZZINESS: 1
SHORTNESS OF BREATH: 0
NAUSEA: 0
SPEECH DIFFICULTY: 1

## 2023-01-04 ASSESSMENT — ACTIVITIES OF DAILY LIVING (ADL)
ADLS_ACUITY_SCORE: 37
ADLS_ACUITY_SCORE: 35
ADLS_ACUITY_SCORE: 43

## 2023-01-04 NOTE — ED NOTES
ED Provider In Triage Note  [facility]  Encounter Date: Jan 4, 2023    Chief Complaint   Patient presents with     Dizziness       Brief HPI:   Montez Cabrera is a 72 year old male presenting to the Emergency Department with a chief complaint of sudden onset vertigo yesterday at noon. No vision changes, speech changes, or loss of sensation or strength in body anywhere. No blood thinners, no head trauma or falls, no prior vertigo.    Brief Physical Exam:  General: Non-toxic appearing  HEENT: Atraumatic  Resp: No respiratory distress  Abdomen: Non-peritoneal  Neuro: Alert, oriented, answers questions appropriately. Equal normal sensation both arms and legs and both sides of lower face, smile normal, A&O x4, strength 5/5 in both arms and legs, gaze normal.  Psych: Behavior appropriate      Plan Initiated in Triage:  Orders Placed This Encounter     MR Brain w/o Contrast     MRA Brain ([place] [facility] [place]) wo & w Contrast     Basic metabolic panel     meclizine (ANTIVERT) tablet 25 mg       PIT Dispo:   Return to lobby while awaiting workup and ED bed availability    Marija Mckay MD on 1/4/2023 at 1:12 PM    Patient was evaluated by the Physician in Triage due to a limitation of available rooms in the Emergency Department. A plan of care was discussed based on the information obtained on the initial evaluation and patient was consuled to return back to the Emergency Department lobby after this initial evalutaiton until results were obtained or a room became available in the Emergency Department. Patient was counseled not to leave prior to receiving the results of their workup.     Marija Mckay MD  [facility] EMERGENCY DEPARTMENT  [address] [place] [id number]  [phone]     Marija Mckay MD  01/04/23 8492    2:53 PM Patient with ANAMARIA with K 5.7, NS bolus begun and EKG ordered, will repeat BMP after IVF hydration.     Marija Mckay MD  01/04/23  9026

## 2023-01-04 NOTE — PROGRESS NOTES
Diabetes Care 1/4    TCF wife with a question about the Rossy alarming signal loss.  Troubleshooted with her but suggested she call the customer service for Rossy as if she has to change this one out, likely they will send her a new one.  Also, stated her  was dizzy, lightheaded and fatigued. BG over 200 this so not hypoglycemia. Encouraged her to call his doctor's office for direction.  He did start on Plavix when he left hospital as a new med.    Shannon Sutherland RN, Certified Diabetes Care and     [address] [address]  [email or web address]   Office: [phone]  Pager: [phone]

## 2023-01-04 NOTE — ED TRIAGE NOTES
Arrived via ems. Discharged yesterday with uti and unable to funtion at home. Dizzy with standing. Oxygen 90 room air, 98 2 liters. /62

## 2023-01-04 NOTE — ED TRIAGE NOTES
Patient c/o dizziness when standing up position ( envronment spinning) yesterday around 1230 .  No stroke code per MD.      Triage Assessment     Row Name 01/04/23 1315       Triage Assessment (Adult)    Airway WDL WDL       Respiratory WDL    Respiratory WDL WDL       Skin Circulation/Temperature WDL    Skin Circulation/Temperature WDL WDL       Cardiac WDL    Cardiac WDL WDL       Peripheral/Neurovascular WDL    Peripheral Neurovascular WDL WDL       Cognitive/Neuro/Behavioral WDL    Cognitive/Neuro/Behavioral WDL WDL

## 2023-01-04 NOTE — ED PROVIDER NOTES
EMERGENCY DEPARTMENT ENCOUNTER      NAME: Montez Cabrera  AGE: 72 year old male  YOB: 1950  MRN: [id number]  EVALUATION DATE & TIME: No admission date for patient encounter.    PCP: Lori Cerrato    ED PROVIDER: Durga Copeland M.D.      Chief Complaint   Patient presents with     Dizziness         FINAL IMPRESSION:  1. TIA (transient ischemic attack)    2. Dizziness    3. Slurred speech          ED COURSE & MEDICAL DECISION MAKING:    Pertinent Labs & Imaging studies reviewed. (See chart for details)  ED Course as of 01/04/23 1731 Wed Jan 04, 2023   1626 Patient is a 72-year-old gentleman with history of TIA, presenting with about 24 hours of positional dizziness, slurred speech.  Currently feels well sitting still, but becomes symptomatic when he turns his head quickly or stands up.  He has normal vital signs on arrival here, initial lab work shows acute kidney injury with a creatinine of 2.24, mildly elevated potassium at 5.7.  IV hydration has been started.  His neurologic exam shows horizontal nystagmus with bilateral gaze deviation, otherwise very mild right ptosis, and no other focal deficit.  His speech is very mildly slurred.  MRI has been completed, no evidence of dissection, occlusion, ischemic event.  Given slurred speech, ANAMARIA, plan admit for TIA, ANAMARIA.   1650 Troponin T, High Sensitivity(!): 63  Will order repeat   1701 We called neighboring facilities, no available beds.  We will admit here.   Repeat troponin returned at 33.  Patient be admitted for TIA, ANAMARIA.  He has been given a liter of fluids here.  He denies any recent chest pain or chest pressure.  I do not think the drop in troponin represents recent ACS.  80 mg of aspirin ordered.  Admitted to neuro telemetry.    On review of the chart, he was recently admitted for similar symptoms, started on antiplatelets.  Discharged 2 days ago.  The patient did not mention any of this to me during our initial  interview.      Additional ED Course Timestamps:  4:12 PM I met with the patient to obtain patient history and performed a physical exam. Discussed plan for ED work up including potential diagnostic studies and interventions.    Medical Decision Making    History:    Supplemental history from: Documented in HPI, if applicable    External Record(s) reviewed: Documented in HPI, if applicable.    Work Up:    Chart documentation includes differential considered and any EKGs or imaging interpreted by provider.    In additional to work up documented, I considered the following work up: See chart documentation, if applicable.    External consultation:    Discussion of management with another provider: See chart documentation, if applicable    Complicating factors:    Care impacted by chronic illness: N/A    Care affected by social determinants of health: N/A    Disposition considerations: Admit.      At the conclusion of the encounter I discussed the results of all of the tests and the disposition. The questions were answered. The patient or family acknowledged understanding and was agreeable with the care plan.     MEDICATIONS GIVEN IN THE EMERGENCY:  Medications   aspirin (ASA) chewable tablet 81 mg (has no administration in time range)   meclizine (ANTIVERT) tablet 25 mg (25 mg Oral Given 1/4/23 1351)   0.9% sodium chloride BOLUS (1,000 mLs Intravenous New Bag 1/4/23 1501)         NEW PRESCRIPTIONS STARTED AT TODAY'S ER VISIT  New Prescriptions    No medications on file          =================================================================    HPI    Patient information was obtained from: patient    Use of : N/A        Montez Cabrera is a 72 year old male with a pertinent history of TIA, mixed hyperlipidemia, controlled type 2 diabetes mellitus, essential hypertension, who presents to this ED for evaluation of dizziness.    Patient reports persistent sudden onset dizziness since yesterday. He says he woke  "up feeling fine but in the afternoon, he experienced the onset sudden dizziness. He said that due to dizziness, he was unable to walk around. He describes the dizziness as the \"room spinning.\" He says he is not experiencing any dizziness currently while sitting down but it's worse when turning his head. He also endorses slurred speech since yesterday. He says this feels similar to when he had a TIA previously which also displayed symptoms of dizziness and slurred speech. He denies facial numbness, focal weakness in extremities, nausea, chest pain, shortness of breath, and recent falls. He does not use aspirin or alcohol. There were no other concerns/complaints at this time.               REVIEW OF SYSTEMS   Review of Systems   Respiratory: Negative for shortness of breath.    Cardiovascular: Negative for chest pain.   Gastrointestinal: Negative for nausea.   Neurological: Positive for dizziness (room spinning) and speech difficulty (slurred speech). Negative for weakness and numbness (facial).   All other systems reviewed and are negative.       PAST MEDICAL HISTORY:  Past Medical History:   Diagnosis Date     Anxiety 04/24/2020     Asthma exacerbation 09/11/2017     Bipolar I disorder (H) 04/24/2020     Controlled type 2 diabetes mellitus, without long-term current use of insulin (H) 09/11/2017     Mixed hyperlipidemia      Recurrent major depressive disorder (H) 09/11/2017     Respiratory insufficiency 09/11/2017       PAST SURGICAL HISTORY:  Past Surgical History:   Procedure Laterality Date     FOOT SURGERY       OTHER SURGICAL HISTORY      ARM SURGERY           CURRENT MEDICATIONS:    Current Facility-Administered Medications   Medication     aspirin (ASA) chewable tablet 81 mg     Current Outpatient Medications   Medication     albuterol (PROAIR HFA/PROVENTIL HFA/VENTOLIN HFA) 108 (90 Base) MCG/ACT inhaler     ARIPiprazole (ABILIFY) 20 MG tablet     aspirin (ASA) 325 MG EC tablet     atorvastatin (LIPITOR) 10 " "MG tablet     brimonidine-timolol (COMBIGAN) 0.2-0.5 % ophthalmic solution     clopidogrel (PLAVIX) 75 MG tablet     escitalopram (LEXAPRO) 20 MG tablet     gabapentin (NEURONTIN) 400 MG capsule     insulin glargine (LANTUS PEN) 100 UNIT/ML pen     lamoTRIgine (LAMICTAL) 100 MG tablet     liraglutide (VICTOZA) 18 MG/3ML solution     lisinopril (ZESTRIL) 10 MG tablet     metFORMIN (GLUCOPHAGE-XR) 500 MG 24 hr tablet     montelukast (SINGULAIR) 10 MG tablet     OLANZapine (ZYPREXA) 10 MG tablet     traZODone (DESYREL) 150 MG tablet     venlafaxine (EFFEXOR-XR) 75 MG 24 hr capsule       ALLERGIES:  Allergies   Allergen Reactions     Bupropion Diarrhea     Erythromycin Diarrhea     Theophylline GI Disturbance       FAMILY HISTORY:  No family history on file.    SOCIAL HISTORY:   Social History     Socioeconomic History     Marital status:    Tobacco Use     Smoking status: Former     Types: Cigarettes     Quit date: 1977     Years since quittin.3     Smokeless tobacco: Never   Substance and Sexual Activity     Alcohol use: No     Comment: Alcoholic Drinks/day: quit years ago     Drug use: No   Social History Narrative    retired       VITALS:  /63   Pulse 82   Temp 98.1  F (36.7  C) (Oral)   Resp 20   Ht 1.626 m (5' 4\")   Wt 88.5 kg (195 lb)   SpO2 94%   BMI 33.47 kg/m      PHYSICAL EXAM    Constitutional: Well developed, well nourished. Comfortable appearing.  HENT: Normocephalic, atraumatic, mucous membranes moist, nose normal. Neck- Supple, gross ROM intact.   Eyes: Pupils mid-range, conjunctiva without injection, no discharge.   Respiratory: Clear to auscultation bilaterally, no respiratory distress, no wheezing, speaks full sentences easily. No cough.  Cardiovascular: Normal heart rate, regular rhythm, no murmurs.   GI: Soft, no tenderness to deep palpation in all quadrants, no masses.  Musculoskeletal: Moving all 4 extremities intentionally and without pain. No obvious " deformity.  Skin: Warm, dry, no rash.  Neurologic: Very mild right eyelid droop, but otherwise CN II-XII intact. 5/5 strength in upper and lower extremities. No dysarthria or dysphagia. Sensation to light touch intact in all 4 extremities. No pronator drift. Normal rapid alternating movements bilaterally. Steady gait.  Psychiatric: Affect normal, cooperative.           LAB:  All pertinent labs reviewed and interpreted.  Labs Ordered and Resulted from Time of ED Arrival to Time of ED Departure   BASIC METABOLIC PANEL - Abnormal       Result Value    Sodium 137      Potassium 5.7 (*)     Chloride 101      Carbon Dioxide (CO2) 25      Anion Gap 11      Urea Nitrogen 56.3 (*)     Creatinine 2.24 (*)     Calcium 9.3      Glucose 275 (*)     GFR Estimate 30 (*)    CBC WITH PLATELETS AND DIFFERENTIAL - Abnormal    WBC Count 11.2 (*)     RBC Count 4.11 (*)     Hemoglobin 13.0 (*)     Hematocrit 41.1            MCH 31.6      MCHC 31.6      RDW 11.9      Platelet Count 401      % Neutrophils 68      % Lymphocytes 20      % Monocytes 7      % Eosinophils 3      % Basophils 1      % Immature Granulocytes 1      NRBCs per 100 WBC 0      Absolute Neutrophils 7.8      Absolute Lymphocytes 2.2      Absolute Monocytes 0.8      Absolute Eosinophils 0.3      Absolute Basophils 0.1      Absolute Immature Granulocytes 0.1      Absolute NRBCs 0.0     BASIC METABOLIC PANEL   ROUTINE UA WITH MICROSCOPIC REFLEX TO CULTURE   TROPONIN T, HIGH SENSITIVITY   INR   ETHYL ALCOHOL LEVEL   HEPATIC FUNCTION PANEL       RADIOLOGY:  Reviewed all pertinent imaging. Please see official radiology report.  MRA Brain ([place] [facility] [place]) wo Contrast   Final Result   IMPRESSION:   1.  Normal MRA [place] [facility] [place].      MR Brain w/o Contrast   Final Result   IMPRESSION:   1.  No acute intracranial process.   2.  Generalized brain atrophy and presumed microvascular ischemic changes as detailed above.          EKG:    All EKG interpretations will be found  in ED course above.      I, Carmen Head am serving as a scribe to document services personally performed by Dr. Durga Copeland based on my observation and the provider's statements to me. I, Durga Copeland MD attest that Carmen Head is acting in a scribe capacity, has observed my performance of the services and has documented them in accordance with my direction.    Durga Copeland M.D.  Emergency Medicine  [facility] EMERGENCY DEPARTMENT  [address] [place] [id number]  [phone]  Dept: [phone]     Durga Copeland MD  01/04/23 4826

## 2023-01-05 ENCOUNTER — APPOINTMENT (OUTPATIENT)
Dept: PHYSICAL THERAPY | Facility: HOSPITAL | Age: 73
End: 2023-01-05
Attending: INTERNAL MEDICINE
Payer: COMMERCIAL

## 2023-01-05 ENCOUNTER — APPOINTMENT (OUTPATIENT)
Dept: PHYSICAL THERAPY | Facility: HOSPITAL | Age: 73
End: 2023-01-05
Payer: COMMERCIAL

## 2023-01-05 PROBLEM — R42 DYSEQUILIBRIUM: Status: ACTIVE | Noted: 2023-01-05

## 2023-01-05 PROBLEM — F33.9 RECURRENT MAJOR DEPRESSIVE DISORDER (H): Status: ACTIVE | Noted: 2017-09-11

## 2023-01-05 LAB
ALBUMIN UR-MCNC: 10 MG/DL
ANION GAP SERPL CALCULATED.3IONS-SCNC: 10 MMOL/L (ref 7–15)
APPEARANCE UR: CLEAR
BILIRUB UR QL STRIP: NEGATIVE
BUN SERPL-MCNC: 41.5 MG/DL (ref 8–23)
CALCIUM SERPL-MCNC: 8.6 MG/DL (ref 8.8–10.2)
CHLORIDE SERPL-SCNC: 107 MMOL/L (ref 98–107)
COLOR UR AUTO: ABNORMAL
CREAT SERPL-MCNC: 1.34 MG/DL (ref 0.67–1.17)
DEPRECATED HCO3 PLAS-SCNC: 22 MMOL/L (ref 22–29)
GFR SERPL CREATININE-BSD FRML MDRD: 56 ML/MIN/1.73M2
GLUCOSE BLDC GLUCOMTR-MCNC: 120 MG/DL (ref 70–99)
GLUCOSE BLDC GLUCOMTR-MCNC: 132 MG/DL (ref 70–99)
GLUCOSE BLDC GLUCOMTR-MCNC: 165 MG/DL (ref 70–99)
GLUCOSE BLDC GLUCOMTR-MCNC: 171 MG/DL (ref 70–99)
GLUCOSE BLDC GLUCOMTR-MCNC: 196 MG/DL (ref 70–99)
GLUCOSE SERPL-MCNC: 123 MG/DL (ref 70–99)
GLUCOSE UR STRIP-MCNC: NEGATIVE MG/DL
HGB UR QL STRIP: NEGATIVE
HOLD SPECIMEN: NORMAL
HYALINE CASTS: 3 /LPF
KETONES UR STRIP-MCNC: NEGATIVE MG/DL
LEUKOCYTE ESTERASE UR QL STRIP: NEGATIVE
MUCOUS THREADS #/AREA URNS LPF: PRESENT /LPF
NITRATE UR QL: NEGATIVE
PH UR STRIP: 5 [PH] (ref 5–7)
POTASSIUM SERPL-SCNC: 4.7 MMOL/L (ref 3.4–5.3)
RBC URINE: 6 /HPF
SODIUM SERPL-SCNC: 139 MMOL/L (ref 136–145)
SP GR UR STRIP: 1.02 (ref 1–1.03)
UROBILINOGEN UR STRIP-MCNC: <2 MG/DL
WBC URINE: 4 /HPF

## 2023-01-05 PROCEDURE — 96372 THER/PROPH/DIAG INJ SC/IM: CPT | Performed by: INTERNAL MEDICINE

## 2023-01-05 PROCEDURE — 81001 URINALYSIS AUTO W/SCOPE: CPT | Performed by: EMERGENCY MEDICINE

## 2023-01-05 PROCEDURE — 99233 SBSQ HOSP IP/OBS HIGH 50: CPT | Performed by: PSYCHIATRY & NEUROLOGY

## 2023-01-05 PROCEDURE — 97112 NEUROMUSCULAR REEDUCATION: CPT | Mod: GP

## 2023-01-05 PROCEDURE — 97110 THERAPEUTIC EXERCISES: CPT | Mod: GP

## 2023-01-05 PROCEDURE — 36415 COLL VENOUS BLD VENIPUNCTURE: CPT | Performed by: INTERNAL MEDICINE

## 2023-01-05 PROCEDURE — 80048 BASIC METABOLIC PNL TOTAL CA: CPT | Performed by: INTERNAL MEDICINE

## 2023-01-05 PROCEDURE — 97530 THERAPEUTIC ACTIVITIES: CPT | Mod: GP

## 2023-01-05 PROCEDURE — 250N000013 HC RX MED GY IP 250 OP 250 PS 637: Performed by: INTERNAL MEDICINE

## 2023-01-05 PROCEDURE — 250N000012 HC RX MED GY IP 250 OP 636 PS 637: Performed by: INTERNAL MEDICINE

## 2023-01-05 PROCEDURE — G0378 HOSPITAL OBSERVATION PER HR: HCPCS

## 2023-01-05 PROCEDURE — 97162 PT EVAL MOD COMPLEX 30 MIN: CPT | Mod: GP

## 2023-01-05 PROCEDURE — 250N000009 HC RX 250: Performed by: INTERNAL MEDICINE

## 2023-01-05 PROCEDURE — 97116 GAIT TRAINING THERAPY: CPT | Mod: GP

## 2023-01-05 PROCEDURE — 82962 GLUCOSE BLOOD TEST: CPT | Mod: 91

## 2023-01-05 PROCEDURE — 99231 SBSQ HOSP IP/OBS SF/LOW 25: CPT | Performed by: INTERNAL MEDICINE

## 2023-01-05 RX ORDER — MECLIZINE HCL 12.5 MG 12.5 MG/1
12.5 TABLET ORAL 3 TIMES DAILY PRN
Status: DISCONTINUED | OUTPATIENT
Start: 2023-01-05 | End: 2023-01-06 | Stop reason: HOSPADM

## 2023-01-05 RX ORDER — VENLAFAXINE HYDROCHLORIDE 150 MG/1
150 CAPSULE, EXTENDED RELEASE ORAL
Status: DISCONTINUED | OUTPATIENT
Start: 2023-01-05 | End: 2023-01-06 | Stop reason: HOSPADM

## 2023-01-05 RX ORDER — GABAPENTIN 300 MG/1
1200 CAPSULE ORAL 2 TIMES DAILY
Status: DISCONTINUED | OUTPATIENT
Start: 2023-01-05 | End: 2023-01-06 | Stop reason: HOSPADM

## 2023-01-05 RX ORDER — MECLIZINE HCL 12.5 MG 12.5 MG/1
12.5 TABLET ORAL 3 TIMES DAILY
Status: DISCONTINUED | OUTPATIENT
Start: 2023-01-05 | End: 2023-01-05

## 2023-01-05 RX ORDER — OLANZAPINE 10 MG/1
10 TABLET ORAL AT BEDTIME
Status: DISCONTINUED | OUTPATIENT
Start: 2023-01-05 | End: 2023-01-06 | Stop reason: HOSPADM

## 2023-01-05 RX ORDER — LIRAGLUTIDE 6 MG/ML
1.2 INJECTION SUBCUTANEOUS DAILY
Status: DISCONTINUED | OUTPATIENT
Start: 2023-01-05 | End: 2023-01-06 | Stop reason: HOSPADM

## 2023-01-05 RX ORDER — BRIMONIDINE TARTRATE AND TIMOLOL MALEATE 2; 5 MG/ML; MG/ML
1 SOLUTION OPHTHALMIC 2 TIMES DAILY
Status: DISCONTINUED | OUTPATIENT
Start: 2023-01-05 | End: 2023-01-06 | Stop reason: HOSPADM

## 2023-01-05 RX ORDER — MONTELUKAST SODIUM 10 MG/1
10 TABLET ORAL AT BEDTIME
Status: DISCONTINUED | OUTPATIENT
Start: 2023-01-05 | End: 2023-01-06 | Stop reason: HOSPADM

## 2023-01-05 RX ORDER — HYDRALAZINE HYDROCHLORIDE 25 MG/1
25 TABLET, FILM COATED ORAL 3 TIMES DAILY PRN
Status: DISCONTINUED | OUTPATIENT
Start: 2023-01-05 | End: 2023-01-06 | Stop reason: HOSPADM

## 2023-01-05 RX ORDER — LISINOPRIL 20 MG/1
20 TABLET ORAL DAILY
Status: DISCONTINUED | OUTPATIENT
Start: 2023-01-05 | End: 2023-01-06 | Stop reason: HOSPADM

## 2023-01-05 RX ORDER — ASPIRIN 81 MG/1
81 TABLET ORAL DAILY
Status: DISCONTINUED | OUTPATIENT
Start: 2023-01-05 | End: 2023-01-06 | Stop reason: HOSPADM

## 2023-01-05 RX ORDER — ALBUTEROL SULFATE 90 UG/1
2 AEROSOL, METERED RESPIRATORY (INHALATION) 2 TIMES DAILY PRN
Status: DISCONTINUED | OUTPATIENT
Start: 2023-01-05 | End: 2023-01-06 | Stop reason: HOSPADM

## 2023-01-05 RX ORDER — ATORVASTATIN CALCIUM 10 MG/1
10 TABLET, FILM COATED ORAL EVERY EVENING
Status: DISCONTINUED | OUTPATIENT
Start: 2023-01-05 | End: 2023-01-06 | Stop reason: HOSPADM

## 2023-01-05 RX ORDER — TRAZODONE HYDROCHLORIDE 100 MG/1
300 TABLET ORAL AT BEDTIME
Status: DISCONTINUED | OUTPATIENT
Start: 2023-01-05 | End: 2023-01-06 | Stop reason: HOSPADM

## 2023-01-05 RX ORDER — ESCITALOPRAM OXALATE 20 MG/1
20 TABLET ORAL EVERY EVENING
Status: DISCONTINUED | OUTPATIENT
Start: 2023-01-05 | End: 2023-01-06 | Stop reason: HOSPADM

## 2023-01-05 RX ORDER — LAMOTRIGINE 150 MG/1
300 TABLET ORAL EVERY EVENING
Status: DISCONTINUED | OUTPATIENT
Start: 2023-01-05 | End: 2023-01-06 | Stop reason: HOSPADM

## 2023-01-05 RX ORDER — MECLIZINE HCL 12.5 MG 12.5 MG/1
12.5 TABLET ORAL 3 TIMES DAILY PRN
Qty: 30 TABLET | Refills: 1 | Status: SHIPPED | OUTPATIENT
Start: 2023-01-05 | End: 2024-06-28

## 2023-01-05 RX ORDER — ARIPIPRAZOLE 10 MG/1
20 TABLET ORAL EVERY EVENING
Status: DISCONTINUED | OUTPATIENT
Start: 2023-01-05 | End: 2023-01-06 | Stop reason: HOSPADM

## 2023-01-05 RX ORDER — CLOPIDOGREL BISULFATE 75 MG/1
75 TABLET ORAL DAILY
Status: DISCONTINUED | OUTPATIENT
Start: 2023-01-05 | End: 2023-01-06 | Stop reason: HOSPADM

## 2023-01-05 RX ADMIN — GABAPENTIN 1200 MG: 300 CAPSULE ORAL at 09:56

## 2023-01-05 RX ADMIN — Medication 81 MG: at 09:56

## 2023-01-05 RX ADMIN — GABAPENTIN 1200 MG: 300 CAPSULE ORAL at 20:58

## 2023-01-05 RX ADMIN — ARIPIPRAZOLE 20 MG: 10 TABLET ORAL at 20:59

## 2023-01-05 RX ADMIN — INSULIN GLARGINE 35 UNITS: 100 INJECTION, SOLUTION SUBCUTANEOUS at 12:53

## 2023-01-05 RX ADMIN — ATORVASTATIN CALCIUM 10 MG: 10 TABLET, FILM COATED ORAL at 20:59

## 2023-01-05 RX ADMIN — ESCITALOPRAM OXALATE 20 MG: 20 TABLET ORAL at 20:58

## 2023-01-05 RX ADMIN — MONTELUKAST 10 MG: 10 TABLET, FILM COATED ORAL at 20:59

## 2023-01-05 RX ADMIN — BRIMONIDINE TARTRATE, TIMOLOL MALEATE 1 DROP: 2; 5 SOLUTION/ DROPS OPHTHALMIC at 09:57

## 2023-01-05 RX ADMIN — VENLAFAXINE HYDROCHLORIDE 150 MG: 150 CAPSULE, EXTENDED RELEASE ORAL at 09:57

## 2023-01-05 RX ADMIN — LIRAGLUTIDE 1.2 MG: 6 INJECTION SUBCUTANEOUS at 09:57

## 2023-01-05 RX ADMIN — OLANZAPINE 10 MG: 10 TABLET, FILM COATED ORAL at 21:00

## 2023-01-05 RX ADMIN — TRAZODONE HYDROCHLORIDE 300 MG: 100 TABLET ORAL at 21:00

## 2023-01-05 RX ADMIN — CLOPIDOGREL BISULFATE 75 MG: 75 TABLET ORAL at 09:56

## 2023-01-05 RX ADMIN — LAMOTRIGINE 300 MG: 150 TABLET ORAL at 21:00

## 2023-01-05 RX ADMIN — LISINOPRIL 20 MG: 20 TABLET ORAL at 09:56

## 2023-01-05 RX ADMIN — BRIMONIDINE TARTRATE, TIMOLOL MALEATE 1 DROP: 2; 5 SOLUTION/ DROPS OPHTHALMIC at 21:02

## 2023-01-05 ASSESSMENT — COLUMBIA-SUICIDE SEVERITY RATING SCALE - C-SSRS
2. HAVE YOU ACTUALLY HAD ANY THOUGHTS OF KILLING YOURSELF IN THE PAST MONTH?: NO
6. HAVE YOU EVER DONE ANYTHING, STARTED TO DO ANYTHING, OR PREPARED TO DO ANYTHING TO END YOUR LIFE?: YES
3. HAVE YOU BEEN THINKING ABOUT HOW YOU MIGHT KILL YOURSELF?: NO
1. IN THE PAST MONTH, HAVE YOU WISHED YOU WERE DEAD OR WISHED YOU COULD GO TO SLEEP AND NOT WAKE UP?: YES
BASED ON RESPONSES TO C-SSRS QS 1-6, WHAT IS THE PATIENT'S OVERALL RISK RATING FOR SUICIDE: MODERATE RISK
5. HAVE YOU STARTED TO WORK OUT OR WORKED OUT THE DETAILS OF HOW TO KILL YOURSELF? DO YOU INTEND TO CARRY OUT THIS PLAN?: NO
4. HAVE YOU HAD THESE THOUGHTS AND HAD SOME INTENTION OF ACTING ON THEM?: NO

## 2023-01-05 ASSESSMENT — ACTIVITIES OF DAILY LIVING (ADL)
ADLS_ACUITY_SCORE: 39
ADLS_ACUITY_SCORE: 43
ADLS_ACUITY_SCORE: 43
ADLS_ACUITY_SCORE: 39
ADLS_ACUITY_SCORE: 39
ADLS_ACUITY_SCORE: 43
ADLS_ACUITY_SCORE: 43
ADLS_ACUITY_SCORE: 39
ADLS_ACUITY_SCORE: 43
ADLS_ACUITY_SCORE: 43
ADLS_ACUITY_SCORE: 39
ADLS_ACUITY_SCORE: 39

## 2023-01-05 NOTE — PROGRESS NOTES
[facility]    Hospitalist Progress Note    Assessment & Plan   72 year old male who was admitted on 1/4/2023 with dizziness.     Impression:   Principal Problem:    Dysequilibrium  Active Problems:    Recurrent major depressive disorder (H)    Anxiety    Hx of TIA (transient ischemic attack)    Essential hypertension    Bipolar 1 disorder (H)      Plan:  repeatly discussed with patient, wife, and PT -- patient wanting to go home and did get a walker for him -- then wife finally stated she will not take him home because she doesn't feel she could help him up if he would fall.      DVT Prophylaxis: Pneumatic Compression Devices  Code Status: Full Code    Disposition: Expected discharge possibly home tomorrow if better, or look for TCU    Clifton Nuno MD  Pager [phone]  Cell Phone [phone]  Text Page (7am to 6pm)    Interval History   Reports sensation of dizziness for the last day, and has not had this before.     Physical Exam   Temp: 97.9  F (36.6  C) Temp src: Oral BP: (!) 143/70 Pulse: 72   Resp: 20 SpO2: 95 % O2 Device: None (Room air)    Vitals:    01/04/23 1311   Weight: 88.5 kg (195 lb)     Vital Signs with Ranges  Temp:  [97.7  F (36.5  C)-98.4  F (36.9  C)] 97.9  F (36.6  C)  Pulse:  [64-80] 72  Resp:  [16-20] 20  BP: (102-170)/(64-77) 143/70  SpO2:  [92 %-95 %] 95 %  I/O last 3 completed shifts:  In: 924 [P.O.:240; I.V.:684]  Out: 350 [Urine:350]    # Pain Assessment:  Current Pain Score 1/5/2023   Patient currently in pain? denies   Montez s pain level was assessed and he currently denies pain.        Constitutional: Awake, alert, cooperative, no apparent distress  Respiratory: Clear to auscultation bilaterally, no crackles or wheezing  Cardiovascular: Regular rate and rhythm, normal S1 and S2, and no murmur noted  GI: Normal bowel sounds, soft, non-distended, non-tender  Extrem: No calf tenderness, no ankle edema  Neuro: Ox3, no focal motor or sensory deficits,  and can walk with a walk but does admit he feels unsteady (but observed him getting up to bathroom by himself with walker)  Medications       ARIPiprazole  20 mg Oral QPM     aspirin  81 mg Oral Daily     atorvastatin  10 mg Oral QPM     brimonidine-timolol  1 drop Both Eyes BID     clopidogrel  75 mg Oral Daily     escitalopram  20 mg Oral QPM     gabapentin  1,200 mg Oral BID     insulin aspart  1-7 Units Subcutaneous TID AC     insulin aspart  1-5 Units Subcutaneous At Bedtime     insulin glargine  35 Units Subcutaneous QAM     lamoTRIgine  300 mg Oral QPM     liraglutide  1.2 mg Subcutaneous Daily     lisinopril  20 mg Oral Daily     montelukast  10 mg Oral At Bedtime     OLANZapine  10 mg Oral At Bedtime     traZODone  300 mg Oral At Bedtime     venlafaxine  150 mg Oral Daily with breakfast       Data   Recent Labs   Lab 01/05/23  1257 01/05/23  0856 01/05/23  0745 01/04/23  2111 01/04/23  1650 01/04/23  1346 01/01/23  0746 01/01/23  0655 12/31/22  0808 12/31/22  0754 12/31/22  0011 12/30/22  1935   WBC  --   --   --   --   --  11.2*  --  9.7  --  8.1  --   --    HGB  --   --   --   --   --  13.0*  --  11.4*  --  10.5*  --   --    MCV  --   --   --   --   --  100  --  101*  --  102*  --   --    PLT  --   --   --   --   --  401  --  304  --  265  --   --    INR  --   --   --   --  1.08  --   --   --   --   --   --  0.93   NA  --   --  139  --  138 137   < > 139  --  137  --   --    POTASSIUM  --   --  4.7  --  5.1 5.7*   < > 5.2  --  4.8  --   --    CHLORIDE  --   --  107  --  104 101   < > 101  --  102  --   --    CO2  --   --  22  --  22 25   < > 30*  --  27  --   --    BUN  --   --  41.5*  --  55.6* 56.3*   < > 17.8  --  20.1  --   --    CR  --   --  1.34*  --  2.12* 2.24*   < > 1.12  --  1.22*  --   --    ANIONGAP  --   --  10  --  12 11   < > 8  --  8  --   --    JAYME  --   --  8.6*  --  8.7* 9.3   < > 9.4  --  8.8  --   --    * 132* 123*   < > 173* 275*   < > 191*   < > 56*   < >  --    ALBUMIN  --    --   --   --   --  4.4  --   --   --   --   --   --    PROTTOTAL  --   --   --   --   --  7.7  --   --   --   --   --   --    BILITOTAL  --   --   --   --   --  0.2  --   --   --   --   --   --    ALKPHOS  --   --   --   --   --  106  --   --   --   --   --   --    ALT  --   --   --   --   --  22  --   --   --   --   --   --    AST  --   --   --   --   --  22  --   --   --   --   --   --     < > = values in this interval not displayed.       Imaging:   Recent Results (from the past 24 hour(s))   XR Thoracic Spine 2 Views    Narrative    EXAM: XR THORACIC SPINE 2 VIEWS, XR LUMBAR SPINE 2/3 VIEWS  LOCATION: [facility]  DATE/TIME: 1/4/2023 8:16 PM    INDICATION: Low back pain  COMPARISON: None  TECHNIQUE:   CR Thoracic Spine.  CR Lumbar Spine.      Impression    IMPRESSION:   THORACIC SPINE: Normal vertebral body heights and alignment. Diffuse flowing osteophytes consistent with DISH. No plain film evidence of an acute fracture.     LUMBAR SPINE: Normal vertebral body heights. Straightened lordosis. Severe L4-L5 interbody degeneration, more pronounced than on the prior study. Normal facets. No spinal canal or neural foraminal stenosis.    CONCLUSION:  1.  Flowing thoracic marginal osteophytes consistent with DISH.  2.  No plain film evidence of a fracture.  3.  Progressed L4-L5 interbody degeneration.  4.  Fractures in patient with DISH are not always visible on plain film. If there is persistent clinical concern for fracture, further assessment with CT is recommended.   XR Lumbar Spine 2/3 Views    Narrative    EXAM: XR THORACIC SPINE 2 VIEWS, XR LUMBAR SPINE 2/3 VIEWS  LOCATION: [facility]  DATE/TIME: 1/4/2023 8:16 PM    INDICATION: Low back pain  COMPARISON: None  TECHNIQUE:   CR Thoracic Spine.  CR Lumbar Spine.      Impression    IMPRESSION:   THORACIC SPINE: Normal vertebral body heights and alignment. Diffuse flowing osteophytes consistent with DISH. No plain  film evidence of an acute fracture.     LUMBAR SPINE: Normal vertebral body heights. Straightened lordosis. Severe L4-L5 interbody degeneration, more pronounced than on the prior study. Normal facets. No spinal canal or neural foraminal stenosis.    CONCLUSION:  1.  Flowing thoracic marginal osteophytes consistent with DISH.  2.  No plain film evidence of a fracture.  3.  Progressed L4-L5 interbody degeneration.  4.  Fractures in patient with DISH are not always visible on plain film. If there is persistent clinical concern for fracture, further assessment with CT is recommended.

## 2023-01-05 NOTE — CONSULTS
Care Management Initial Consult    General Information  Assessment completed with: Patient, Montez       Primary Care Provider verified and updated as needed:   yes  Readmission within the last 30 days:        Reason for Consult: discharge planning  Advance Care Planning: Advance Care Planning Reviewed: no concerns identified          Communication Assessment  Patient's communication style: spoken language (English or Bilingual)    Hearing Difficulty or Deaf: no        Cognitive  Cognitive/Neuro/Behavioral: WDL                 Speech: clear    Living Environment:   People in home: spouse, other (see comments)     Current living Arrangements: independent living facility      Able to return to prior arrangements: yes       Family/Social Support:  Care provided by: self  Provides care for: no one  Marital Status:   Wife          Description of Support System: Supportive, Involved         Current Resources:   Patient receiving home care services:       Community Resources:    Equipment currently used at home: none (owns FWW, but does not normally use)  Supplies currently used at home:      Employment/Financial:  Employment Status: retired        Financial Concerns:   none          Lifestyle & Psychosocial Needs:  Social Determinants of Health     Tobacco Use: Medium Risk     Smoking Tobacco Use: Former     Smokeless Tobacco Use: Never     Passive Exposure: Not on file   Alcohol Use: Not on file   Financial Resource Strain: Not on file   Food Insecurity: Not on file   Transportation Needs: Not on file   Physical Activity: Not on file   Stress: Not on file   Social Connections: Not on file   Intimate Partner Violence: Not on file   Depression: Not on file   Housing Stability: Not on file       Functional Status:  Prior to admission patient needed assistance: Pt lives in senior building with wife. Independent              Mental Health Status:NA          Chemical Dependency Status:NA                 Values/Beliefs:  Spiritual, Cultural Beliefs, Mormonism Practices, Values that affect care:    no             Additional Information:  SW met with pt and pt wife. OLVERA notice explained and signed. Pt has seen PT. Recommendations are TCU for strengthening.. Pt and wife agreeable and able to pay for private pay rooms. Pt provided choices. Referrals sent.     ROSS Workman

## 2023-01-05 NOTE — PLAN OF CARE
PRIMARY DIAGNOSIS: SYNCOPE/TIA  OUTPATIENT/OBSERVATION GOALS TO BE MET BEFORE DISCHARGE:  1. Orthostatic performed: Yes:          Lying Orthostatic BP: 170/77         Sitting Orthostatic BP: 169/86         Standing Orthostatic BP: 157/74     2. Diagnostic testing complete & at baseline neurologic testing: Yes    3. Cleared by consultants (if involved): Yes    4. Interpretation of cardiac rhythm per telemetry tech: Normal Sinus Rhythm    5. Tolerating adequate PO diet and medications: Yes    6. Return to near baseline physical activity or neurologic status: No    Discharge Planner Nurse   Safe discharge environment identified: Yes  Barriers to discharge: Yes       Entered by: Sofía Leonard RN 01/05/2023 4:53 AM     Please review provider order for any additional goals.   Nurse to notify provider when observation goals have been met and patient is ready for discharge.Goal Outcome Evaluation:    No slurring. Endorses dizziness when standing up. Able to ambulate to the bathroom with assist of 1 using a walker. Denies pain. Iv fluids running. Urine sample sent for UA and resulted.

## 2023-01-05 NOTE — PROGRESS NOTES
01/05/23 0835   Appointment Info   Signing Clinician's Name / Credentials (PT) Gabrielle Moise DPT   Quick Adds   Quick Adds Certification   Living Environment   People in Home spouse   Current Living Arrangements independent living facility   Home Accessibility no concerns   Transportation Anticipated family or friend will provide;health plan transportation   Self-Care   Usual Activity Tolerance good   Current Activity Tolerance fair   Equipment Currently Used at Home none  (owns FWW, but does not normally use)   Fall history within last six months yes   Number of times patient has fallen within last six months 3   General Information   Onset of Illness/Injury or Date of Surgery 01/04/23   Referring Physician Clifton Crockett MD   Patient/Family Therapy Goals Statement (PT) none   Pertinent History of Current Problem (include personal factors and/or comorbidities that impact the POC) 72 year old male: Acute onset Dizziness, Slurred speech. Recent hospitalization at [facility] with TIA.   Existing Precautions/Restrictions fall   Strength (Manual Muscle Testing)   Strength (Manual Muscle Testing) strength is WFL   Bed Mobility   Bed Mobility supine-sit   Supine-Sit Sanders (Bed Mobility) supervision;verbal cues   Transfers   Transfers sit-stand transfer   Sit-Stand Transfer   Sit-Stand Sanders (Transfers) contact guard;verbal cues   Assistive Device (Sit-Stand Transfers) walker, front-wheeled   Gait/Stairs (Locomotion)   Sanders Level (Gait) contact guard;minimum assist (75% patient effort)   Assistive Device (Gait) walker, front-wheeled   Distance in Feet 15'   Pattern (Gait) step-through   Deviations/Abnormal Patterns (Gait) gait speed decreased   Clinical Impression   Criteria for Skilled Therapeutic Intervention Yes, treatment indicated   PT Diagnosis (PT) Impaired functional mobility   Influenced by the following impairments Dizziness, balance defecits   Functional limitations due to  impairments Impaired transfers, gait   Clinical Presentation (PT Evaluation Complexity) Stable/Uncomplicated   Clinical Presentation Rationale Presents as diagnosed   Clinical Decision Making (Complexity) moderate complexity   Planned Therapy Interventions (PT) balance training;bed mobility training;gait training;transfer training   Anticipated Equipment Needs at Discharge (PT) walker, rolling   Risk & Benefits of therapy have been explained patient   PT Total Evaluation Time   PT Eval, Moderate Complexity Minutes (27578) 10   Therapy Certification   Start of care date 01/05/23   Certification date from 01/05/23   Certification date to 01/12/23   Medical Diagnosis TIA   Physical Therapy Goals   PT Frequency Daily   PT Predicted Duration/Target Date for Goal Attainment 01/12/23   PT: Bed Mobility Independent;Supine to/from sit   PT: Transfers Modified independent;Sit to/from stand;Bed to/from chair;Assistive device   PT: Gait Supervision/stand-by assist;Rolling walker;Greater than 200 feet   PT Discharge Planning   PT Plan progress transfers, amb as carlos, balance ex   PT Discharge Recommendation (DC Rec) Transitional Care Facility   PT Rationale for DC Rec Pt req min A for ambulation due to dizziness. TCU may be best option. If to return home, recommend Ax1 with mobility due to falls risk.   PT Brief overview of current status Amb 60' with min A and FWW.   Total Session Time   Total Session Time (sum of timed and untimed services) 10       [facility]  OUTPATIENT PHYSICAL THERAPY EVALUATION  PLAN OF TREATMENT FOR OUTPATIENT REHABILITATION  (COMPLETE FOR INITIAL CLAIMS ONLY)  Patient's Last Name, First Name, M.I.  YOB: 1950  Montez Cabrera                        Provider's Name  [facility] Medical Record No.  [id number]                             Onset Date:  01/04/23   Start of Care Date:  01/05/23   Type:     _X_PT   ___OT   ___SLP  Medical Diagnosis:  TIA              PT Diagnosis:  Impaired functional mobility Visits from SOC:  1     See note for plan of treatment, functional goals and certification details    I CERTIFY THE NEED FOR THESE SERVICES FURNISHED UNDER        THIS PLAN OF TREATMENT AND WHILE UNDER MY CARE     (Physician co-signature of this document indicates review and certification of the therapy plan).                Gabrielle Moise, PT, DPT  1/5/2023

## 2023-01-05 NOTE — PLAN OF CARE
"PRIMARY DIAGNOSIS: \"GENERIC\" NURSING  OUTPATIENT/OBSERVATION GOALS TO BE MET BEFORE DISCHARGE:  ADLs back to baseline: Yes    Activity and level of assistance: Up with standby assistance.    Pain status: Pain free.    Return to near baseline physical activity: Yes     Discharge Planner Nurse   Safe discharge environment identified: No  Barriers to discharge: Yes       Entered by: Humera Muniz RN 01/05/2023 4:30 PM     Please review provider order for any additional goals.   Nurse to notify provider when observation goals have been met and patient is ready for discharge.Goal Outcome Evaluation:                        "

## 2023-01-05 NOTE — ED NOTES
"[facility] ED Handoff Report    ED Chief Complaint: Dizziness     ED Diagnosis:  (G45.9) TIA (transient ischemic attack)  Comment:  Plan:     (R42) Dizziness  Comment: started yesterday, patient reports with movement he feels like the room is spinning   Plan:     (R47.81) Slurred speech  Comment: Wife reports his speech is a little slurred compared to his baseline   Plan:     (N17.9) Acute kidney injury (H)  Comment:   Plan:        PMH:    Past Medical History:   Diagnosis Date    Anxiety 04/24/2020    Asthma exacerbation 09/11/2017    Bipolar I disorder (H) 04/24/2020    Controlled type 2 diabetes mellitus, without long-term current use of insulin (H) 09/11/2017    Mixed hyperlipidemia     Recurrent major depressive disorder (H) 09/11/2017    Respiratory insufficiency 09/11/2017        Code Status:  Prior     Falls Risk: Yes Band: Applied    Current Living Situation/Residence: lives with a significant other     Elimination Status: Continent: Yes     Activity Level: 2 assist    Patients Preferred Language:  English     Needed: No    Vital Signs:  /67   Pulse 64   Temp 98.1  F (36.7  C) (Oral)   Resp 16   Ht 1.626 m (5' 4\")   Wt 88.5 kg (195 lb)   SpO2 95%   BMI 33.47 kg/m           Pain Score: 0/10    Is the Patient Confused:  Yes    Last Food or Drink: 01/04/23 at 1000    Focused Assessment:  Neuro, patient is alert and orientated x4, currently denies any pain.     Tests Performed: Done: Labs and Imaging    Treatments Provided:  meds     Family Dynamics/Concerns: No    Family Updated On Visitor Policy: Yes    Plan of Care Communicated to Family: Yes    Who Was Updated about Plan of Care: Patient and his wife Meghan       Medications sent with patient: No     Covid: asymptomatic , negative      RN: Janette Sykes RN 1/4/2023 6:04 PM       "

## 2023-01-05 NOTE — PLAN OF CARE
Goal Outcome Evaluation:           Problem: Plan of Care - These are the overarching goals to be used throughout the patient stay.    Goal: Plan of Care Review  Description: The Plan of Care Review/Shift note should be completed every shift.  The Outcome Evaluation is a brief statement about your assessment that the patient is improving, declining, or no change.  This information will be displayed automatically on your shift note.  Outcome: Progressing       The patient was alert and oriented x4. Neuro's remained intact. The patient denied pain. Vitals remained within parameters. Passed onto next shift that urine sample was needed.

## 2023-01-05 NOTE — CONSULTS
NEUROLOGY INPATIENT CONSULTATION NOTE       [facility] NEUROLOGY  [email or web address]     Montez Moser,  1950, MRN [id number]  PCP: Lori Cerrato  Date: 2023     ASSESSMENT & PLAN     Diagnosis code: Recurrent vertigo, possible TIA    The patient was admitted just a few days ago for a possible TIA work-up.  Even at that time the symptoms were not classic for TIA onset.  I would continue dual antiplatelet therapy and previous recommendations from that hospital stay.  I do think he needs cardiac monitoring with a 30-day event monitor upon discharge.  If atrial fibrillation is present anticoagulation needs to be considered.  Please see my last note from hospital stay for recommendations on hemoglobin A1c, LDL and blood pressure goals.    In light of the positional component of the vertigo he is now reporting I would request that physical therapy perform Phoenix-Hallpike and possibly Epley maneuvers to see if this resolves some of his symptoms.    Thank you again for this referral, please feel free to contact me if you have any questions.    I spent 50 minutes caring for the patient today which included chart review, visit time and documentation time.    Belinda Peñaloza MD   of Neurology  [facility]  Pager: [phone]     CHIEF COMPLAINT Dysequilibrium     HISTORY OF PRESENT ILLNESS     Neurology has been requested by Dr. Germain to evaluate Montez Moser who is a 72 year old  male for TIA/dizziness    Mr. Moser is a 72-year-old gentleman who I met on 2022 during his most recent hospitalization.  The patient reported an episode where he was sitting at a bar drinking a Coke when he had sudden onset malaise.  He describes feeling overall weak and having to rest his head on the bar.  At that time he denied vertigo and just reported he felt really tired and unsteady.  He was initially admitted with question of TIA.  MRI was negative he was  discharged on dual antiplatelet therapy with a recommendation for cardiac monitor.    The patient reports that he had a repeat episode yesterday January 4 prompting him to return to the emergency department.  He reports that it is exactly like the first of all he had but then goes on to describe a feeling of vertigo.  He reports that he stood up to cross the room and had to hold onto the counter because he felt he would fall over.  He describes a sensation as movement.  He had to wait several moments before it passed because he thought he might fall over.  He denies tunneling of vision or diaphoresis or anything to suggest that he might of lost consciousness.  He reports that he feels okay today but if he turns his head suddenly to the left he feels dizzy again.  Again these are symptoms he denied on December 31 but reports them today and then goes on to report that he feels the 2 episodes were similar.  With both of them I believe people have observed some slurred speech.    At baseline he report reports again he has chronic back pain that has been present for several years.  He also reports he has chronic diplopia that has been associated with the same timeframe as the back pain and he correlates it to an injury he had where he slipped and fell.     PROBLEM LIST      Patient Active Problem List   Diagnosis Code     Respiratory insufficiency R06.89     Controlled type 2 diabetes mellitus, without long-term current use of insulin (H) E11.9     Recurrent major depressive disorder (H) F33.9     Asthma J45.909     Depression F32.A     Anxiety F41.9     Bipolar I disorder (H) F31.9     Other insomnia G47.09     Hx of TIA (transient ischemic attack) G45.9     Disorder of stomach K31.9     Gastritis K29.70     Essential hypertension I10     Hyperlipidemia E78.5     Indigestion K30     Moderate persistent asthma without complication J45.40     Primary localized osteoarthritis of pelvic region and thigh M16.10     Vitamin  "B12 deficiency (non anemic) E53.8     Bipolar 1 disorder (H) F31.9     Mixed anxiety and depressive disorder F41.8     Insomnia G47.00     Dysequilibrium R42      Clinically Significant Risk Factors Present on Admission        # Hyperkalemia: Highest K = 5.7 mmol/L in last 2 days, will monitor as appropriate         # Drug Induced Platelet Defect: home medication list includes an antiplatelet medication   # Hypertension: home medication list includes antihypertensive(s)     # DMII: A1C = 7.2 % (Ref range: <5.7 %) within past 3 months    # Obesity: Estimated body mass index is 33.47 kg/m  as calculated from the following:    Height as of this encounter: 1.626 m (5' 4\").    Weight as of this encounter: 88.5 kg (195 lb).        # CKD, Stage 3 (unspecified if a or b) (GFR 30 - 59): Will monitor and treat as appropriate  - last Cr =  1.34 mg/dL (Ref range: 0.67 - 1.17 mg/dL)  - last GFR = 56 mL/min/1.73m2 (Ref range: >60 mL/min/1.73m2)           PAST MEDICAL & SURGICAL HISTORY     Past Medical History: Patient  has a past medical history of Anxiety (04/24/2020), Asthma exacerbation (09/11/2017), Bipolar I disorder (H) (04/24/2020), Controlled type 2 diabetes mellitus, without long-term current use of insulin (H) (09/11/2017), Mixed hyperlipidemia, Recurrent major depressive disorder (H) (09/11/2017), and Respiratory insufficiency (09/11/2017).    Past Surgical History: He  has a past surgical history that includes Foot surgery and other surgical history.     SOCIAL HISTORY     Reviewed, and he  reports that he quit smoking about 45 years ago. He has never used smokeless tobacco. He reports that he does not drink alcohol and does not use drugs.     FAMILY HISTORY     Reviewed, and family history is not on file.     ALLERGIES     Allergies   Allergen Reactions     Bupropion Diarrhea     Erythromycin Diarrhea     Theophylline GI Disturbance        REVIEW OF SYSTEMS     See HPI     HOME & HOSPITAL MEDICATIONS     Prior to " Admission Medications  Medications Prior to Admission   Medication Sig Dispense Refill Last Dose     albuterol (PROAIR HFA/PROVENTIL HFA/VENTOLIN HFA) 108 (90 Base) MCG/ACT inhaler Inhale 2 puffs into the lungs 2 times daily as needed for shortness of breath / dyspnea or wheezing   Past Month     ARIPiprazole (ABILIFY) 20 MG tablet Take 20 mg by mouth every evening   1/3/2023 at pm     atorvastatin (LIPITOR) 10 MG tablet Take 10 mg by mouth every evening   1/3/2023 at pm     brimonidine-timolol (COMBIGAN) 0.2-0.5 % ophthalmic solution Place 1 drop into both eyes 2 times daily   1/4/2023 at am     clopidogrel (PLAVIX) 75 MG tablet Take 1 tablet (75 mg) by mouth daily for 30 days 30 tablet 0 1/4/2023 at am     escitalopram (LEXAPRO) 20 MG tablet Take 20 mg by mouth every evening   1/3/2023 at pm     gabapentin (NEURONTIN) 400 MG capsule Take 1,200 mg by mouth 2 times daily   1/4/2023 at am     insulin glargine (LANTUS PEN) 100 UNIT/ML pen Inject 35 Units Subcutaneous At Bedtime for 30 days (Patient taking differently: Inject 35 Units Subcutaneous every morning) 15 mL 0 1/4/2023 at am     lamoTRIgine (LAMICTAL) 100 MG tablet Take 300 mg by mouth every evening   1/3/2023 at pm     liraglutide (VICTOZA) 18 MG/3ML solution Inject 1.2 mg Subcutaneous daily   1/3/2023 at pm     lisinopril (ZESTRIL) 10 MG tablet Take 20 mg by mouth daily   1/4/2023 at am     metFORMIN (GLUCOPHAGE-XR) 500 MG 24 hr tablet Take 1,000 mg by mouth 2 times daily (with meals)   1/4/2023 at am     montelukast (SINGULAIR) 10 MG tablet Take 10 mg by mouth At Bedtime   1/3/2023 at hs     OLANZapine (ZYPREXA) 10 MG tablet Take 10 mg by mouth At Bedtime   1/3/2023 at hs     traZODone (DESYREL) 150 MG tablet Take 300 mg by mouth At Bedtime   1/3/2023 at hs     venlafaxine (EFFEXOR-XR) 75 MG 24 hr capsule Take 150 mg by mouth in the morning and 75 mg at night   1/4/2023 at am     [DISCONTINUED] aspirin (ASA) 325 MG EC tablet Take 1 tablet (325 mg) by mouth  daily for 30 days (Patient taking differently: Take 81 mg by mouth daily) 30 tablet 0 1/4/2023 at am       Hospital Medications    ARIPiprazole  20 mg Oral QPM     aspirin  81 mg Oral Daily     atorvastatin  10 mg Oral QPM     brimonidine-timolol  1 drop Both Eyes BID     clopidogrel  75 mg Oral Daily     escitalopram  20 mg Oral QPM     gabapentin  1,200 mg Oral BID     insulin aspart  1-7 Units Subcutaneous TID AC     insulin aspart  1-5 Units Subcutaneous At Bedtime     insulin glargine  35 Units Subcutaneous QAM     lamoTRIgine  300 mg Oral QPM     liraglutide  1.2 mg Subcutaneous Daily     lisinopril  20 mg Oral Daily     montelukast  10 mg Oral At Bedtime     OLANZapine  10 mg Oral At Bedtime     traZODone  300 mg Oral At Bedtime     venlafaxine  150 mg Oral Daily with breakfast        PHYSICAL EXAM     Vital signs  Temp:  [97.7  F (36.5  C)-98.4  F (36.9  C)] 97.9  F (36.6  C)  Pulse:  [70-80] 72  Resp:  [18-20] 20  BP: (132-170)/(64-77) 143/70  SpO2:  [92 %-95 %] 95 %    Weight:   Wt Readings from Last 1 Encounters:   01/04/23 88.5 kg (195 lb)        General Physical Exam:   The patient is sitting in the chair in no acute distress  Neurological Exam:  Mental status and speech: Awake and alert.  Oriented.  No attention deficits.  No dysarthria.  No aphasia.  Cranial nerves: 2 through 12 intact except for some nystagmus when the patient turns to look to the left.  He also reports some symptomatic feelings of dizziness that are mild.  The nystagmus with keeping his head straight and looking to the left extinguishes after few seconds.  He also reports diplopia when looking to the left and looking to the right as well as looking down but not looking up.  He reports that this is actually a longstanding problem for him.  Motor: Normal muscle bulk, tone and strength.  Reflexes: Hyporeflexic in the lower extremities.  Plantar responses are flexor  Sensory: Light touch normal in the lower extremities.  Coordination:  Finger taps, finger-to-nose and heel-knee-shin are normal  Gait: Deferred by patient     DIAGNOSTIC STUDIES     Pertinent Radiology   Radiology Results: Reviewed impression and images       MRI brain  No acute stroke seen.  MR angiography of the [place] normal    Pertinent Labs   Lab Results: Personally Reviewed   Recent Results (from the past 24 hour(s))   Glucose by meter    Collection Time: 01/04/23  9:11 PM   Result Value Ref Range    GLUCOSE BY METER POCT 115 (H) 70 - 99 mg/dL   UA with Microscopic reflex to Culture    Collection Time: 01/05/23 12:17 AM    Specimen: Urine, Clean Catch   Result Value Ref Range    Color Urine Light Yellow Colorless, Straw, Light Yellow, Yellow    Appearance Urine Clear Clear    Glucose Urine Negative Negative mg/dL    Bilirubin Urine Negative Negative    Ketones Urine Negative Negative mg/dL    Specific Gravity Urine 1.022 1.001 - 1.030    Blood Urine Negative Negative    pH Urine 5.0 5.0 - 7.0    Protein Albumin Urine 10 (A) Negative mg/dL    Urobilinogen Urine <2.0 <2.0 mg/dL    Nitrite Urine Negative Negative    Leukocyte Esterase Urine Negative Negative    Mucus Urine Present (A) None Seen /LPF    RBC Urine 6 (H) <=2 /HPF    WBC Urine 4 <=5 /HPF    Hyaline Casts Urine 3 (H) <=2 /LPF   Basic metabolic panel    Collection Time: 01/05/23  7:45 AM   Result Value Ref Range    Sodium 139 136 - 145 mmol/L    Potassium 4.7 3.4 - 5.3 mmol/L    Chloride 107 98 - 107 mmol/L    Carbon Dioxide (CO2) 22 22 - 29 mmol/L    Anion Gap 10 7 - 15 mmol/L    Urea Nitrogen 41.5 (H) 8.0 - 23.0 mg/dL    Creatinine 1.34 (H) 0.67 - 1.17 mg/dL    Calcium 8.6 (L) 8.8 - 10.2 mg/dL    Glucose 123 (H) 70 - 99 mg/dL    GFR Estimate 56 (L) >60 mL/min/1.73m2   Extra Purple Top Tube    Collection Time: 01/05/23  7:45 AM   Result Value Ref Range    Hold Specimen JIC    Glucose by meter    Collection Time: 01/05/23  8:56 AM   Result Value Ref Range    GLUCOSE BY METER POCT 132 (H) 70 - 99 mg/dL   Glucose  by meter    Collection Time: 01/05/23 12:57 PM   Result Value Ref Range    GLUCOSE BY METER POCT 196 (H) 70 - 99 mg/dL   Glucose by meter    Collection Time: 01/05/23  4:10 PM   Result Value Ref Range    GLUCOSE BY METER POCT 165 (H) 70 - 99 mg/dL   Glucose by meter    Collection Time: 01/05/23  4:49 PM   Result Value Ref Range    GLUCOSE BY METER POCT 171 (H) 70 - 99 mg/dL           This note was dictated using voice recognition software.  Any grammatical or context distortions are unintentional and inherent to the software.

## 2023-01-05 NOTE — PLAN OF CARE
PRIMARY DIAGNOSIS: SYNCOPE/TIA  OUTPATIENT/OBSERVATION GOALS TO BE MET BEFORE DISCHARGE:  1. Orthostatic performed: No    2. Diagnostic testing complete & at baseline neurologic testing: Yes    3. Cleared by consultants (if involved): No, Neurology consulted.     4. Interpretation of cardiac rhythm per telemetry tech: NSR    5. Tolerating adequate PO diet and medications: Yes    6. Return to near baseline physical activity or neurologic status: No, assist of 1-2 with a walker. Endorsed dizziness when standing.     Discharge Planner Nurse   Safe discharge environment identified: No  Barriers to discharge: Yes       Entered by: Sofía Leonard RN 01/05/2023 2:48 AM     Please review provider order for any additional goals.   Nurse to notify provider when observation goals have been met and patient is ready for discharge.Goal Outcome Evaluation:

## 2023-01-05 NOTE — PHARMACY-ADMISSION MEDICATION HISTORY
Pharmacy Note - Admission Medication History    Pertinent Provider Information: Spoke with wife- recently discharged 2 days ago.    Looks like he was supposed to start on 325 mg aspirin but is taking 81 mg aspirin      ______________________________________________________________________    Prior To Admission (PTA) med list completed and updated in EMR.       PTA Med List   Medication Sig Last Dose     albuterol (PROAIR HFA/PROVENTIL HFA/VENTOLIN HFA) 108 (90 Base) MCG/ACT inhaler Inhale 2 puffs into the lungs 2 times daily as needed for shortness of breath / dyspnea or wheezing Past Month     ARIPiprazole (ABILIFY) 20 MG tablet Take 20 mg by mouth every evening 1/3/2023 at pm     aspirin (ASA) 325 MG EC tablet Take 1 tablet (325 mg) by mouth daily for 30 days (Patient taking differently: Take 81 mg by mouth daily) 1/4/2023 at am     atorvastatin (LIPITOR) 10 MG tablet Take 10 mg by mouth every evening 1/3/2023 at pm     brimonidine-timolol (COMBIGAN) 0.2-0.5 % ophthalmic solution Place 1 drop into both eyes 2 times daily 1/4/2023 at am     clopidogrel (PLAVIX) 75 MG tablet Take 1 tablet (75 mg) by mouth daily for 30 days 1/4/2023 at am     escitalopram (LEXAPRO) 20 MG tablet Take 20 mg by mouth every evening 1/3/2023 at pm     gabapentin (NEURONTIN) 400 MG capsule Take 1,200 mg by mouth 2 times daily 1/4/2023 at am     insulin glargine (LANTUS PEN) 100 UNIT/ML pen Inject 35 Units Subcutaneous At Bedtime for 30 days (Patient taking differently: Inject 35 Units Subcutaneous every morning) 1/4/2023 at am     lamoTRIgine (LAMICTAL) 100 MG tablet Take 300 mg by mouth every evening 1/3/2023 at pm     liraglutide (VICTOZA) 18 MG/3ML solution Inject 1.2 mg Subcutaneous daily 1/3/2023 at pm     lisinopril (ZESTRIL) 10 MG tablet Take 20 mg by mouth daily 1/4/2023 at am     metFORMIN (GLUCOPHAGE-XR) 500 MG 24 hr tablet Take 1,000 mg by mouth 2 times daily (with meals) 1/4/2023 at am     montelukast (SINGULAIR) 10 MG tablet  Take 10 mg by mouth At Bedtime 1/3/2023 at hs     OLANZapine (ZYPREXA) 10 MG tablet Take 10 mg by mouth At Bedtime 1/3/2023 at hs     traZODone (DESYREL) 150 MG tablet Take 300 mg by mouth At Bedtime 1/3/2023 at hs     venlafaxine (EFFEXOR-XR) 75 MG 24 hr capsule Take 150 mg by mouth in the morning and 75 mg at night 1/4/2023 at am       Information source(s): Patient, Family member, Hospital records and [facility]  Method of interview communication: in-person    Summary of Changes to PTA Med List  New: -  Discontinued: -  Changed: aspirin     Patient was asked about OTC/herbal products specifically.  PTA med list reflects this.    In the past week, patient estimated taking medication this percent of the time:  greater than 90%.    Allergies were reviewed, assessed, and updated with the patient.      Patient did not bring any medications to the hospital and can't retrieve from home. No multi-dose medications are available for use during hospital stay.     The information provided in this note is only as accurate as the sources available at the time of the update(s).    Thank you for the opportunity to participate in the care of this patient.    SAMANTA HERRERA RPH  1/4/2023 6:03 PM

## 2023-01-05 NOTE — PLAN OF CARE
"PRIMARY DIAGNOSIS: \"GENERIC\" NURSING  OUTPATIENT/OBSERVATION GOALS TO BE MET BEFORE DISCHARGE:  ADLs back to baseline: Yes    Activity and level of assistance: Up with standby assistance.    Pain status: Pain free.    Return to near baseline physical activity: Yes     Discharge Planner Nurse   Safe discharge environment identified: No  Barriers to discharge: Yes       Entered by: Humera Muniz RN 01/05/2023 4:31 PM     Please review provider order for any additional goals.   Nurse to notify provider when observation goals have been met and patient is ready for discharge.Goal Outcome Evaluation: Awaiting placement-wife not comfortable taking patient home                        "

## 2023-01-05 NOTE — H&P
[facility]    History and Physical - Hospitalist Service       Date of Admission:  1/4/2023    Assessment & Plan      Montez Cabrera is a 72 year old male admitted on 1/4/2023.     Recently hospitalized at [facility] for dysarthria and concern for TIA/CVA. There was oncern for TIA given improvement in symptoms.  CT and MRI brain negative for acute stroke. Neurology recommended treatment for TIA: Start aspirin 3 and 25 mg daily, Plavix load and Plavix 75 mg daily thereafter    He has been admitted with dizziness that started yesterday. It feels like spinning around on standing up with some lightheadedness. No h/o black outs or syncope, no falls. He does have some slurred speech and weakness in legs - subjective ?, and has some chronic low backache, no bladder or urinary incontinence. Patient subsequently came to the ER for further evaluation and management. Stroke work up including MRI brain : negative for acute stroke. Will do Found to have ANAMARIA and started on iv hydration. Will do XR of thoracic/lumbar spine to evaluate backache. Will consult psychiatry as patient on multiple psych meds and may need dose adjustment.        A/p           Acute onset Dizziness + Slurred speech    Recent hospitalization at [facility] with TIA    Recently hospitalized at [facility] for dysarthria and concern for TIA/CVA. There was oncern for TIA given improvement in symptoms.  CT and MRI brain negative for acute stroke. Neurology recommended treatment for TIA: Started aspirin 325 mg daily, Plavix load and Plavix 75 mg daily thereafter    He has been admitted with dizziness that started yesterday. It feels like spinning around on standing up with some lightheadedness. No h/o black outs or syncope, no falls. He does have some slurred speech and weakness in legs - subjective ?, and has some chronic low backache, no bladder or urinary incontinence. Patient subsequently came to the ER for further evaluation and management. Stroke  "work up including MRI brain : negative for acute stroke. Will do Found to have ANAMARIA and started on iv hydration. Will do XR of thoracic/lumbar spine to evaluate backache. Will consult psychiatry as patient on multiple psych meds and may need dose adjustment.          Chronic low backache    Weakness in legs    Check XR lumbosacral spine  Consult neurology      HLD : on statin      Acute on CKD stage 3 : on iv hydration, monitor.        Asthma : on albuterol prn      Anxiety/Depression : on abilify, lexapro, gabapentin, lamictal, zyprexa, trazodone, effexor - at home, consult psych for med adjustment.      DM2 : on lantus 35 units in am, victoza, metformin - at home, on SSI      HTN, Essential ; on lisinopril - at home, hold for now.             Diet:   diabetic  DVT Prophylaxis: Pneumatic Compression Devices  Castillo Catheter: Not present  Lines: None     Cardiac Monitoring: None  Code Status:   full     Clinically Significant Risk Factors Present on Admission        # Hyperkalemia: Highest K = 5.7 mmol/L in last 2 days, will monitor as appropriate         # Drug Induced Platelet Defect: home medication list includes an antiplatelet medication  # Acute Kidney Injury, unspecified: based on a >150% or 0.3 mg/dL increase in last creatinine compared to past 90 day average, will monitor renal function  # Hypertension: home medication list includes antihypertensive(s)     # DMII: A1C = 7.2 % (Ref range: <5.7 %) within past 3 months    # Obesity: Estimated body mass index is 33.47 kg/m  as calculated from the following:    Height as of this encounter: 1.626 m (5' 4\").    Weight as of this encounter: 88.5 kg (195 lb).           Disposition Plan      Expected Discharge Date: 01/05/2023                  Je Germain MD  Hospitalist Service  [facility]  Securely message with [employer] (more info)  Text page via [employer] Paging/Directory "     ______________________________________________________________________    Chief Complaint   dizziness    History is obtained from the patient    History of Present Illness       Montez Cabrera is a 72 year old male admitted on 1/4/2023.     Recently hospitalized at [facility] for dysarthria and concern for TIA/CVA. There was oncern for TIA given improvement in symptoms.  CT and MRI brain negative for acute stroke. Neurology recommended treatment for TIA: Start aspirin 3 and 25 mg daily, Plavix load and Plavix 75 mg daily thereafter    He has been admitted with dizziness that started yesterday. It feels like spinning around on standing up with some lightheadedness. No h/o black outs or syncope, no falls. He does have some slurred speech and weakness in legs - subjective ?, and has some chronic low backache, no bladder or urinary incontinence. Patient subsequently came to the ER for further evaluation and management. Stroke work up including MRI brain : negative for acute stroke. Will do Found to have ANAMARIA and started on iv hydration. Will do XR of thoracic/lumbar spine to evaluate backache. Will consult psychiatry as patient on multiple psych meds and may need dose adjustment.        Past Medical History    Past Medical History:   Diagnosis Date     Anxiety 04/24/2020     Asthma exacerbation 09/11/2017     Bipolar I disorder (H) 04/24/2020     Controlled type 2 diabetes mellitus, without long-term current use of insulin (H) 09/11/2017     Mixed hyperlipidemia      Recurrent major depressive disorder (H) 09/11/2017     Respiratory insufficiency 09/11/2017     Lives In [place]    1 child  Denies smoking, alcohol      Past Surgical History   Past Surgical History:   Procedure Laterality Date     FOOT SURGERY       OTHER SURGICAL HISTORY      ARM SURGERY       Prior to Admission Medications   Prior to Admission Medications   Prescriptions Last Dose Informant Patient Reported? Taking?   ARIPiprazole (ABILIFY) 20  MG tablet 1/3/2023 at pm  Yes Yes   Sig: Take 20 mg by mouth every evening   OLANZapine (ZYPREXA) 10 MG tablet 1/3/2023 at hs  Yes Yes   Sig: Take 10 mg by mouth At Bedtime   albuterol (PROAIR HFA/PROVENTIL HFA/VENTOLIN HFA) 108 (90 Base) MCG/ACT inhaler Past Month  Yes Yes   Sig: Inhale 2 puffs into the lungs 2 times daily as needed for shortness of breath / dyspnea or wheezing   aspirin (ASA) 325 MG EC tablet 1/4/2023 at am  No Yes   Sig: Take 1 tablet (325 mg) by mouth daily for 30 days   Patient taking differently: Take 81 mg by mouth daily   atorvastatin (LIPITOR) 10 MG tablet 1/3/2023 at pm  Yes Yes   Sig: Take 10 mg by mouth every evening   brimonidine-timolol (COMBIGAN) 0.2-0.5 % ophthalmic solution 1/4/2023 at am  Yes Yes   Sig: Place 1 drop into both eyes 2 times daily   clopidogrel (PLAVIX) 75 MG tablet 1/4/2023 at am  No Yes   Sig: Take 1 tablet (75 mg) by mouth daily for 30 days   escitalopram (LEXAPRO) 20 MG tablet 1/3/2023 at pm  Yes Yes   Sig: Take 20 mg by mouth every evening   gabapentin (NEURONTIN) 400 MG capsule 1/4/2023 at am  Yes Yes   Sig: Take 1,200 mg by mouth 2 times daily   insulin glargine (LANTUS PEN) 100 UNIT/ML pen 1/4/2023 at am  No Yes   Sig: Inject 35 Units Subcutaneous At Bedtime for 30 days   Patient taking differently: Inject 35 Units Subcutaneous every morning   lamoTRIgine (LAMICTAL) 100 MG tablet 1/3/2023 at pm  Yes Yes   Sig: Take 300 mg by mouth every evening   liraglutide (VICTOZA) 18 MG/3ML solution 1/3/2023 at pm  Yes Yes   Sig: Inject 1.2 mg Subcutaneous daily   lisinopril (ZESTRIL) 10 MG tablet 1/4/2023 at am  Yes Yes   Sig: Take 20 mg by mouth daily   metFORMIN (GLUCOPHAGE-XR) 500 MG 24 hr tablet 1/4/2023 at am  Yes Yes   Sig: Take 1,000 mg by mouth 2 times daily (with meals)   montelukast (SINGULAIR) 10 MG tablet 1/3/2023 at hs  Yes Yes   Sig: Take 10 mg by mouth At Bedtime   traZODone (DESYREL) 150 MG tablet 1/3/2023 at hs  Yes Yes   Sig: Take 300 mg by mouth At  Bedtime   venlafaxine (EFFEXOR-XR) 75 MG 24 hr capsule 1/4/2023 at am  Yes Yes   Sig: Take 150 mg by mouth in the morning and 75 mg at night      Facility-Administered Medications: None        Review of Systems      No fever or chills  No cp, sob, cough or phlegm  No abdominal symptoms  No urinary symptoms        Physical Exam   Vital Signs: Temp: 98.1  F (36.7  C) Temp src: Oral BP: 102/67 Pulse: 64   Resp: 16 SpO2: 95 % O2 Device: None (Room air)    Weight: 195 lbs 0 oz       GENERAL: The patient is not in any acute distressed. Awake and alert.  HEENT: Nonicteric sclerae, PERRLA, EOMI. Oropharynx clear. Moist mucous membranes. Conjunctivae appear well perfused.  HEART: Regular rate and rhythm without murmurs.  LUNGS: Clear to auscultation bilaterally. No wheezing or crackles.  ABDOMEN: Soft, positive bowel sounds, nontender.  SKIN: No rash, no excessive bruising, petechiae, or purpura.  EXTREMITIES : no rashes, no swelling in legs.  NEUROLOGIC: conscious and oriented, follows commands, no obvious focal deficits.  ROS: All other systems negative       Medical Decision Making       45 MINUTES SPENT BY ME on the date of service doing chart review, history, exam, documentation & further activities per the note.  MANAGEMENT DISCUSSED with the following over the past 24 hours: wife, RN, patient       Data     I have personally reviewed the following data over the past 24 hrs:    11.2 (H)  \   13.0 (L)   / 401     138 104 55.6 (H) /  173 (H)   5.1 22 2.12 (H) \       ALT: 22 AST: 22 AP: 106 TBILI: 0.2   ALB: 4.4 TOT PROTEIN: 7.7 LIPASE: N/A       Trop: 53 (H) BNP: N/A       INR:  1.08 PTT:  N/A   D-dimer:  N/A Fibrinogen:  N/A

## 2023-01-06 ENCOUNTER — APPOINTMENT (OUTPATIENT)
Dept: PHYSICAL THERAPY | Facility: HOSPITAL | Age: 73
End: 2023-01-06
Payer: COMMERCIAL

## 2023-01-06 VITALS
BODY MASS INDEX: 33.29 KG/M2 | HEART RATE: 71 BPM | HEIGHT: 64 IN | DIASTOLIC BLOOD PRESSURE: 71 MMHG | SYSTOLIC BLOOD PRESSURE: 122 MMHG | OXYGEN SATURATION: 97 % | TEMPERATURE: 98.1 F | RESPIRATION RATE: 20 BRPM | WEIGHT: 195 LBS

## 2023-01-06 PROBLEM — N17.9 ACUTE KIDNEY INJURY (H): Status: ACTIVE | Noted: 2023-01-06

## 2023-01-06 LAB
ANION GAP SERPL CALCULATED.3IONS-SCNC: 9 MMOL/L (ref 7–15)
BUN SERPL-MCNC: 25.2 MG/DL (ref 8–23)
CALCIUM SERPL-MCNC: 9.1 MG/DL (ref 8.8–10.2)
CHLORIDE SERPL-SCNC: 102 MMOL/L (ref 98–107)
CREAT SERPL-MCNC: 1.25 MG/DL (ref 0.67–1.17)
DEPRECATED HCO3 PLAS-SCNC: 27 MMOL/L (ref 22–29)
ERYTHROCYTE [DISTWIDTH] IN BLOOD BY AUTOMATED COUNT: 11.8 % (ref 10–15)
GFR SERPL CREATININE-BSD FRML MDRD: 61 ML/MIN/1.73M2
GLUCOSE BLDC GLUCOMTR-MCNC: 152 MG/DL (ref 70–99)
GLUCOSE BLDC GLUCOMTR-MCNC: 99 MG/DL (ref 70–99)
GLUCOSE SERPL-MCNC: 132 MG/DL (ref 70–99)
HCT VFR BLD AUTO: 34.9 % (ref 40–53)
HGB BLD-MCNC: 11.1 G/DL (ref 13.3–17.7)
MCH RBC QN AUTO: 32.2 PG (ref 26.5–33)
MCHC RBC AUTO-ENTMCNC: 31.8 G/DL (ref 31.5–36.5)
MCV RBC AUTO: 101 FL (ref 78–100)
PLATELET # BLD AUTO: 306 10E3/UL (ref 150–450)
POTASSIUM SERPL-SCNC: 5.2 MMOL/L (ref 3.4–5.3)
RBC # BLD AUTO: 3.45 10E6/UL (ref 4.4–5.9)
SODIUM SERPL-SCNC: 138 MMOL/L (ref 136–145)
WBC # BLD AUTO: 9.9 10E3/UL (ref 4–11)

## 2023-01-06 PROCEDURE — 36415 COLL VENOUS BLD VENIPUNCTURE: CPT | Performed by: INTERNAL MEDICINE

## 2023-01-06 PROCEDURE — 99239 HOSP IP/OBS DSCHRG MGMT >30: CPT | Performed by: INTERNAL MEDICINE

## 2023-01-06 PROCEDURE — 96372 THER/PROPH/DIAG INJ SC/IM: CPT

## 2023-01-06 PROCEDURE — 85027 COMPLETE CBC AUTOMATED: CPT | Performed by: INTERNAL MEDICINE

## 2023-01-06 PROCEDURE — 97116 GAIT TRAINING THERAPY: CPT | Mod: GP

## 2023-01-06 PROCEDURE — G0378 HOSPITAL OBSERVATION PER HR: HCPCS

## 2023-01-06 PROCEDURE — 80048 BASIC METABOLIC PNL TOTAL CA: CPT | Performed by: INTERNAL MEDICINE

## 2023-01-06 PROCEDURE — 82962 GLUCOSE BLOOD TEST: CPT

## 2023-01-06 PROCEDURE — 97110 THERAPEUTIC EXERCISES: CPT | Mod: GP

## 2023-01-06 PROCEDURE — 250N000013 HC RX MED GY IP 250 OP 250 PS 637: Performed by: INTERNAL MEDICINE

## 2023-01-06 RX ORDER — MECLIZINE HCL 12.5 MG 12.5 MG/1
12.5 TABLET ORAL 3 TIMES DAILY PRN
Refills: 0
Start: 2023-01-06 | End: 2024-06-28

## 2023-01-06 RX ADMIN — INSULIN GLARGINE 35 UNITS: 100 INJECTION, SOLUTION SUBCUTANEOUS at 08:21

## 2023-01-06 RX ADMIN — VENLAFAXINE HYDROCHLORIDE 150 MG: 150 CAPSULE, EXTENDED RELEASE ORAL at 08:21

## 2023-01-06 RX ADMIN — LISINOPRIL 20 MG: 20 TABLET ORAL at 08:20

## 2023-01-06 RX ADMIN — BRIMONIDINE TARTRATE, TIMOLOL MALEATE 1 DROP: 2; 5 SOLUTION/ DROPS OPHTHALMIC at 08:24

## 2023-01-06 RX ADMIN — Medication 81 MG: at 08:21

## 2023-01-06 RX ADMIN — CLOPIDOGREL BISULFATE 75 MG: 75 TABLET ORAL at 08:21

## 2023-01-06 RX ADMIN — GABAPENTIN 1200 MG: 300 CAPSULE ORAL at 08:20

## 2023-01-06 ASSESSMENT — ACTIVITIES OF DAILY LIVING (ADL)
ADLS_ACUITY_SCORE: 35
ADLS_ACUITY_SCORE: 39
ADLS_ACUITY_SCORE: 35
ADLS_ACUITY_SCORE: 39
ADLS_ACUITY_SCORE: 35
ADLS_ACUITY_SCORE: 35

## 2023-01-06 NOTE — PLAN OF CARE
"Goal Outcome Evaluation:  Pt a/o x 4. Discharge delay as pt/spouse requested for TCU instead of discharge home.; expected to be discharge tomorrow per MD/ care manager. Pt denied pain/discomfort. Up to the bathroom with assist of 1. Will continued to monitor.                  Problem: Plan of Care - These are the overarching goals to be used throughout the patient stay.    Goal: Patient-Specific Goal (Individualized)  Description: You can add care plan individualizations to a care plan. Examples of Individualization might be:  \"Parent requests to be called daily at 9am for status\", \"I have a hard time hearing out of my right ear\", or \"Do not touch me to wake me up as it startles me\".  Outcome: Progressing     Problem: Plan of Care - These are the overarching goals to be used throughout the patient stay.    Goal: Plan of Care Review  Description: The Plan of Care Review/Shift note should be completed every shift.  The Outcome Evaluation is a brief statement about your assessment that the patient is improving, declining, or no change.  This information will be displayed automatically on your shift note.  Outcome: Progressing     Problem: Plan of Care - These are the overarching goals to be used throughout the patient stay.    Goal: Absence of Hospital-Acquired Illness or Injury  Intervention: Prevent Skin Injury  Recent Flowsheet Documentation  Taken 1/5/2023 1513 by Naseem Peace RN  Body Position: position changed independently     Problem: Plan of Care - These are the overarching goals to be used throughout the patient stay.    Goal: Optimal Comfort and Wellbeing  Outcome: Progressing     Problem: Plan of Care - These are the overarching goals to be used throughout the patient stay.    Goal: Readiness for Transition of Care  Outcome: Progressing         "

## 2023-01-06 NOTE — PLAN OF CARE
"PRIMARY DIAGNOSIS: \"GENERIC\" NURSING  OUTPATIENT/OBSERVATION GOALS TO BE MET BEFORE DISCHARGE:  ADLs back to baseline: Yes    Activity and level of assistance: Up with standby assistance.    Pain status: Pain free.    Return to near baseline physical activity: Yes     Discharge Planner Nurse   Safe discharge environment identified: Yes  Barriers to discharge: No       Entered by: Marcella Donovan RN 01/06/2023 2:40 PM     Please review provider order for any additional goals.   Nurse to notify provider when observation goals have been met and patient is ready for discharge.Goal Outcome Evaluation:                        "

## 2023-01-06 NOTE — PLAN OF CARE
"Goal Outcome Evaluation:                      PRIMARY DIAGNOSIS: \"GENERIC\" NURSING  OUTPATIENT/OBSERVATION GOALS TO BE MET BEFORE DISCHARGE:  ADLs back to baseline: No    Activity and level of assistance: Up with standby assistance.    Pain status: Pain free.    Return to near baseline physical activity: Yes     Discharge Planner Nurse   Safe discharge environment identified: Yes  Barriers to discharge: Yes         Entered by: Naseem Peace RN 01/06/2023 3:06 AM     Please review provider order for any additional goals.   Nurse to notify provider when observation goals have been met and patient is ready for discharge.  "

## 2023-01-06 NOTE — PLAN OF CARE
"Goal Outcome Evaluation: Pt is a/o x 4. Suppose to be discharge today,however, pt/spouse requesting for TCU instead of home discharge. Pt will be staying till tomorrow at this time.                      PRIMARY DIAGNOSIS: \"GENERIC\" NURSING  OUTPATIENT/OBSERVATION GOALS TO BE MET BEFORE DISCHARGE:  ADLs back to baseline: No    Activity and level of assistance: Up with standby assistance.    Pain status: Pain free.    Return to near baseline physical activity: Yes     Discharge Planner Nurse   Safe discharge environment identified: Yes  Barriers to discharge: Yes       Entered by: Naseem Peace RN 01/06/2023 3:03 AM     Please review provider order for any additional goals.   Nurse to notify provider when observation goals have been met and patient is ready for discharge.  "

## 2023-01-06 NOTE — PLAN OF CARE
"Goal Outcome Evaluation:                      PRIMARY DIAGNOSIS: \"GENERIC\" NURSING  OUTPATIENT/OBSERVATION GOALS TO BE MET BEFORE DISCHARGE:  ADLs back to baseline: No    Activity and level of assistance: Up with standby assistance.    Pain status: Pain free.    Return to near baseline physical activity: Yes     Discharge Planner Nurse   Safe discharge environment identified: Yes  Barriers to discharge: Yes         Entered by: Naseem Peace RN 01/06/2023 3:07 AM     Please review provider order for any additional goals.   Nurse to notify provider when observation goals have been met and patient is ready for discharge.  "

## 2023-01-06 NOTE — DISCHARGE SUMMARY
[facility]    Discharge Summary  Hospitalist    Date of Admission:  1/4/2023  Date of Discharge:  1/6/2023  Discharging Provider: Clifton Nuno MD, MD    Discharge Diagnoses   Principal Problem:    Dysequilibrium  Active Problems:    Recurrent major depressive disorder (H)    Anxiety    Hx of TIA (transient ischemic attack)    Essential hypertension    Bipolar 1 disorder (H)    Acute kidney injury (H)    DM type 2, with A1C 7.2    History of Present Illness   72 year old male admitted on 1/4/2023.  Recently hospitalized at [facility] for dysarthria and concern for TIA/CVA. There was oncern for TIA given improvement in symptoms.  CT and MRI brain negative for acute stroke. Neurology recommended treatment for TIA: Start aspirin 3 and 25 mg daily, Plavix load and Plavix 75 mg daily thereafter.  He has been admitted with dizziness that started yesterday. It feels like spinning around on standing up with some lightheadedness. No h/o black outs or syncope, no falls. He does have some slurred speech and weakness in legs - subjective ?, and has some chronic low backache.    In ER, /75, Creat 2.24 (baseline 1.07 two days earlier, potassium 5.7, WBC 11.2, trop 63, glucose 275.    Hospital Course   Placed on telemetry on observation, given IV fluids.  He did have mild nausea that resolved.  Was seen by Physical therapy and the first day suggested he could home as long as someone there to give stand-by assist, and he lives with his wife and she works from home -- but she requested he go to a TCU.  The next day he was able to walker independently, and discharge home with a walker, and could continue Meclizine 12.5 mg tid prn dizziness. He does take a lot of psychiatric medications (7 total) which might be contributing to this sensation, but could not identify one particular one.  Physical Therapy thought it might be a vestibular issue, and home PT arranged on discharge to help monitor his  symptoms.      His nausea resolved and Creat was improved to 1.25 at time of discharge, and encourage to continue to drink fluids.     He was seen by Neurology, and a cardiac event recorder for 2 weeks ordered to look for possible PAF that might account for his recent TIA.     Clifton Nuno MD  Pager: [phone]  Cell Phone:  [phone]       Significant Results and Procedures   As above    Pending Results   These results will be followed up by Dr. Nuno  Unresulted Labs Ordered in the Past 30 Days of this Admission     No orders found from 12/5/2022 to 1/5/2023.          Code Status   Full Code       Primary Care Physician   Lori Cerrato    Physical Exam   Temp: 98.1  F (36.7  C) Temp src: Oral BP: 122/71 Pulse: 71   Resp: 20 SpO2: 97 % O2 Device: None (Room air)    Vitals:    01/04/23 1311   Weight: 88.5 kg (195 lb)     Vital Signs with Ranges  Temp:  [97.9  F (36.6  C)-98.5  F (36.9  C)] 98.1  F (36.7  C)  Pulse:  [68-73] 71  Resp:  [16-20] 20  BP: (117-156)/(6-71) 122/71  SpO2:  [93 %-97 %] 97 %  I/O last 3 completed shifts:  In: 600 [P.O.:600]  Out: -     Exam on discharge:   Lungs clear  CV with reg S1S2  Neuro -- no focal deficits.     Discharge Disposition   Discharged to Home with Home Care PT  Condition at discharge: Stable    Consultations This Hospital Stay   NEUROLOGY IP CONSULT  PHYSICAL THERAPY ADULT IP CONSULT  PHYSICAL THERAPY ADULT IP CONSULT  CARE MANAGEMENT / SOCIAL WORK IP CONSULT  CARE MANAGEMENT / SOCIAL WORK IP CONSULT    Time Spent on this Encounter   I spent a total of 35 minutes discharging this patient.     Discharge Orders      Reason for your hospital stay    Dizziness, probable inner ear problem.     Follow-up and recommended labs and tests     Follow up with primary care provider, Lori Cerrato, in 1 week.     Activity    Your activity upon discharge: activity as tolerated, use walker when up.     Walker    I, the undersigned, certify that the above  prescribed supplies are medically necessary for this patient and is both reasonable and necessary in reference to accepted standards of medical and necessary in reference to accepted standards of medical practice in the treatment of this patient's condition and is not prescribed as a convenience.      Diet    Follow this diet upon discharge: Orders Placed This Encounter      Moderate Consistent Carb (60 g CHO per Meal) Diet     Discharge Medications   Current Discharge Medication List      START taking these medications    Details   meclizine (ANTIVERT) 12.5 MG tablet Take 1 tablet (12.5 mg) by mouth 3 times daily as needed for dizziness  Qty: 30 tablet, Refills: 1    Associated Diagnoses: Dysequilibrium         CONTINUE these medications which have CHANGED    Details   aspirin (ASA) 81 MG EC tablet Take 1 tablet (81 mg) by mouth daily  Refills: 0    Associated Diagnoses: TIA (transient ischemic attack)         CONTINUE these medications which have NOT CHANGED    Details   albuterol (PROAIR HFA/PROVENTIL HFA/VENTOLIN HFA) 108 (90 Base) MCG/ACT inhaler Inhale 2 puffs into the lungs 2 times daily as needed for shortness of breath / dyspnea or wheezing    Comments: Pharmacy may dispense brand covered by insurance (Proair, or proventil or ventolin or generic albuterol inhaler)      ARIPiprazole (ABILIFY) 20 MG tablet Take 20 mg by mouth every evening      atorvastatin (LIPITOR) 10 MG tablet Take 10 mg by mouth every evening      brimonidine-timolol (COMBIGAN) 0.2-0.5 % ophthalmic solution Place 1 drop into both eyes 2 times daily      clopidogrel (PLAVIX) 75 MG tablet Take 1 tablet (75 mg) by mouth daily for 30 days  Qty: 30 tablet, Refills: 0    Associated Diagnoses: TIA (transient ischemic attack)      escitalopram (LEXAPRO) 20 MG tablet Take 20 mg by mouth every evening      gabapentin (NEURONTIN) 400 MG capsule Take 1,200 mg by mouth 2 times daily      insulin glargine (LANTUS PEN) 100 UNIT/ML pen Inject 35 Units  Subcutaneous At Bedtime for 30 days  Qty: 15 mL, Refills: 0    Comments: If Lantus is not covered by insurance, may substitute Basaglar or Semglee or other insulin glargine product per insurance preference at same dose and frequency.    Associated Diagnoses: Controlled type 2 diabetes mellitus with hypoglycemia, without long-term current use of insulin (H)      lamoTRIgine (LAMICTAL) 100 MG tablet Take 300 mg by mouth every evening      liraglutide (VICTOZA) 18 MG/3ML solution Inject 1.2 mg Subcutaneous daily      lisinopril (ZESTRIL) 10 MG tablet Take 20 mg by mouth daily      metFORMIN (GLUCOPHAGE-XR) 500 MG 24 hr tablet Take 1,000 mg by mouth 2 times daily (with meals)      montelukast (SINGULAIR) 10 MG tablet Take 10 mg by mouth At Bedtime      OLANZapine (ZYPREXA) 10 MG tablet Take 10 mg by mouth At Bedtime      traZODone (DESYREL) 150 MG tablet Take 300 mg by mouth At Bedtime      venlafaxine (EFFEXOR-XR) 75 MG 24 hr capsule Take 150 mg by mouth in the morning and 75 mg at night           Allergies   Allergies   Allergen Reactions     Bupropion Diarrhea     Erythromycin Diarrhea     Theophylline GI Disturbance     Data   Most Recent 3 CBC's:Recent Labs   Lab Test 01/06/23  0837 01/04/23  1346 01/01/23  0655   WBC 9.9 11.2* 9.7   HGB 11.1* 13.0* 11.4*   * 100 101*    401 304      Most Recent 3 BMP's:  Recent Labs   Lab Test 01/06/23  1231 01/06/23  0837 01/06/23  0805 01/05/23  0856 01/05/23  0745 01/04/23  2111 01/04/23  1650   NA  --  138  --   --  139  --  138   POTASSIUM  --  5.2  --   --  4.7  --  5.1   CHLORIDE  --  102  --   --  107  --  104   CO2  --  27  --   --  22  --  22   BUN  --  25.2*  --   --  41.5*  --  55.6*   CR  --  1.25*  --   --  1.34*  --  2.12*   ANIONGAP  --  9  --   --  10  --  12   JAYME  --  9.1  --   --  8.6*  --  8.7*   * 132* 99   < > 123*   < > 173*    < > = values in this interval not displayed.     Most Recent 2 LFT's:  Recent Labs   Lab Test 01/04/23  6436  12/16/21  1127   AST 22 37   ALT 22 21   ALKPHOS 106 95   BILITOTAL 0.2 0.3     Most Recent INR's and Anticoagulation Dosing History:  Anticoagulation Dose History     Recent Dosing and Labs Latest Ref Rng & Units 4/23/2020 12/30/2022 1/4/2023    INR 0.85 - 1.15 1.18(H) 0.93 1.08        Most Recent 3 Troponin's:No lab results found.  Most Recent Cholesterol Panel:  Recent Labs   Lab Test 12/30/22  1935   CHOL 125   LDL 72   HDL 37*   TRIG 82     Most Recent 6 Bacteria Isolates From Any Culture (See EPIC Reports for Culture Details):No lab results found.  Most Recent TSH, T4 and A1c Labs:  Recent Labs   Lab Test 12/30/22  1656   TSH 3.16   A1C 7.2*

## 2023-01-06 NOTE — PLAN OF CARE
Physical Therapy Discharge Summary    Reason for therapy discharge:    Discharged to home with home therapy.    Progress towards therapy goal(s). See goals on Care Plan in [facility] electronic health record for goal details.  Goals partially met.  Barriers to achieving goals:   discharge from facility.    Therapy recommendation(s):    Continued therapy is recommended.  Rationale/Recommendations:  Continue with HEP and home PT as pt is progressing towards goals..

## 2023-01-06 NOTE — PROGRESS NOTES
NEUROLOGY INPATIENT PROGRESS NOTE       [facility] NEUROLOGY   [address] [place] [address]  [email or web address]     ASSESSMENT & PLAN   Date: 01/06/2023  Hospital Day#: 2  Visit diagnosis:   Dizziness  TIA  Diplopia (chronic)    The patient has had two episodes of dizziness. He feels better now. No stroke on MRI brain but can't rule out TIA.    Would strongly suggest ordering cardiac monitoring at discharge for 30 days. If positive for atrial fibrillation switch to Eliquis. For now keep dual antiplatelet therapy for 21 days then switch to Aspirin monotherapy    LDL goal 40-70 on atorvastatin  HbA1c goal < 7.0 working with PCP  SBP goal < 135, DBP < 85 on lisinopril    Diplopia likely due to past fall can see ophtho for prism glasses evaluation    Neurology will sign off. Can follow up with [facility] team in 3 months        MD GEMINI CasillasN   of Neurology  [facility]  Clinic: [phone]  Pager: [phone]     PROBLEM LIST      Patient Active Problem List   Diagnosis Code     Respiratory insufficiency R06.89     Controlled type 2 diabetes mellitus, without long-term current use of insulin (H) E11.9     Recurrent major depressive disorder (H) F33.9     Asthma J45.909     Depression F32.A     Anxiety F41.9     Bipolar I disorder (H) F31.9     Other insomnia G47.09     Hx of TIA (transient ischemic attack) G45.9     Disorder of stomach K31.9     Gastritis K29.70     Essential hypertension I10     Hyperlipidemia E78.5     Indigestion K30     Moderate persistent asthma without complication J45.40     Primary localized osteoarthritis of pelvic region and thigh M16.10     Vitamin B12 deficiency (non anemic) E53.8     Bipolar 1 disorder (H) F31.9     Mixed anxiety and depressive disorder F41.8     Insomnia G47.00     Dysequilibrium R42     Acute kidney injury (H) N17.9       Past Medical History:   Patient  has a past medical history of Anxiety  (04/24/2020), Asthma exacerbation (09/11/2017), Bipolar I disorder (H) (04/24/2020), Controlled type 2 diabetes mellitus, without long-term current use of insulin (H) (09/11/2017), Mixed hyperlipidemia, Recurrent major depressive disorder (H) (09/11/2017), and Respiratory insufficiency (09/11/2017).     SUBJECTIVE     Pt feels better    PT did not see peripheral vertigo    He has long standing diplopia after a fall     OBJECTIVE     Vital signs in last 24 hours  Temp:  [97.9  F (36.6  C)-98.5  F (36.9  C)] 98.2  F (36.8  C)  Pulse:  [68-75] 73  Resp:  [16-20] 20  BP: (117-161)/(6-74) 156/70  SpO2:  [93 %-95 %] 95 %    Weight:   Wt Readings from Last 1 Encounters:   01/04/23 88.5 kg (195 lb)         I/O last 24 hours    Intake/Output Summary (Last 24 hours) at 1/6/2023 0929  Last data filed at 1/5/2023 1842  Gross per 24 hour   Intake 600 ml   Output --   Net 600 ml         General Physical Exam:   Awak, alert, NAD  Neurological Exam:  No nystagmus  No dysarthria or aphasia  Finger taps normal  Finger to nose norml       DIAGNOSTIC STUDIES       Pertinent Labs   Lab Results: Personally Reviewed  Recent Results (from the past 24 hour(s))   Glucose by meter    Collection Time: 01/05/23 12:57 PM   Result Value Ref Range    GLUCOSE BY METER POCT 196 (H) 70 - 99 mg/dL   Glucose by meter    Collection Time: 01/05/23  4:10 PM   Result Value Ref Range    GLUCOSE BY METER POCT 165 (H) 70 - 99 mg/dL   Glucose by meter    Collection Time: 01/05/23  4:49 PM   Result Value Ref Range    GLUCOSE BY METER POCT 171 (H) 70 - 99 mg/dL   Glucose by meter    Collection Time: 01/05/23  8:56 PM   Result Value Ref Range    GLUCOSE BY METER POCT 120 (H) 70 - 99 mg/dL   Glucose by meter    Collection Time: 01/06/23  8:05 AM   Result Value Ref Range    GLUCOSE BY METER POCT 99 70 - 99 mg/dL   Basic metabolic panel    Collection Time: 01/06/23  8:37 AM   Result Value Ref Range    Sodium 138 136 - 145 mmol/L    Potassium 5.2 3.4 - 5.3 mmol/L     Chloride 102 98 - 107 mmol/L    Carbon Dioxide (CO2) 27 22 - 29 mmol/L    Anion Gap 9 7 - 15 mmol/L    Urea Nitrogen 25.2 (H) 8.0 - 23.0 mg/dL    Creatinine 1.25 (H) 0.67 - 1.17 mg/dL    Calcium 9.1 8.8 - 10.2 mg/dL    Glucose 132 (H) 70 - 99 mg/dL    GFR Estimate 61 >60 mL/min/1.73m2   CBC with platelets    Collection Time: 01/06/23  8:37 AM   Result Value Ref Range    WBC Count 9.9 4.0 - 11.0 10e3/uL    RBC Count 3.45 (L) 4.40 - 5.90 10e6/uL    Hemoglobin 11.1 (L) 13.3 - 17.7 g/dL    Hematocrit 34.9 (L) 40.0 - 53.0 %     (H) 78 - 100 fL    MCH 32.2 26.5 - 33.0 pg    MCHC 31.8 31.5 - 36.5 g/dL    RDW 11.8 10.0 - 15.0 %    Platelet Count 306 150 - 450 10e3/uL         HOSPITAL MEDICATIONS       ARIPiprazole  20 mg Oral QPM     aspirin  81 mg Oral Daily     atorvastatin  10 mg Oral QPM     brimonidine-timolol  1 drop Both Eyes BID     clopidogrel  75 mg Oral Daily     escitalopram  20 mg Oral QPM     gabapentin  1,200 mg Oral BID     insulin aspart  1-7 Units Subcutaneous TID AC     insulin aspart  1-5 Units Subcutaneous At Bedtime     insulin glargine  35 Units Subcutaneous QAM     lamoTRIgine  300 mg Oral QPM     liraglutide  1.2 mg Subcutaneous Daily     lisinopril  20 mg Oral Daily     montelukast  10 mg Oral At Bedtime     OLANZapine  10 mg Oral At Bedtime     traZODone  300 mg Oral At Bedtime     venlafaxine  150 mg Oral Daily with breakfast        Total time caring for the patient today was 35 minutes. I reviewed chart, spoke with PT, wrote note and met with patient.       This note was dictated using voice recognition software.  Any grammatical or context distortions are unintentional and inherent to the software.

## 2023-01-06 NOTE — PLAN OF CARE
"PRIMARY DIAGNOSIS: \"GENERIC\" NURSING  OUTPATIENT/OBSERVATION GOALS TO BE MET BEFORE DISCHARGE:  ADLs back to baseline: Yes    Activity and level of assistance: Up with standby assistance.    Pain status: Pain free.    Return to near baseline physical activity: Yes     Discharge Planner Nurse   Safe discharge environment identified: Yes  Barriers to discharge: No       Entered by: Marcella Donovan RN 01/06/2023 2:39 PM     Please review provider order for any additional goals.   Nurse to notify provider when observation goals have been met and patient is ready for discharge.Goal Outcome Evaluation:                        "

## 2023-01-06 NOTE — UTILIZATION REVIEW
Concurrent stay review; Secondary Review Determination    [facility]      Under the authority of the Utilization Management Committee, the utilization review process indicated a secondary review on the above patient.  The review outcome is based on review of the medical records, discussions with staff, and applying clinical experience noted on the date of the review.        (x) Observation/outpatient Status Appropriate - Concurrent stay review       RATIONALE FOR DETERMINATION:     Mr. Moser is a 71 yo male pt who presents to the ED with dizziness.  NO CVA on CT or MRI imaging.  Neuro consulted; cannot r/o TIA.  Plan for outpt cardiac monitoring and dual-platelet medication and continued monitoring for ongoing risk management. Symptoms have resolved - was to be discharged home last evening but family felt that they could not safely care for him if discharged.      I have a page out to Dr. Crockett to discuss.     Patient delayed discharge is related to disposition, there is no medical necessity for inpatient admission at the time of this review. If there is a change in patient status, please resend for review.    The information on this document is developed by the utilization review team in order for the business office to ensure compliance.  This only denotes the appropriateness of proper admission status and does not reflect the quality of care rendered.       The definitions of Inpatient Status and Observation Status used in making the determination above are those provided in the CMS Coverage Manual, Chapter 1 and Chapter 6, section 70.4.       Sincerely,    Mallory Teran, DO

## 2023-01-06 NOTE — PLAN OF CARE
"PRIMARY DIAGNOSIS: \"GENERIC\" NURSING  OUTPATIENT/OBSERVATION GOALS TO BE MET BEFORE DISCHARGE:  ADLs back to baseline: No    Activity and level of assistance: Up with standby assistance.    Pain status: Pain free.    Return to near baseline physical activity: Yes     Discharge Planner Nurse   Safe discharge environment identified: Yes  Barriers to discharge: Yes         Entered by: Naseem Peace RN 01/06/2023 3:32 AM     Please review provider order for any additional goals.   Nurse to notify provider when observation goals have been met and patient is ready for discharge.Goal Outcome Evaluation:                        "

## 2023-01-06 NOTE — PROGRESS NOTES
Care Management Discharge Note    Discharge Date: 01/06/2023       Discharge Disposition:  Home with home care services and wife  Discharge Services:  PT    Discharge DME:      Discharge Transportation: family will provide    Private pay costs discussed: Not applicable    PAS Confirmation Code:    Patient/family educated on Medicare website which has current facility and service quality ratings:  yes    Education Provided on the Discharge Plan:  yes  Persons Notified of Discharge Plans: yes  Patient/Family in Agreement with the Plan: yes    Handoff Referral Completed: Yes    Additional Information:  SHERIE met with pt regarding discharge. Pt updated with [facility] and [facility] having no bed availability.   SHERIE left message for Nenita - liaison with [facility] about [facility] locations.     Pt discharging to home with home care PT. MD states pt medically ready to discharge. SHERIE sent out referral for therapy      ROSS Workman

## 2023-01-07 NOTE — PLAN OF CARE
Goal Outcome Evaluation:         Pt discharging home.  I left a message with heart care that pt needs a event monitor, they are closed, I gave the pt the number also to call if they don't hear back.  They will follow up with a MD on Monday at his follow up appt.  PT brought pt a walker.  Paperwork reviewed including medication changes.  Pt's wife said pt doesn't like meclizine but he has the printed Rx.  No further questions.  Olga Dunn, RN

## 2023-01-08 NOTE — DISCHARGE SUMMARY
[facility]  Hospitalist Discharge Summary      Date of Admission:  12/30/2022  Date of Discharge:  1/2/2023  3:34 PM  Discharging Provider: Kelly Guerra MD  Discharge Service: Hospitalist Service    Discharge Diagnoses   Possible TIA with dysarthria  DM2 with hypoglycemia and poor diet management  CKD stage 3  Ashtma    Follow-ups Needed After Discharge   Follow-up Appointments     Follow-up and recommended labs and tests       Follow up with primary care provider, Lori Cerrato, within 7   days to evaluate medication change, for hospital follow- up, and regarding   new diagnosis.  The following labs/tests are recommended: kyara glucose.               Unresulted Labs Ordered in the Past 30 Days of this Admission     No orders found from 11/30/2022 to 12/31/2022.      These results will be followed up by Kelly Guerra    Discharge Disposition   Discharged to home  Condition at discharge: Stable    Hospital Course   Mr. Trevino is a 72-year-old male with history of hypertension, CKD 3, DM2, HLP, asthma, anxiety, depression and bipolar disorder who presents with dysarthria and concern for CVA/TIA. His symptoms resolved. He also had episodes of hypoglycemia. He admits to drinking up to 4 sodas per sitting out with friends along with soda and orange juice at home. He was counseled multiple times on his diet and seen by the diabetes educator with more counseling. He was Shown how to use his kyara device and the DM educator showed them how to place it. He was given parameters for monitoring his sugars. He was very concerned about high and I encouraged hi to be more concerned about lows and following a better diet.     Consultations This Hospital Stay   CARE MANAGEMENT / SOCIAL WORK IP CONSULT  NEUROLOGY IP STROKE CONSULT  SPEECH LANGUAGE PATH ADULT IP CONSULT  PHARMACY IP CONSULT  PHARMACY IP CONSULT  PHARMACY IP CONSULT  PHYSICAL THERAPY ADULT IP CONSULT  OCCUPATIONAL THERAPY  ADULT IP CONSULT  REHAB ADMISSIONS LIAISON IP CONSULT  CARE MANAGEMENT / SOCIAL WORK IP CONSULT  DIABETES EDUCATION IP CONSULT  SMOKING CESSATION PROGRAM IP CONSULT    Code Status   Prior    Time Spent on this Encounter   I, Kelly Guerra MD, personally saw the patient today and spent greater than 30 minutes discharging this patient.       Kelly Guerra MD  [address] [address]  Phone: [phone]  Fax: [phone]  ______________________________________________________________________    Physical Exam   Vital Signs:                   Weight: 197 lbs 6.4 oz  General Appearance: Awake, alert, in no acute distress  Respiratory: CTAB, no wheeze  Cardiovascular: RRR, no murmur noted  GI: soft, nontender, non distended, normal bowel sounds  Skin: no jaundice, no rash         Primary Care Physician   Lori Cerrato    Discharge Orders      AMB Adult Diabetes Educator Referral      Reason for your hospital stay    TIA, hypoglycemia     Follow-up and recommended labs and tests     Follow up with primary care provider, Lori Cerrato, within 7 days to evaluate medication change, for hospital follow- up, and regarding new diagnosis.  The following labs/tests are recommended: kyara glucose.     Activity    Your activity upon discharge: activity as tolerated     Diet    Follow this diet upon discharge: Moderate Consistent Carb (60 g CHO per Meal) Diet       Significant Results and Procedures   Most Recent 3 CBC's:Recent Labs   Lab Test 01/06/23  0837 01/04/23  1346 01/01/23  0655   WBC 9.9 11.2* 9.7   HGB 11.1* 13.0* 11.4*   * 100 101*    401 304     Most Recent 3 BMP's:Recent Labs   Lab Test 01/06/23  1231 01/06/23  0837 01/06/23  0805 01/05/23  0856 01/05/23  0745 01/04/23  2111 01/04/23  1650   NA  --  138  --   --  139  --  138   POTASSIUM  --  5.2  --   --  4.7  --  5.1   CHLORIDE  --  102  --   --  107  --  104   CO2  --   27  --   --  22  --  22   BUN  --  25.2*  --   --  41.5*  --  55.6*   CR  --  1.25*  --   --  1.34*  --  2.12*   ANIONGAP  --  9  --   --  10  --  12   JAYME  --  9.1  --   --  8.6*  --  8.7*   * 132* 99   < > 123*   < > 173*    < > = values in this interval not displayed.     Most Recent 2 LFT's:Recent Labs   Lab Test 01/04/23  1346 12/16/21  1127   AST 22 37   ALT 22 21   ALKPHOS 106 95   BILITOTAL 0.2 0.3   ,   Results for orders placed or performed during the hospital encounter of 12/30/22   CTA Head Neck with Contrast    Narrative    EXAM: CTA HEAD NECK W CONTRAST  LOCATION: [facility]  DATE/TIME: 12/30/2022 5:23 PM    INDICATION: slurred speech  COMPARISON: None.  CONTRAST: isovue 370 75ml  TECHNIQUE: Head and neck CT angiogram with IV contrast. Noncontrast head CT followed by axial helical CT images of the head and neck vessels obtained during the arterial phase of intravenous contrast administration. Axial 2D reconstructed images and   multiplanar 3D MIP reconstructed images of the head and neck vessels were performed by the technologist. Dose reduction techniques were used. All stenosis measurements made according to NASCET criteria unless otherwise specified.    FINDINGS:   NONCONTRAST HEAD CT:   INTRACRANIAL CONTENTS: No intracranial hemorrhage, extraaxial collection, or mass effect.  No CT evidence of acute infarct. Mild presumed chronic small vessel ischemic changes. Mild generalized volume loss. No hydrocephalus.     VISUALIZED ORBITS/SINUSES/MASTOIDS: No intraorbital abnormality. No paranasal sinus mucosal disease. No middle ear or mastoid effusion.    BONES/SOFT TISSUES: No acute abnormality.    HEAD CTA:  ANTERIOR CIRCULATION: No stenosis/occlusion, aneurysm, or high flow vascular malformation. Fetal origin left posterior cerebral artery.    POSTERIOR CIRCULATION: No stenosis/occlusion, aneurysm, or high flow vascular malformation. Dominant left and smaller right  vertebral artery contribute to a normal basilar artery.     DURAL VENOUS SINUSES: Expected enhancement of the major dural venous sinuses.    NECK CTA:  RIGHT CAROTID: No measurable stenosis or dissection.    LEFT CAROTID: No measurable stenosis or dissection.    VERTEBRAL ARTERIES: No focal stenosis or dissection. Dominant left and smaller right vertebral arteries.    AORTIC ARCH: Classic aortic arch anatomy with no significant stenosis at the origin of the great vessels.    NONVASCULAR STRUCTURES: Unremarkable.      Impression    IMPRESSION:   HEAD CT:  1.  No acute intracranial process.    HEAD CTA:   1.  Normal CTA [place].    NECK CTA:  1.  Normal neck CTA.    Results were called to Dr. Sheppard at 12/30/2022 5:26 PM.   MR Brain w/o & w Contrast    Narrative    EXAM: MR BRAIN W/O and W CONTRAST  LOCATION: [facility]  DATE/TIME: 12/30/2022 7:14 PM    INDICATION: Slurred speech.  COMPARISON: Head CT/CT angiogram 12/30/2022.  CONTRAST: 9ml Gadavist  TECHNIQUE: Routine multiplanar multisequence head MRI without and with intravenous contrast.    FINDINGS:  INTRACRANIAL CONTENTS: There is no evidence for acute or subacute infarction. Nothing for restricted diffusion abnormalities present intracranially. No mass, acute or chronic hemorrhage, or extra-axial fluid collections. Scattered nonspecific T2/FLAIR   hyperintensities within the cerebral white matter and pancho most consistent with mild chronic microvascular ischemic change. Moderate generalized cerebral atrophy. No hydrocephalus. Corpus callosum is normal. Normal position of the cerebellar tonsils.   Postcontrast imaging shows no pathologic enhancement. No abnormal intraparenchymal leptomeningeal or dural enhancement. Dural venous sinus enhancement characteristics are normal. Meckel's cave and cavernous sinus enhancement is normal. No pathologic   enhancement at the skull base level.    SELLA: No abnormality accounting for  technique.    OSSEOUS STRUCTURES/SOFT TISSUES: Normal marrow signal. No diploic space signal/enhancement abnormality is present. Major intracranial vascular flow voids are maintained.     ORBITS: No abnormality accounting for technique. No pathologic orbital enhancement.    SINUSES/MASTOIDS: Mucosal thickening primarily involving the ethmoid air cells. No air-fluid levels. Mastoid air cells are clear.       Impression    IMPRESSION:  1.  No acute intracranial process.    2.  Generalized brain atrophy and presumed microvascular ischemic changes as detailed above.    3.  Nothing for acute or evolving infarction intracranially.    4.  No pathologic enhancement.    5.  Please see above for details and description.   Echocardiogram Complete - For age > 60 yrs     Value    LVEF  60-65%    Narrative    [id number]  [id number]  [id number]  [id number]^DALLAS^CLEMENTINE^[place] [id number]     Name: KENISHA MAZARIEGOS  MRN: [id number]  : 1950  Study Date: 2022 10:09 AM  Age: 72 yrs  Gender: Male  Patient Location: [facility]  Reason For Study: Cerebrovascular Incident  Ordering Physician: CLEMENTINE HAYNES  Performed By: ACE     BSA: 1.9 m2  Height: 64 in  Weight: 197 lb  HR: 84  BP: 146/82 mmHg  ______________________________________________________________________________  Procedure  Complete Echo Adult.  ______________________________________________________________________________  Interpretation Summary     1. The left ventricle is normal in size. Left ventricular function is  normal.The ejection fraction is 60-65%. No regional wall motion abnormalities  noted.  2. Normal right ventricle size and systolic function.  3. No hemodynamically significant valvular abnormalities on 2D or color flow  imaging.  ______________________________________________________________________________  Left Ventricle  The left ventricle is normal in size. Left ventricular function is  normal.The  ejection fraction is 60-65%. There is normal left ventricular wall thickness.  Left ventricular diastolic function is normal. No regional wall motion  abnormalities noted.     Right Ventricle  Normal right ventricle size and systolic function.     Atria  Normal left atrial size. Right atrial size is normal. There is no color  Doppler evidence of an atrial shunt.     Mitral Valve  Mitral valve leaflets appear normal. There is no evidence of mitral stenosis  or clinically significant mitral regurgitation.     Tricuspid Valve  Tricuspid valve leaflets appear normal. There is no evidence of tricuspid  stenosis or clinically significant tricuspid regurgitation. Right ventricle  systolic pressure estimate normal.     Aortic Valve  The aortic valve is trileaflet. Aortic valve leaflets appear normal. There is  no evidence of aortic stenosis or clinically significant aortic regurgitation.     Pulmonic Valve  The pulmonic valve is not well seen, but is grossly normal. This degree of  valvular regurgitation is within normal limits. There is trace pulmonic  valvular regurgitation.     Vessels  The aorta root is normal. Normal size ascending aorta. IVC diameter <2.1 cm  collapsing >50% with sniff suggests a normal RA pressure of 3 mmHg.     Pericardium  There is no pericardial effusion.     ______________________________________________________________________________  MMode/2D Measurements & Calculations  IVSd: 0.74 cm  LVIDd: 4.1 cm  LVIDs: 2.7 cm  LVPWd: 0.78 cm  FS: 34.1 %     LV mass(C)d: 92.4 grams  LV mass(C)dI: 47.5 grams/m2  Ao root diam: 3.9 cm  LA dimension: 3.5 cm  LA/Ao: 0.90  LVOT diam: 2.1 cm  LVOT area: 3.5 cm2  LA Volume Indexed (AL/bp): 23.8 ml/m2  RWT: 0.38     Time Measurements  MM HR: 75.0 BPM     Doppler Measurements & Calculations  MV E max marlon: 64.0 cm/sec  MV A max marlon: 90.8 cm/sec  MV E/A: 0.70  MV dec slope: 308.0 cm/sec2  MV dec time: 0.21 sec  Ao V2 max: 151.0 cm/sec  Ao max P.0  mmHg  Ao V2 mean: 102.0 cm/sec  Ao mean P.0 mmHg  Ao V2 VTI: 31.2 cm  OWEN(I,D): 2.3 cm2  OWEN(V,D): 2.5 cm2  LV V1 max P.7 mmHg  LV V1 max: 108.0 cm/sec  LV V1 VTI: 20.8 cm  SV(LVOT): 72.0 ml  SI(LVOT): 37.1 ml/m2  PA acc time: 0.09 sec  AV David Ratio (DI): 0.72  OWEN Index (cm2/m2): 1.2  E/E': 6.8  E/E' av.0  Lateral E/e': 6.8  Medial E/e': 7.3  Peak E' David: 9.5 cm/sec     ______________________________________________________________________________  Report approved by: Dianne Cole 2022 01:06 PM               Discharge Medications   Discharge Medication List as of 2023  2:17 PM      START taking these medications    Details   clopidogrel (PLAVIX) 75 MG tablet Take 1 tablet (75 mg) by mouth daily for 30 days, Disp-30 tablet, R-0, E-Prescribe      aspirin (ASA) 325 MG EC tablet Take 1 tablet (325 mg) by mouth daily for 30 days, Disp-30 tablet, R-0, E-Prescribe         CONTINUE these medications which have CHANGED    Details   insulin glargine (LANTUS PEN) 100 UNIT/ML pen Inject 35 Units Subcutaneous At Bedtime for 30 days, Disp-15 mL, R-0, E-PrescribeIf Lantus is not covered by insurance, may substitute Basaglar or Semglee or other insulin glargine product per insurance preference at same dose and frequency.           CONTINUE these medications which have NOT CHANGED    Details   albuterol (PROAIR HFA/PROVENTIL HFA/VENTOLIN HFA) 108 (90 Base) MCG/ACT inhaler Inhale 2 puffs into the lungs 2 times daily as needed for shortness of breath / dyspnea or wheezing, HistoricalPharmacy may dispense brand covered by insurance (Proair, or proventil or ventolin or generic albuterol inhaler)      ARIPiprazole (ABILIFY) 20 MG tablet Take 20 mg by mouth every evening, Historical      atorvastatin (LIPITOR) 10 MG tablet Take 10 mg by mouth every evening, Historical      brimonidine-timolol (COMBIGAN) 0.2-0.5 % ophthalmic solution Place 1 drop into both eyes 2 times daily, Historical      escitalopram  (LEXAPRO) 20 MG tablet Take 20 mg by mouth every evening, Historical      gabapentin (NEURONTIN) 400 MG capsule Take 1,200 mg by mouth 2 times daily, Historical      lamoTRIgine (LAMICTAL) 100 MG tablet Take 300 mg by mouth every evening, Historical      liraglutide (VICTOZA) 18 MG/3ML solution Inject 1.2 mg Subcutaneous daily as needed, Historical      lisinopril (ZESTRIL) 10 MG tablet Take 20 mg by mouth daily, Historical      metFORMIN (GLUCOPHAGE-XR) 500 MG 24 hr tablet Take 1,000 mg by mouth 2 times daily (with meals), Historical      montelukast (SINGULAIR) 10 MG tablet Take 10 mg by mouth At Bedtime, Historical      OLANZapine (ZYPREXA) 10 MG tablet Take 10 mg by mouth At Bedtime, Historical      traZODone (DESYREL) 150 MG tablet Take 300 mg by mouth At Bedtime, Historical      venlafaxine (EFFEXOR-XR) 75 MG 24 hr capsule Take 150 mg by mouth in the morning and 75 mg at night, Historical         STOP taking these medications       glimepiride (AMARYL) 4 MG tablet Comments:   Reason for Stopping:         ipratropium (ATROVENT HFA) 17 MCG/ACT inhaler Comments:   Reason for Stopping:         zolpidem (AMBIEN) 10 MG tablet Comments:   Reason for Stopping:             Allergies   Allergies   Allergen Reactions     Bupropion Diarrhea     Erythromycin Diarrhea     Theophylline GI Disturbance

## 2023-01-09 ENCOUNTER — PATIENT OUTREACH (OUTPATIENT)
Dept: CARE COORDINATION | Facility: CLINIC | Age: 73
End: 2023-01-09

## 2023-01-09 NOTE — PROGRESS NOTES
"Clinic Care Coordination Contact  [facility]: Post-Discharge Note  SITUATION                                                      Admission:    Admission Date: 01/04/23   Reason for Admission: 1. TIA (transient ischemic attack)   2. Dizziness   3. Slurred speech  Discharge:   Discharge Date: 01/06/23  Discharge Diagnosis: Principal Problem:    Dysequilibrium  Active Problems:    Recurrent major depressive disorder (H)    Anxiety    Hx of TIA (transient ischemic attack)    Essential hypertension    Bipolar 1 disorder (H)    Acute kidney injury (H)    DM type 2, with A1C 7.2    BACKGROUND                                                      Per hospital discharge summary and inpatient provider notes:    Montez Cabrera is a 72 year old male with a pertinent history of TIA, mixed hyperlipidemia, controlled type 2 diabetes mellitus, essential hypertension, who presents to this ED for evaluation of dizziness.     Patient reports persistent sudden onset dizziness since yesterday. He says he woke up feeling fine but in the afternoon, he experienced the onset sudden dizziness. He said that due to dizziness, he was unable to walk around. He describes the dizziness as the \"room spinning.\" He says he is not experiencing any dizziness currently while sitting down but it's worse when turning his head. He also endorses slurred speech since yesterday. He says this feels similar to when he had a TIA previously which also displayed symptoms of dizziness and slurred speech. He denies facial numbness, focal weakness in extremities, nausea, chest pain, shortness of breath, and recent falls. He does not use aspirin or alcohol. There were no other concerns/complaints at this time.      ASSESSMENT           Discharge Assessment  How are you doing now that you are home?: Patient states he is feeling a little rough around the edges but symptoms are far better.  Pt states legs feel weak and not sleeping.  States weakness is getting " better. Patient states he has trouble staying asleep but has had problems with this in the past.  How are your symptoms? (Red Flag symptoms escalate to triage hotline per guidelines): Improved  Do you feel your condition is stable enough to be safe at home until your provider visit?: Yes  Does the patient have their discharge instructions? : Yes  Does the patient have questions regarding their discharge instructions? : No  Were you started on any new medications or were there changes to any of your previous medications? : Yes  Does the patient have all of their medications?: Yes  Do you have questions regarding any of your medications? : No  Do you have all of your needed medical supplies or equipment (DME)?  (i.e. oxygen tank, CPAP, cane, etc.): Yes  Discharge follow-up appointment scheduled within 14 calendar days? : Yes  Discharge Follow Up Appointment Date: 01/09/23  Discharge Follow Up Appointment Scheduled with?: Primary Care Provider         Post-op (Clinicians Only)  Did the patient have surgery or a procedure: No      PLAN                                                      Outpatient Plan:  Follow up with primary care provider, Lori Cerrato, in 1 week.      No future appointments.      For any urgent concerns, please contact our 24 hour nurse triage line: [phone] ([phone])         Ana Watson RN

## 2023-01-18 ENCOUNTER — HOSPITAL ENCOUNTER (OUTPATIENT)
Dept: CARDIOLOGY | Facility: HOSPITAL | Age: 73
Discharge: HOME | End: 2023-01-18
Attending: INTERNAL MEDICINE | Admitting: INTERNAL MEDICINE
Payer: COMMERCIAL

## 2023-01-18 DIAGNOSIS — G45.9 TIA (TRANSIENT ISCHEMIC ATTACK): ICD-10-CM

## 2023-01-18 PROCEDURE — 93270 REMOTE 30 DAY ECG REV/REPORT: CPT

## 2023-02-02 PROCEDURE — 93228 REMOTE 30 DAY ECG REV/REPORT: CPT | Performed by: INTERNAL MEDICINE

## 2023-02-05 NOTE — PROGRESS NOTES
Telephone call    Cardiac event recorder with no atrial fib seen.     Let message on home phone at 4:18 PM on 2/5/2023.    Marck Crockett MD  Pager [phone]  Cell [phone]

## 2024-06-28 ENCOUNTER — APPOINTMENT (OUTPATIENT)
Dept: CT IMAGING | Facility: HOSPITAL | Age: 74
End: 2024-06-28
Attending: FAMILY MEDICINE
Payer: COMMERCIAL

## 2024-06-28 ENCOUNTER — APPOINTMENT (OUTPATIENT)
Dept: MRI IMAGING | Facility: HOSPITAL | Age: 74
End: 2024-06-28
Payer: COMMERCIAL

## 2024-06-28 ENCOUNTER — HOSPITAL ENCOUNTER (OUTPATIENT)
Facility: HOSPITAL | Age: 74
Setting detail: OBSERVATION
Discharge: HOME | End: 2024-07-01
Attending: FAMILY MEDICINE | Admitting: FAMILY MEDICINE
Payer: COMMERCIAL

## 2024-06-28 DIAGNOSIS — G45.9 TIA (TRANSIENT ISCHEMIC ATTACK): ICD-10-CM

## 2024-06-28 DIAGNOSIS — R47.1 DYSARTHRIA: ICD-10-CM

## 2024-06-28 DIAGNOSIS — H53.2 DIPLOPIA: ICD-10-CM

## 2024-06-28 LAB
ANION GAP SERPL CALCULATED.3IONS-SCNC: 9 MMOL/L (ref 7–15)
APTT PPP: 29 SECONDS (ref 22–38)
BASOPHILS # BLD AUTO: 0.1 10E3/UL (ref 0–0.2)
BASOPHILS NFR BLD AUTO: 1 %
BUN SERPL-MCNC: 20.4 MG/DL (ref 8–23)
CALCIUM SERPL-MCNC: 8.3 MG/DL (ref 8.8–10.2)
CHLORIDE SERPL-SCNC: 100 MMOL/L (ref 98–107)
CHOLEST SERPL-MCNC: 102 MG/DL
CREAT SERPL-MCNC: 1.27 MG/DL (ref 0.67–1.17)
DEPRECATED HCO3 PLAS-SCNC: 27 MMOL/L (ref 22–29)
EGFRCR SERPLBLD CKD-EPI 2021: 60 ML/MIN/1.73M2
EOSINOPHIL # BLD AUTO: 0.5 10E3/UL (ref 0–0.7)
EOSINOPHIL NFR BLD AUTO: 7 %
ERYTHROCYTE [DISTWIDTH] IN BLOOD BY AUTOMATED COUNT: 11.7 % (ref 10–15)
GLUCOSE BLDC GLUCOMTR-MCNC: 143 MG/DL (ref 70–99)
GLUCOSE BLDC GLUCOMTR-MCNC: 82 MG/DL (ref 70–99)
GLUCOSE SERPL-MCNC: 293 MG/DL (ref 70–99)
HBA1C MFR BLD: 7.5 %
HCT VFR BLD AUTO: 31.7 % (ref 40–53)
HDLC SERPL-MCNC: 32 MG/DL
HGB BLD-MCNC: 10.3 G/DL (ref 13.3–17.7)
HOLD SPECIMEN: NORMAL
HOLD SPECIMEN: NORMAL
IMM GRANULOCYTES # BLD: 0 10E3/UL
IMM GRANULOCYTES NFR BLD: 0 %
INR PPP: 1.1 (ref 0.85–1.15)
LDLC SERPL CALC-MCNC: 52 MG/DL
LYMPHOCYTES # BLD AUTO: 1.6 10E3/UL (ref 0.8–5.3)
LYMPHOCYTES NFR BLD AUTO: 24 %
MCH RBC QN AUTO: 32.4 PG (ref 26.5–33)
MCHC RBC AUTO-ENTMCNC: 32.5 G/DL (ref 31.5–36.5)
MCV RBC AUTO: 100 FL (ref 78–100)
MONOCYTES # BLD AUTO: 0.4 10E3/UL (ref 0–1.3)
MONOCYTES NFR BLD AUTO: 6 %
NEUTROPHILS # BLD AUTO: 4.2 10E3/UL (ref 1.6–8.3)
NEUTROPHILS NFR BLD AUTO: 62 %
NONHDLC SERPL-MCNC: 70 MG/DL
NRBC # BLD AUTO: 0 10E3/UL
NRBC BLD AUTO-RTO: 0 /100
PLATELET # BLD AUTO: 231 10E3/UL (ref 150–450)
POTASSIUM SERPL-SCNC: 4.7 MMOL/L (ref 3.4–5.3)
RBC # BLD AUTO: 3.18 10E6/UL (ref 4.4–5.9)
SODIUM SERPL-SCNC: 136 MMOL/L (ref 135–145)
TRIGL SERPL-MCNC: 88 MG/DL
TROPONIN T SERPL HS-MCNC: 24 NG/L
TROPONIN T SERPL HS-MCNC: 25 NG/L
WBC # BLD AUTO: 6.8 10E3/UL (ref 4–11)

## 2024-06-28 PROCEDURE — 99285 EMERGENCY DEPT VISIT HI MDM: CPT | Mod: 25

## 2024-06-28 PROCEDURE — 80061 LIPID PANEL: CPT | Performed by: STUDENT IN AN ORGANIZED HEALTH CARE EDUCATION/TRAINING PROGRAM

## 2024-06-28 PROCEDURE — 36415 COLL VENOUS BLD VENIPUNCTURE: CPT | Performed by: FAMILY MEDICINE

## 2024-06-28 PROCEDURE — 250N000011 HC RX IP 250 OP 636: Performed by: FAMILY MEDICINE

## 2024-06-28 PROCEDURE — 99223 1ST HOSP IP/OBS HIGH 75: CPT | Mod: AI | Performed by: STUDENT IN AN ORGANIZED HEALTH CARE EDUCATION/TRAINING PROGRAM

## 2024-06-28 PROCEDURE — 85610 PROTHROMBIN TIME: CPT | Performed by: FAMILY MEDICINE

## 2024-06-28 PROCEDURE — 80048 BASIC METABOLIC PNL TOTAL CA: CPT | Performed by: FAMILY MEDICINE

## 2024-06-28 PROCEDURE — 96361 HYDRATE IV INFUSION ADD-ON: CPT

## 2024-06-28 PROCEDURE — A9585 GADOBUTROL INJECTION: HCPCS | Performed by: FAMILY MEDICINE

## 2024-06-28 PROCEDURE — G0378 HOSPITAL OBSERVATION PER HR: HCPCS

## 2024-06-28 PROCEDURE — 258N000003 HC RX IP 258 OP 636: Performed by: STUDENT IN AN ORGANIZED HEALTH CARE EDUCATION/TRAINING PROGRAM

## 2024-06-28 PROCEDURE — 36415 COLL VENOUS BLD VENIPUNCTURE: CPT | Performed by: STUDENT IN AN ORGANIZED HEALTH CARE EDUCATION/TRAINING PROGRAM

## 2024-06-28 PROCEDURE — 70496 CT ANGIOGRAPHY HEAD: CPT | Mod: MG

## 2024-06-28 PROCEDURE — 83036 HEMOGLOBIN GLYCOSYLATED A1C: CPT | Performed by: STUDENT IN AN ORGANIZED HEALTH CARE EDUCATION/TRAINING PROGRAM

## 2024-06-28 PROCEDURE — 255N000002 HC RX 255 OP 636: Performed by: FAMILY MEDICINE

## 2024-06-28 PROCEDURE — 84484 ASSAY OF TROPONIN QUANT: CPT | Performed by: FAMILY MEDICINE

## 2024-06-28 PROCEDURE — 84484 ASSAY OF TROPONIN QUANT: CPT | Mod: 91 | Performed by: STUDENT IN AN ORGANIZED HEALTH CARE EDUCATION/TRAINING PROGRAM

## 2024-06-28 PROCEDURE — 99207 PR APP CREDIT; MD BILLING SHARED VISIT: CPT | Performed by: STUDENT IN AN ORGANIZED HEALTH CARE EDUCATION/TRAINING PROGRAM

## 2024-06-28 PROCEDURE — 82962 GLUCOSE BLOOD TEST: CPT

## 2024-06-28 PROCEDURE — 99207 PR NO BILLABLE SERVICE THIS VISIT: CPT

## 2024-06-28 PROCEDURE — 93005 ELECTROCARDIOGRAM TRACING: CPT | Performed by: FAMILY MEDICINE

## 2024-06-28 PROCEDURE — 85730 THROMBOPLASTIN TIME PARTIAL: CPT | Performed by: FAMILY MEDICINE

## 2024-06-28 PROCEDURE — 70553 MRI BRAIN STEM W/O & W/DYE: CPT | Mod: MG

## 2024-06-28 PROCEDURE — 250N000013 HC RX MED GY IP 250 OP 250 PS 637: Performed by: FAMILY MEDICINE

## 2024-06-28 PROCEDURE — 250N000009 HC RX 250: Performed by: STUDENT IN AN ORGANIZED HEALTH CARE EDUCATION/TRAINING PROGRAM

## 2024-06-28 PROCEDURE — 85025 COMPLETE CBC W/AUTO DIFF WBC: CPT | Performed by: FAMILY MEDICINE

## 2024-06-28 PROCEDURE — 0042T CT HEAD PERFUSION W CONTRAST: CPT

## 2024-06-28 RX ORDER — DONEPEZIL HYDROCHLORIDE 5 MG/1
10 TABLET, FILM COATED ORAL DAILY
Status: DISCONTINUED | OUTPATIENT
Start: 2024-06-29 | End: 2024-07-01 | Stop reason: HOSPADM

## 2024-06-28 RX ORDER — MONTELUKAST SODIUM 10 MG/1
10 TABLET ORAL AT BEDTIME
Status: DISCONTINUED | OUTPATIENT
Start: 2024-06-28 | End: 2024-07-01 | Stop reason: HOSPADM

## 2024-06-28 RX ORDER — GABAPENTIN 300 MG/1
1200 CAPSULE ORAL 2 TIMES DAILY
Status: DISCONTINUED | OUTPATIENT
Start: 2024-06-28 | End: 2024-07-01 | Stop reason: HOSPADM

## 2024-06-28 RX ORDER — OLANZAPINE 5 MG/1
10 TABLET ORAL AT BEDTIME
Status: DISCONTINUED | OUTPATIENT
Start: 2024-06-28 | End: 2024-07-01 | Stop reason: HOSPADM

## 2024-06-28 RX ORDER — CLOPIDOGREL BISULFATE 75 MG/1
75 TABLET ORAL DAILY
Status: DISCONTINUED | OUTPATIENT
Start: 2024-06-29 | End: 2024-07-01 | Stop reason: HOSPADM

## 2024-06-28 RX ORDER — ARIPIPRAZOLE 10 MG/1
20 TABLET ORAL EVERY EVENING
Status: DISCONTINUED | OUTPATIENT
Start: 2024-06-28 | End: 2024-07-01 | Stop reason: HOSPADM

## 2024-06-28 RX ORDER — ONDANSETRON 2 MG/ML
4 INJECTION INTRAMUSCULAR; INTRAVENOUS EVERY 6 HOURS PRN
Status: DISCONTINUED | OUTPATIENT
Start: 2024-06-28 | End: 2024-07-01 | Stop reason: HOSPADM

## 2024-06-28 RX ORDER — LIDOCAINE 40 MG/G
CREAM TOPICAL
Status: DISCONTINUED | OUTPATIENT
Start: 2024-06-28 | End: 2024-07-01 | Stop reason: HOSPADM

## 2024-06-28 RX ORDER — BRIMONIDINE TARTRATE AND TIMOLOL MALEATE 2; 5 MG/ML; MG/ML
1 SOLUTION OPHTHALMIC 2 TIMES DAILY
Status: DISCONTINUED | OUTPATIENT
Start: 2024-06-28 | End: 2024-07-01 | Stop reason: HOSPADM

## 2024-06-28 RX ORDER — DONEPEZIL HYDROCHLORIDE 10 MG/1
10 TABLET, FILM COATED ORAL DAILY
COMMUNITY

## 2024-06-28 RX ORDER — ASPIRIN 81 MG/1
324 TABLET, CHEWABLE ORAL ONCE
Status: COMPLETED | OUTPATIENT
Start: 2024-06-28 | End: 2024-06-28

## 2024-06-28 RX ORDER — ESCITALOPRAM OXALATE 20 MG/1
20 TABLET ORAL EVERY EVENING
Status: DISCONTINUED | OUTPATIENT
Start: 2024-06-28 | End: 2024-07-01 | Stop reason: HOSPADM

## 2024-06-28 RX ORDER — DEXTROSE MONOHYDRATE 25 G/50ML
25-50 INJECTION, SOLUTION INTRAVENOUS
Status: DISCONTINUED | OUTPATIENT
Start: 2024-06-28 | End: 2024-07-01 | Stop reason: HOSPADM

## 2024-06-28 RX ORDER — DEXTROSE MONOHYDRATE AND SODIUM CHLORIDE 5; .9 G/100ML; G/100ML
INJECTION, SOLUTION INTRAVENOUS CONTINUOUS
Status: DISCONTINUED | OUTPATIENT
Start: 2024-06-28 | End: 2024-06-28 | Stop reason: ALTCHOICE

## 2024-06-28 RX ORDER — IOPAMIDOL 755 MG/ML
117 INJECTION, SOLUTION INTRAVASCULAR ONCE
Status: COMPLETED | OUTPATIENT
Start: 2024-06-28 | End: 2024-06-28

## 2024-06-28 RX ORDER — ASPIRIN 300 MG/1
300 SUPPOSITORY RECTAL ONCE
Status: COMPLETED | OUTPATIENT
Start: 2024-06-28 | End: 2024-06-28

## 2024-06-28 RX ORDER — ATORVASTATIN CALCIUM 10 MG/1
10 TABLET, FILM COATED ORAL EVERY EVENING
Status: DISCONTINUED | OUTPATIENT
Start: 2024-06-28 | End: 2024-07-01 | Stop reason: HOSPADM

## 2024-06-28 RX ORDER — ONDANSETRON 4 MG/1
4 TABLET, ORALLY DISINTEGRATING ORAL EVERY 6 HOURS PRN
Status: DISCONTINUED | OUTPATIENT
Start: 2024-06-28 | End: 2024-07-01 | Stop reason: HOSPADM

## 2024-06-28 RX ORDER — ALBUTEROL SULFATE 90 UG/1
2 AEROSOL, METERED RESPIRATORY (INHALATION) 2 TIMES DAILY PRN
Status: DISCONTINUED | OUTPATIENT
Start: 2024-06-28 | End: 2024-07-01 | Stop reason: HOSPADM

## 2024-06-28 RX ORDER — GADOBUTROL 604.72 MG/ML
9 INJECTION INTRAVENOUS ONCE
Status: COMPLETED | OUTPATIENT
Start: 2024-06-28 | End: 2024-06-28

## 2024-06-28 RX ORDER — NICOTINE POLACRILEX 4 MG
15-30 LOZENGE BUCCAL
Status: DISCONTINUED | OUTPATIENT
Start: 2024-06-28 | End: 2024-07-01 | Stop reason: HOSPADM

## 2024-06-28 RX ORDER — ASPIRIN 81 MG/1
81 TABLET ORAL DAILY
Status: DISCONTINUED | OUTPATIENT
Start: 2024-06-29 | End: 2024-07-01 | Stop reason: HOSPADM

## 2024-06-28 RX ORDER — CLOPIDOGREL BISULFATE 75 MG/1
75 TABLET ORAL DAILY
Status: DISCONTINUED | OUTPATIENT
Start: 2024-06-28 | End: 2024-06-28

## 2024-06-28 RX ORDER — CLOPIDOGREL BISULFATE 75 MG/1
300 TABLET ORAL ONCE
Status: COMPLETED | OUTPATIENT
Start: 2024-06-28 | End: 2024-06-28

## 2024-06-28 RX ORDER — TRAZODONE HYDROCHLORIDE 100 MG/1
300 TABLET ORAL AT BEDTIME
Status: DISCONTINUED | OUTPATIENT
Start: 2024-06-28 | End: 2024-07-01 | Stop reason: HOSPADM

## 2024-06-28 RX ORDER — SEMAGLUTIDE 1.34 MG/ML
2 INJECTION, SOLUTION SUBCUTANEOUS
COMMUNITY

## 2024-06-28 RX ORDER — LAMOTRIGINE 150 MG/1
300 TABLET ORAL EVERY EVENING
Status: DISCONTINUED | OUTPATIENT
Start: 2024-06-28 | End: 2024-07-01 | Stop reason: HOSPADM

## 2024-06-28 RX ORDER — SODIUM CHLORIDE 9 MG/ML
INJECTION, SOLUTION INTRAVENOUS CONTINUOUS
Status: DISCONTINUED | OUTPATIENT
Start: 2024-06-28 | End: 2024-06-29

## 2024-06-28 RX ADMIN — SODIUM CHLORIDE: 9 INJECTION, SOLUTION INTRAVENOUS at 22:57

## 2024-06-28 RX ADMIN — IOPAMIDOL 117 ML: 755 INJECTION, SOLUTION INTRAVENOUS at 14:20

## 2024-06-28 RX ADMIN — ASPIRIN 81 MG CHEWABLE TABLET 324 MG: 81 TABLET CHEWABLE at 17:21

## 2024-06-28 RX ADMIN — GADOBUTROL 9 ML: 604.72 INJECTION INTRAVENOUS at 16:11

## 2024-06-28 RX ADMIN — BRIMONIDINE TARTRATE, TIMOLOL MALEATE 1 DROP: 2; 5 SOLUTION/ DROPS OPHTHALMIC at 22:02

## 2024-06-28 RX ADMIN — DEXTROSE AND SODIUM CHLORIDE: 5; 900 INJECTION, SOLUTION INTRAVENOUS at 20:15

## 2024-06-28 ASSESSMENT — ACTIVITIES OF DAILY LIVING (ADL)
ADLS_ACUITY_SCORE: 39
ADLS_ACUITY_SCORE: 38
ADLS_ACUITY_SCORE: 39
ADLS_ACUITY_SCORE: 38

## 2024-06-28 ASSESSMENT — ENCOUNTER SYMPTOMS
WEAKNESS: 1
VOMITING: 0
NAUSEA: 0
NUMBNESS: 0
DIARRHEA: 0
DIZZINESS: 1

## 2024-06-28 ASSESSMENT — COLUMBIA-SUICIDE SEVERITY RATING SCALE - C-SSRS
1. IN THE PAST MONTH, HAVE YOU WISHED YOU WERE DEAD OR WISHED YOU COULD GO TO SLEEP AND NOT WAKE UP?: NO
2. HAVE YOU ACTUALLY HAD ANY THOUGHTS OF KILLING YOURSELF IN THE PAST MONTH?: NO
6. HAVE YOU EVER DONE ANYTHING, STARTED TO DO ANYTHING, OR PREPARED TO DO ANYTHING TO END YOUR LIFE?: NO

## 2024-06-28 NOTE — ED NOTES
[facility] ED Handoff Report    ED Diagnosis:  (H53.2) Diplopia    (R47.1) Dysarthria    (G45.9) Hx of TIA (transient ischemic attack)    PMH:    Past Medical History:   Diagnosis Date    Anxiety 04/24/2020    Asthma exacerbation 09/11/2017    Bipolar I disorder (H) 04/24/2020    Controlled type 2 diabetes mellitus, without long-term current use of insulin (H) 09/11/2017    Mixed hyperlipidemia     Recurrent major depressive disorder (H24) 09/11/2017    Respiratory insufficiency 09/11/2017        Code Status:  Prior     Falls Risk: Yes Band: Applied    Current Living Situation/Residence: lives with a significant other     Elimination Status: Continent: Yes     Activity Level: SBA w/ walker    Patients Preferred Language:  English     Needed: No    Vital Signs:  BP (!) 187/90   Pulse 78   Temp 98.2  F (36.8  C)   Resp 18   SpO2 94%      Cardiac Rhythm: NSR    Pain Score: 0/10    Is the Patient Confused:  No    Last Food or Drink: 06/28/24 at 17:32    Focused Assessment: Pt arrived earlier today via ambulance c/o weakness, unsteady gait, dizziness and double vision that started around 900 am. Tier 2 stroke code was called and was later de-escalated. The double vision did resolve, however wife still notices some word find and slurred speech. There is still some drooping rt side of face. Pt is alert and oriented.    Tests Performed: Done: Labs and Imaging    Treatments Provided:  po asprin    Family Dynamics/Concerns: No    Family Updated On Visitor Policy: Yes    Plan of Care Communicated to Family: Yes    Who Was Updated about Plan of Care: Wife    Belongings Checklist Done and Signed by Patient: Yes    Belongings Sent with Patient: Yes    Medications sent with patient: No    Covid: asymptomatic , N/A    Additional Information: Pt was able to chew 4 baby aspirins slowly with 3 sips of water. He does need further evaluation of his swallowing.    RN: Denice Ellison RN   6/28/2024 5:29 PM

## 2024-06-28 NOTE — CONSULTS
"  [facility]    Stroke Telephone Note    I was called by Dr. Salazar Moran on 06/28/24 regarding patient Montez Cabrera. The patient is a 73 year old male with a PMH significant for anxiety, Biploar disorder, T2DM, HLD, Depression, hx of TIA (2021). History obtained from ED provider. Today Montez presents with new unsteady gait, dizziness, and double vision that he first noticed at 0900 am. LKW was immediately prior/at 0900 am. On ED providers examination, Montez has left facial droop, L arm ataxia, L leg weakness. Montez also reports double vision with rightward and leftward gaze but worse with rightward. Presenting BP was 158/73. On aspirin 81 mg and Lipitor 10 mg PTA.     Vitals  BP: (!) 158/73   Pulse: 83   Resp: 20   Temp: 98.2  F (36.8  C)        Stroke Code Data (for stroke code without tele)  Stroke code activated 06/28/24  1400   Stroke provider first response 06/28/24  1401   Last known normal 06/28/24  0900      Time of discovery (or onset of symptoms) 06/28/24  0900   Head CT read by Stroke Neuro Provider 06/28/24  1409   Was stroke code de-escalated? Yes  06/28/24  1431     Imaging Findings  CT head: No evidence of hemorrhage.   CTA head/neck: No large vessel occlusion or significant stenosis.   CT Perfusion: Normal    Intravenous Thrombolysis  Not given due to:   - unclear or unfavorable risk-benefit profile for extended window thrombolysis beyond the conventional 4.5 hour time window    Endovascular Treatment  Not initiated due to absence of proximal vessel occlusion    Impression  Left facial droop, left arm ataxia, left leg weakness, diplopia concern for brainstem vs right hemiparetic infarct     Recommendations   - Use orderset: \"Ischemic Stroke Routine Admission\" or \"Ischemic Stroke No Thrombolytics/No Thrombectomy ICU Admission\"  - Place Neurology IP Stroke Consult order   - Neurochecks and Vital Signs every 4 hours   - Permissive HTN; goal SBP < 220 mmHg  - Aspirin " "300 mg suppository x 1 dose  - Nutrition: Will need SLP evaluation given L facial droop before receiving oral medications   - If cleared by SLP can have Plavix (clopidogrel) 300 mg PO loading dose x 1  - Statin: continue PTA Lipitor 10 mg, may adjust base on LDL panel   - MRI Brain with and without contrast with thin cuts through the brainstem and coronal DWI  - TTE (with Bubble Study if age 60 yrs or less)  - Telemetry, EKG  - Bedside Glucose Monitoring  - A1c, Lipid Panel, Troponin x 3  - PT/OT/SLP  - Stroke Education  - Euthermia, Euglycemia    Case discussed with vascular neurology attending Dr. You.    My recommendations are based on the information provided over the phone by Montez Cabrera's in-person providers. They are not intended to replace the clinical judgment of his in-person providers. I was not requested to personally see or examine the patient at this time.     Silvia Adams PA-C  Vascular Neurology    To page me or covering stroke neurology team member, click here: AMCOM  Choose \"On Call\" tab at top, then select \"NEUROLOGY/ALL SITES\" from middle drop-down box, press Enter, then look for \"stroke\" or \"telestroke\" for your site.   "

## 2024-06-28 NOTE — H&P
[facility]    History and Physical - Hospitalist Service       Date of Admission:  6/28/2024    Assessment & Plan      Montez Cabrera is a 73 year old male with PMHx significant for T2DM, HLD, Depression, hx of TIA (2021), anxiety, Biploar disorder.  He presented to [facility] ER 6/28/2024 for evaluation of new onset unsteady gait, dizziness, and diplopia this morning.  Seen by neurology in the ER.  Patient was admitted for observation of possible TIA versus CVA.      # CVA/TIA workup  # Facial droop, slurred speech, L leg weakness   # Hx of TIA (2021) w/ residual, mild R facial droop  The morning of 6/20 patient suddenly developed left arm ataxia, left leg weakness, disequilibrium, double vision, and slurred speech with mild lower facial droop at this lip.  He does have a mild right facial droop at baseline.  CT, CTA, and MRI brain all without acute findings.  On my exam this afternoon all patient's symptoms persist.  -Appreciate input from neurology, plan as follows:  - Neurochecks and Vital Signs every 4 hours   - Permissive HTN; goal SBP < 220 mmHg  - Aspirin 300 mg suppository x 1 dose (given)    - Restart PTA ASA 81 mg daily tomorrow   - SLP consulted to clear for PO intake given L facial droop   - If cleared by SLP can have Plavix (clopidogrel) 300 mg PO loading dose x 1  - Statin: continue PTA Lipitor 10 mg, may adjust base on LDL panel   - TTE (ordered)  - Telemetry, EKG  - Bedside Glucose Monitoring  - A1c, Lipid Panel, Troponin x 3  - PT/OT/SLP  - Stroke Education  - Euthermia, Euglycemia    Update 2000  - Speech unable to see pt tonight   - Continue NPO status  - Started D5NS at 75 ml/hr     #DM type II  Hgb A1C 7.5. Home regimen includes Lantus 25 units every morning, metformin 500 mg twice daily, Ozempic 2 mg every Sunday.  - Hold Lantuss until tolerating PO/cleared by SLP.    - Medium intensity sliding scale   - Hold PTA Metformin. Give Ozempic if still here on Sunday  - QID  BG checks, hypoglycemia protocol     # Dementia   Somewhat recent diagnosis per patient's wife.  Per patient's wife, he has difficulty with short-term memory and she would like to be around for any conversations or at least see daily updates.  - PTA Aricept  -Please update pt's wife daily     #Parotid mass, incidental finding  CTA of the head noted a 2.1 cm left parotid tail mass mildly enlarged versus 2022.  - ENT referral recommended    # Asthma   Tolerating RA but has mild wheezing on auscultation.  - Continue PTA inhalers, Singulair    #Depression  #Bipolar disorder  -PTA Effexor 225 mg daily, Abilify 20 mg nightly, Lexapro 20 mg nightly, Zyprexa 10 mg nightly, and trazodone 300 mg nightly  -PTA gabapentin 1200 mg twice daily  -PTA lamotrigine 300 mg every evening       #Cataracts  - Continue PTA eyedrops          Diet: NPO for Medical/Clinical Reasons Except for: No Exceptions    DVT Prophylaxis: Pneumatic Compression Devices  Castillo Catheter: Not present  Lines: None     Cardiac Monitoring: ACTIVE order. Indication: Stroke, acute (48 hours)  Code Status: Full Code     Clinically Significant Risk Factors Present on Admission                # Drug Induced Platelet Defect: home medication list includes an antiplatelet medication   # Hypertension: Noted on problem list    # Anemia: based on hgb <11          # DMII: A1C = 7.5 % (Ref range: <5.7 %) within past 6 months        # Asthma: noted on problem list        Disposition Plan      Expected Discharge Date: 06/29/2024                The patient's care was discussed with the Attending Physician, Bedside Nurse, Patient, Patient's Family, and stroke neurology Consultant(s).    Nickolas Martinez PA-C  Hospitalist Service  [facility]  Securely message with [employer] (more info)  Text page via [facility] Paging/Directory     ______________________________________________________________________    Chief Complaint   Disequilibrium, left-sided weakness,  facial droop slurred speech    History is obtained from the patient, chart review, patient's family    History of Present Illness   Montez Cabrera is a 73 year old male with PMHx significant for T2DM, HLD, Depression, hx of TIA (2021), anxiety, Biploar disorder.  He presented to [facility] ER 6/28/2024 for evaluation of new onset unsteady gait, dizziness, and diplopia this morning.  Seen by neurology in the ER.  CT, CTA, and MRI of the brain were all unrevealing for acute process.  Patient was admitted for observation of possible TIA versus CVA.    Patient seen with wife at bedside this afternoon.  They report that around 09 100 this morning patient suddenly developed gait instability, double vision, slurred speech, and slight left facial droop.  Does report dizziness.  Patient reports he feels weak in both legs but more pronounced on his left.  He has some abnormal sensation in both his left and right hand but no weakness to either arm.  He denies chest pain or shortness of breath.    Per patient's wife, patient somewhat recently diagnosed with dementia.  She reports he has short-term memory impairment and would appreciate daily updates.    Review of Systems    The 10 point Review of Systems is negative other than noted in the HPI or here.     Past Medical History    I have reviewed this patient's medical history and updated it with pertinent information if needed.   Past Medical History:   Diagnosis Date    Anxiety 04/24/2020    Asthma exacerbation 09/11/2017    Bipolar I disorder (H) 04/24/2020    Controlled type 2 diabetes mellitus, without long-term current use of insulin (H) 09/11/2017    Mixed hyperlipidemia     Recurrent major depressive disorder (H24) 09/11/2017    Respiratory insufficiency 09/11/2017       Past Surgical History   I have reviewed this patient's surgical history and updated it with pertinent information if needed.  Past Surgical History:   Procedure Laterality Date    FOOT SURGERY       OTHER SURGICAL HISTORY      ARM SURGERY       Social History   I have reviewed this patient's social history and updated it with pertinent information if needed.  Social History     Tobacco Use    Smoking status: Former     Current packs/day: 0.00     Types: Cigarettes     Quit date: 1977     Years since quittin.8    Smokeless tobacco: Never   Substance Use Topics    Alcohol use: No     Comment: Alcoholic Drinks/day: quit years ago    Drug use: No       Family History         Prior to Admission Medications   Prior to Admission Medications   Prescriptions Last Dose Informant Patient Reported? Taking?   ARIPiprazole (ABILIFY) 20 MG tablet 2024 at pm  Yes Yes   Sig: Take 20 mg by mouth every evening   OLANZapine (ZYPREXA) 10 MG tablet 2024 at pm  Yes Yes   Sig: Take 10 mg by mouth At Bedtime   albuterol (PROAIR HFA/PROVENTIL HFA/VENTOLIN HFA) 108 (90 Base) MCG/ACT inhaler Unknown at prn  Yes Yes   Sig: Inhale 2 puffs into the lungs 2 times daily as needed for shortness of breath / dyspnea or wheezing   aspirin (ASA) 81 MG EC tablet 2024 at am  No Yes   Sig: Take 1 tablet (81 mg) by mouth daily   atorvastatin (LIPITOR) 10 MG tablet 2024 at pm  Yes Yes   Sig: Take 10 mg by mouth every evening   brimonidine-timolol (COMBIGAN) 0.2-0.5 % ophthalmic solution 2024 at am  Yes Yes   Sig: Place 1 drop into both eyes 2 times daily   clopidogrel (PLAVIX) 75 MG tablet 2024 at am  No Yes   Sig: Take 1 tablet (75 mg) by mouth daily for 30 days   donepezil (ARICEPT) 10 MG tablet 2024 at am  Yes Yes   Sig: Take 10 mg by mouth daily   escitalopram (LEXAPRO) 20 MG tablet 2024 at pm  Yes Yes   Sig: Take 20 mg by mouth every evening   gabapentin (NEURONTIN) 400 MG capsule 2024 at am  Yes Yes   Sig: Take 1,200 mg by mouth 2 times daily   insulin glargine (LANTUS PEN) 100 UNIT/ML pen 2024 at am  Yes Yes   Sig: Inject 25 Units Subcutaneous every morning   lamoTRIgine (LAMICTAL) 100  MG tablet 6/27/2024 at pm  Yes Yes   Sig: Take 300 mg by mouth every evening   metFORMIN (GLUCOPHAGE-XR) 500 MG 24 hr tablet 6/28/2024 at am  Yes Yes   Sig: Take 500 mg by mouth 2 times daily (with meals)   montelukast (SINGULAIR) 10 MG tablet 6/27/2024 at pm  Yes Yes   Sig: Take 10 mg by mouth At Bedtime   semaglutide (OZEMPIC, 0.25 OR 0.5 MG/DOSE,) 2 MG/1.5ML SOPN pen 6/23/2024 at Sunday evenings  Yes Yes   Sig: Inject 2 mg Subcutaneous every 7 days   traZODone (DESYREL) 150 MG tablet 6/27/2024 at pm  Yes Yes   Sig: Take 300 mg by mouth At Bedtime   venlafaxine (EFFEXOR-XR) 75 MG 24 hr capsule 6/28/2024 at am  Yes Yes   Sig: Take 225 mg by mouth daily      Facility-Administered Medications: None     Allergies   Allergies   Allergen Reactions    Bupropion Diarrhea    Erythromycin Diarrhea    Theophylline GI Disturbance       Physical Exam   Vital Signs: Temp: 98.2  F (36.8  C)   BP: (!) 187/90 Pulse: 78   Resp: 18 SpO2: 94 %      Weight: 0 lbs 0 oz    Constitutional: awake, alert, cooperative, in no acute distress. A&Ox3. Pleasant. Mildly forgetful, drifting off at times   Eyes: EOM, PERRLA. Sclera clear, conjunctiva normal. Anicteric   HENT: Normocephalic, without obvious abnormality, atraumatic.   Respiratory: No increased work of breathing. Has some wheezing on auscultation.   GI: Soft, non-tender without rebound or guarding.   Neurologic: Has chronic R facial droop around his R eye. New Left, lower facial droop at his lip. + Slurred speech. Remainder of CN grossly intact. UE strength: 5/5 in biceps, triceps, wrists, and  strength. LE Strength: 4/5 strength to L hip flexors, dorsiflexion, and plantar flexion. 5/5 strength on the right      Data   Data reviewed today: I reviewed all medications, new labs and imaging results over the last 24 hours. See A/P for discussion on these results.     Recent Labs   Lab 06/28/24  1428   WBC 6.8   HGB 10.3*         INR 1.10      POTASSIUM 4.7    CHLORIDE 100   CO2 27   BUN 20.4   CR 1.27*   ANIONGAP 9   JAYME 8.3*   *       Recent Results (from the past 24 hour(s))   CTA Head Neck with Contrast    Narrative    HEAD AND NECK CT ANGIOGRAM WITH IV CONTRAST  [facility]  6/28/2024 2:19 PM CDT    INDICATION: Code Stroke to evaluate for potential thrombolysis and thrombectomy. PLEASE READ IMMEDIATELY. Weakness, unsteady gait and dizziness.  TECHNIQUE: Head and neck CT angiogram with IV contrast. Noncontrast head CT followed by axial helical CT images of the head and neck vessels obtained during the arterial phase of intravenous contrast administration. Axial helical 2D reconstructed images   and multiplanar 3D MIP reconstructed images of the head and neck vessels were performed by the technologist. Dose reduction techniques were used. Vessel stenoses reported according to NASCET criteria.  CONTRAST: isovue 370 117ml  COMPARISON: 12/30/2022.    FINDINGS:  NONCONTRAST HEAD CT: No intracranial hemorrhage, extraaxial collection, mass effect or CT evidence of acute infarct.  Mild presumed chronic small vessel ischemic changes. Mild generalized volume loss. The ventricles are proportional to the sulci. Osseous   structures are intact. The visualized paranasal sinuses are free of significant disease. The mastoid air cells are free of significant disease. The orbits are unremarkable.    HEAD CTA: Minor intracranial atherosclerosis predominantly affecting the carotid siphons. No large vessel occlusion, aneurysm or arteriovenous malformation. Patent dural venous sinuses.    NECK CTA: Three vessel arch.  Carotid arteries are patent without atherosclerotic change.  No hemodynamically significant stenosis by NASCET criteria in either carotid system.  Patent vertebral arteries. No dissection. 2 x 1.6 x 2.1 cm soft tissue mass   within the left parotid tail has enlarged mildly versus 2022.      Impression    CONCLUSION:  HEAD CT:  No intracranial  hemorrhage, mass, or definite CT evidence of recent ischemia.    HEAD CTA:  Minor intracranial atherosclerosis. No vessel stenosis, occlusion or aneurysm.    NECK CTA:  1.  No dissection or hemodynamically significant narrowing in the neck by NASCET criteria.  2.  2.1 cm left parotid tail mass has enlarged mildly versus 2022. 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 ENT referral recommended.    Findings were discussed with Dr. REJI SINGH via telephone at 1426 hours on 6/28/2024.     CT Head Perfusion w Contrast - For Tier 2 Stroke    Narrative    EXAM: CT HEAD PERFUSION W CONTRAST  LOCATION: [facility]  DATE/TIME: 6/28/2024 2:20 PM CDT    INDICATION: Weakness, unsteady gait and dizziness.  TECHNIQUE: CT cerebral perfusion was performed utilizing a contrast bolus. Perfusion data were post processed with generation of standard perfusion maps and estimation of ischemic/infarcted volumes utilizing standard threshold values. Dose reduction   techniques were used.   CONTRAST: isovue 370 117ml  COMPARISON: Head and neck CTA 6/28/2024    FINDINGS:   PERFUSION MAPS: Symmetrical cerebral perfusion. No focal deficits in cerebral blood flow or volume to suggest ischemia/oligemia.    RAPID ANALYSIS:  CBF<30%: 0 mL  Tmax>6sec: 0 mL  Mismatch volume: 0 mL  Mismatch ratio: none      Impression    IMPRESSION:   1.  Normal cerebral perfusion.   MR Brain w/o & w Contrast    Narrative    EXAM: MR BRAIN W/O and W CONTRAST  LOCATION: [facility]  DATE: 6/28/2024    INDICATION: Left facial droop, left arm ataxia, left leg weakness, diplopia please add thincuts through the brainstem and coronal DWI  COMPARISON: CTA head and neck 6/28/2024, MRI brain 1/4/2023  CONTRAST: 9 ml gadavist  TECHNIQUE: Routine multiplanar multisequence head MRI without and with intravenous contrast.    FINDINGS:  INTRACRANIAL CONTENTS: No  acute or subacute infarct. No mass, acute hemorrhage, or extra-axial fluid collections. Scattered nonspecific T2/FLAIR hyperintensities within the cerebral white matter most consistent with mild chronic microvascular ischemic   change. Mild generalized cerebral atrophy. No hydrocephalus. Normal position of the cerebellar tonsils. No pathologic contrast enhancement.    SELLA: No abnormality accounting for technique.    OSSEOUS STRUCTURES/SOFT TISSUES: Normal marrow signal. The major intracranial vascular flow voids are maintained. Left parotid tail mass seen on CTA head is only partially visualized.     ORBITS: Prior bilateral cataract surgery. Visualized portions of the orbits are otherwise unremarkable.     SINUSES/MASTOIDS: No paranasal sinus mucosal disease. No middle ear or mastoid effusion.       Impression    IMPRESSION:  1.  No acute infarct, intracranial hemorrhage, or intracranial mass.  2.  Mild age-related changes.  3.  Left parotid tail mass seen on CTA head is only partially visualized.

## 2024-06-28 NOTE — ED PROVIDER NOTES
EMERGENCY DEPARTMENT ENCOUNTER      NAME: Montez Cabrera  AGE: 73 year old male  YOB: 1950  MRN: [id number]  EVALUATION DATE & TIME: 6/28/2024  1:50 PM    PCP: Lori Cerrato    ED PROVIDER: Salazar Moran M.D.    Chief Complaint   Patient presents with    Generalized Weakness    Eye Problem       FINAL IMPRESSION:  1. Diplopia    2. Dysarthria    3. Hx of TIA (transient ischemic attack)        ED COURSE & MEDICAL DECISION MAKING:    Pertinent Labs & Imaging studies independently interpreted by me. (See chart for details)  Reviewed prior emergency department visit and admission from 1/4/2023 which was for dysequilibrium and TIA.    ED Course as of 06/29/24 1221   Fri Jun 28, 2024   1400 Patient seen and examined, presents with what is initially described as generalized weakness but on further interview is disequilibrium and feeling off balance with walking, double vision started about 9 AM this morning.  On my exam, patient has right eyelid droop which she says is old, left lower facial droop which he has not noticed previously.  He has ataxia of the left arm and question of some weakness of the left leg although this improved with repeated testing.  Tier 2 stroke activated, patient is not a lytic candidate due to time since onset of symptoms.   1406 Care discussed with Silvia from stroke team who will follow along.   1425 CT and CTA negative for acute findings.   1431 Stroke code de-escalated, plan for aspirin AL and admission.   1634 Reviewed radiology interpretation, MRI is negative.   1650 Updated patient with findings and plan, spouse is at the bedside and she says his face does look a little bit off, also notes that he is having some slurred speech intermittently.   1709 Patient refuses aspirin suppository, will be given chewable aspirin.   1719 Care discussed with Nickolas hospitalist for admission.     EKG:    Independently reviewed and interpreted by me  Performed at: 2:43  PM  Impression: Nonspecific ST changes  Rate: 79  Rhythm: Sinus  Axis: Normal  NJ Interval: 166  QRS Interval: 88  QTc Interval: 428  ST Changes: No acute ischemic changes, nonspecific ST changes  Comparison: January 2023, no change      At the conclusion of the encounter I discussed the results of all of the tests and the disposition. The questions were answered. The patient or family acknowledged understanding and was agreeable with the care plan.     Medical Decision Making  Obtained supplemental history:Supplemental history obtained?: No  Reviewed external records: External records reviewed?: Documented in chart  Care impacted by chronic illness:Diabetes, Hyperlipidemia, Hypertension, and Mental Health  Care significantly affected by social determinants of health:N/A  Did you consider but not order tests?: Work up considered but not performed and documented in chart, if applicable  Did you interpret images independently?: Independent interpretation of ECG and images noted in documentation, when applicable.  Consultation discussion with other provider:Did you involve another provider (consultant, MH, pharmacy, etc.)?: I discussed the care with another health care provider, see documentation for details.  Admit.    MEDICATIONS GIVEN IN THE EMERGENCY:  Medications   albuterol (PROVENTIL HFA/VENTOLIN HFA) inhaler (has no administration in time range)   ARIPiprazole (ABILIFY) tablet 20 mg (20 mg Oral Not Given 6/28/24 2000)   aspirin EC tablet 81 mg (81 mg Oral $Given 6/29/24 1005)   atorvastatin (LIPITOR) tablet 10 mg (10 mg Oral Not Given 6/28/24 2001)   brimonidine-timolol (COMBIGAN) 0.2-0.5 % ophthalmic solution 1 drop (1 drop Both Eyes Not Given 6/29/24 1011)   donepezil (ARICEPT) tablet 10 mg (10 mg Oral $Given 6/29/24 1008)   escitalopram (LEXAPRO) tablet 20 mg (20 mg Oral Not Given 6/28/24 2001)   gabapentin (NEURONTIN) capsule 1,200 mg (1,200 mg Oral $Given 6/29/24 1007)   insulin glargine (LANTUS PEN)  injection 25 Units ( Subcutaneous Unheld by provider 6/29/24 1134)   lamoTRIgine (LaMICtal) tablet 300 mg (300 mg Oral Not Given 6/28/24 2003)   montelukast (SINGULAIR) tablet 10 mg (10 mg Oral Not Given 6/28/24 2108)   OLANZapine (zyPREXA) tablet 10 mg (10 mg Oral Not Given 6/28/24 2109)   traZODone (DESYREL) tablet 300 mg (300 mg Oral Not Given 6/28/24 2109)   venlafaxine (EFFEXOR XR) 24 hr capsule 225 mg (225 mg Oral $Given 6/29/24 1005)   lidocaine 1 % 0.1-1 mL (has no administration in time range)   lidocaine (LMX4) cream (has no administration in time range)   sodium chloride (PF) 0.9% PF flush 3 mL (3 mLs Intracatheter $Given 6/29/24 1009)   sodium chloride (PF) 0.9% PF flush 3 mL (has no administration in time range)   clopidogrel (PLAVIX) tablet 300 mg (300 mg Oral or NG Tube Not Given 6/28/24 1909)     Followed by   clopidogrel (PLAVIX) tablet 75 mg (75 mg Oral or NG Tube $Given 6/29/24 1008)   ondansetron (ZOFRAN ODT) ODT tab 4 mg (has no administration in time range)     Or   ondansetron (ZOFRAN) injection 4 mg (has no administration in time range)   glucose gel 15-30 g (has no administration in time range)     Or   dextrose 50 % injection 25-50 mL (has no administration in time range)     Or   glucagon injection 1 mg (has no administration in time range)   insulin aspart (NovoLOG) injection (RAPID ACTING) ( Subcutaneous Not Given 6/29/24 1008)   insulin aspart (NovoLOG) injection (RAPID ACTING) ( Subcutaneous Not Given 6/28/24 2209)   iopamidol (ISOVUE-370) solution 117 mL (117 mLs Intravenous $Given 6/28/24 1420)     And   sodium chloride 0.9 % bag for CT scan flush use (100 mLs As instructed Not Given 6/29/24 6564)   aspirin (ASA) Suppository 300 mg (300 mg Rectal Not Given 6/28/24 1659)   gadobutrol (GADAVIST) injection 9 mL (9 mLs Intravenous $Given 6/28/24 1611)   aspirin (ASA) chewable tablet 324 mg (324 mg Oral $Given 6/28/24 1721)   perflutren lipid microsphere (DEFINITY) injection SUSP 2 mL (2  mLs Intravenous $Given 6/29/24 1014)       NEW PRESCRIPTIONS STARTED AT TODAY'S ER VISIT  Current Discharge Medication List          =================================================================    HPI    Patient information was obtained from: patient       Montez Cabrera is a 73 year old male with a pertinent history of TIA, diabetes mellitus type 2, hypertension, hyperlipidemia, who presents to this ED via EMS for evaluation of generalized weakness.    At 9 AM today, the patient was already awake when he began experiencing weakness, dizziness, and unsteadiness, along with double vision. He called EMS was was brought to the ER. He denies numbness, tingling, nausea, vomiting, diarrhea, and recent illness.    The patient has had 2 previous TIAs with no residual deficits. His right eye droops at baseline. He believes he is on blood thinners.      REVIEW OF SYSTEMS   Review of Systems   Eyes:         Positive for double vision.   Gastrointestinal:  Negative for diarrhea, nausea and vomiting.   Neurological:  Positive for dizziness and weakness. Negative for numbness.      All other systems reviewed and negative    PAST MEDICAL HISTORY:  Past Medical History:   Diagnosis Date    Anxiety 04/24/2020    Asthma exacerbation 09/11/2017    Bipolar I disorder (H) 04/24/2020    Controlled type 2 diabetes mellitus, without long-term current use of insulin (H) 09/11/2017    Mixed hyperlipidemia     Recurrent major depressive disorder (H24) 09/11/2017    Respiratory insufficiency 09/11/2017       PAST SURGICAL HISTORY:  Past Surgical History:   Procedure Laterality Date    FOOT SURGERY      OTHER SURGICAL HISTORY      ARM SURGERY       CURRENT MEDICATIONS:    Current Facility-Administered Medications   Medication Dose Route Frequency Provider Last Rate Last Admin    albuterol (PROVENTIL HFA/VENTOLIN HFA) inhaler  2 puff Inhalation BID PRN Nickolas Martinez PA-C        ARIPiprazole (ABILIFY) tablet 20 mg  20 mg Oral QPM  Nickolas Martinez PA-C        aspirin EC tablet 81 mg  81 mg Oral Daily Nickolas Martinez PA-C   81 mg at 06/29/24 1005    atorvastatin (LIPITOR) tablet 10 mg  10 mg Oral QPM Nickolas Martinez PA-C        brimonidine-timolol (COMBIGAN) 0.2-0.5 % ophthalmic solution 1 drop  1 drop Both Eyes BID Nickolas Martinez PA-C   1 drop at 06/28/24 2202    clopidogrel (PLAVIX) tablet 75 mg  75 mg Oral or NG Tube Daily Nickolas Martinez PA-C   75 mg at 06/29/24 1008    glucose gel 15-30 g  15-30 g Oral Q15 Min PRN Nickolas Martinez PA-C        Or    dextrose 50 % injection 25-50 mL  25-50 mL Intravenous Q15 Min PRN Nickolas Martinez PA-C        Or    glucagon injection 1 mg  1 mg Subcutaneous Q15 Min PRN Nickolas Martinez PA-C        donepezil (ARICEPT) tablet 10 mg  10 mg Oral Daily Nickolas Martinez PA-C   10 mg at 06/29/24 1008    escitalopram (LEXAPRO) tablet 20 mg  20 mg Oral QPM Nickolas Martinez PA-C        gabapentin (NEURONTIN) capsule 1,200 mg  1,200 mg Oral BID Nickolas Martinez PA-C   1,200 mg at 06/29/24 1007    insulin aspart (NovoLOG) injection (RAPID ACTING)  1-7 Units Subcutaneous TID AC Nickolas Martinez PA-C        insulin aspart (NovoLOG) injection (RAPID ACTING)  1-5 Units Subcutaneous At Bedtime Nickolas Martinez PA-C        insulin glargine (LANTUS PEN) injection 25 Units  25 Units Subcutaneous Darren Hyman MD        lamoTRIgine (LaMICtal) tablet 300 mg  300 mg Oral QPM Nickolas Martinez PA-C        lidocaine (LMX4) cream   Topical Q1H PRN Nickolas Martinez PA-C        lidocaine 1 % 0.1-1 mL  0.1-1 mL Other Q1H PRN Nickolas Martinez PA-C        montelukast (SINGULAIR) tablet 10 mg  10 mg Oral At Bedtime Nickolas Martinez PA-C        OLANZapine (zyPREXA) tablet 10 mg  10 mg Oral At Bedtime Nickolas Martinez PA-C        ondansetron (ZOFRAN ODT) ODT tab 4 mg  4 mg Oral Q6H PRN Nickolas Martinez PA-C        Or    ondansetron (ZOFRAN) injection 4 mg  4 mg Intravenous Q6H PRN Nickolas Martinez PA-C        sodium chloride  (PF) 0.9% PF flush 3 mL  3 mL Intracatheter Q8H Nickolas Martinez PA-C   3 mL at 24 1009    sodium chloride (PF) 0.9% PF flush 3 mL  3 mL Intracatheter q1 min prn Nickolas Martinez PA-C        traZODone (DESYREL) tablet 300 mg  300 mg Oral At Bedtime Nickolas Martinez PA-C        venlafaxine (EFFEXOR XR) 24 hr capsule 225 mg  225 mg Oral Daily Nickolas Martinez PA-C   225 mg at 24 1005       ALLERGIES:  Allergies   Allergen Reactions    Bupropion Diarrhea    Erythromycin Diarrhea    Theophylline GI Disturbance       FAMILY HISTORY:  History reviewed. No pertinent family history.    SOCIAL HISTORY:   Social History     Socioeconomic History    Marital status:    Tobacco Use    Smoking status: Former     Current packs/day: 0.00     Types: Cigarettes     Quit date: 1977     Years since quittin.8    Smokeless tobacco: Never   Substance and Sexual Activity    Alcohol use: No     Comment: Alcoholic Drinks/day: quit years ago    Drug use: No   Social History Narrative    retired     Social Determinants of Health     Financial Resource Strain: Low Risk  (2023)    Received from [facility]    Financial Resource Strain     Difficulty of Paying Living Expenses: 3   Food Insecurity: No Food Insecurity (2023)    Received from [facility]    Food Insecurity     Worried About Running Out of Food in the Last Year: 1   Transportation Needs: No Transportation Needs (2023)    Received from [facility]    Transportation Needs     Lack of Transportation (Medical): 1   Social Connections: Socially Isolated (2023)    Received from [facility]    Social Connections     Frequency of Communication with Friends and Family: 4   Housing Stability: Low Risk  (2023)    Received from [facility]    Housing Stability     Unable to Pay for  Housing in the Last Year: 1       VITALS:  BP (!) 194/84 (BP Location: Right arm)   Pulse 68   Temp 98  F (36.7  C) (Oral)   Resp 18   SpO2 96%     PHYSICAL EXAM:  Physical Exam  Vitals and nursing note reviewed.   Constitutional:       Appearance: Normal appearance.   HENT:      Head: Normocephalic and atraumatic.      Right Ear: External ear normal.      Left Ear: External ear normal.      Nose: Nose normal.      Mouth/Throat:      Mouth: Mucous membranes are moist.   Eyes:      Extraocular Movements: Extraocular movements intact.      Conjunctiva/sclera: Conjunctivae normal.      Pupils: Pupils are equal, round, and reactive to light.   Cardiovascular:      Rate and Rhythm: Normal rate and regular rhythm.   Pulmonary:      Effort: Pulmonary effort is normal.      Breath sounds: Normal breath sounds. No wheezing or rales.   Abdominal:      General: Abdomen is flat. There is no distension.      Palpations: Abdomen is soft.      Tenderness: There is no abdominal tenderness. There is no guarding.   Musculoskeletal:         General: Normal range of motion.      Cervical back: Normal range of motion and neck supple.      Right lower leg: No edema.      Left lower leg: No edema.   Lymphadenopathy:      Cervical: No cervical adenopathy.   Skin:     General: Skin is warm and dry.   Neurological:      Mental Status: He is alert and oriented to person, place, and time. Mental status is at baseline.      Comments: Left lower facial droop. Left arm ataxia.   Psychiatric:         Mood and Affect: Mood normal.         Behavior: Behavior normal.         Thought Content: Thought content normal.          LAB:  All pertinent labs reviewed and interpreted.  Results for orders placed or performed during the hospital encounter of 06/28/24   CTA Head Neck with Contrast    Impression    CONCLUSION:  HEAD CT:  No intracranial hemorrhage, mass, or definite CT evidence of recent ischemia.    HEAD CTA:  Minor intracranial  atherosclerosis. No vessel stenosis, occlusion or aneurysm.    NECK CTA:  1.  No dissection or hemodynamically significant narrowing in the neck by NASCET criteria.  2.  2.1 cm left parotid tail mass has enlarged mildly versus 2022. 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 ENT referral recommended.    Findings were discussed with Dr. REJI SINGH via telephone at 1426 hours on 6/28/2024.     CT Head Perfusion w Contrast - For Tier 2 Stroke    Impression    IMPRESSION:   1.  Normal cerebral perfusion.   MR Brain w/o & w Contrast    Impression    IMPRESSION:  1.  No acute infarct, intracranial hemorrhage, or intracranial mass.  2.  Mild age-related changes.  3.  Left parotid tail mass seen on CTA head is only partially visualized.     Basic metabolic panel   Result Value Ref Range    Sodium 136 135 - 145 mmol/L    Potassium 4.7 3.4 - 5.3 mmol/L    Chloride 100 98 - 107 mmol/L    Carbon Dioxide (CO2) 27 22 - 29 mmol/L    Anion Gap 9 7 - 15 mmol/L    Urea Nitrogen 20.4 8.0 - 23.0 mg/dL    Creatinine 1.27 (H) 0.67 - 1.17 mg/dL    GFR Estimate 60 (L) >60 mL/min/1.73m2    Calcium 8.3 (L) 8.8 - 10.2 mg/dL    Glucose 293 (H) 70 - 99 mg/dL   Result Value Ref Range    INR 1.10 0.85 - 1.15   Partial thromboplastin time   Result Value Ref Range    aPTT 29 22 - 38 Seconds   Result Value Ref Range    Troponin T, High Sensitivity 25 (H) <=22 ng/L   CBC with platelets and differential   Result Value Ref Range    WBC Count 6.8 4.0 - 11.0 10e3/uL    RBC Count 3.18 (L) 4.40 - 5.90 10e6/uL    Hemoglobin 10.3 (L) 13.3 - 17.7 g/dL    Hematocrit 31.7 (L) 40.0 - 53.0 %     78 - 100 fL    MCH 32.4 26.5 - 33.0 pg    MCHC 32.5 31.5 - 36.5 g/dL    RDW 11.7 10.0 - 15.0 %    Platelet Count 231 150 - 450 10e3/uL    % Neutrophils 62 %    % Lymphocytes 24 %    % Monocytes 6 %    % Eosinophils 7 %    % Basophils 1 %    % Immature Granulocytes 0 %    NRBCs per 100 WBC 0 <1  /100    Absolute Neutrophils 4.2 1.6 - 8.3 10e3/uL    Absolute Lymphocytes 1.6 0.8 - 5.3 10e3/uL    Absolute Monocytes 0.4 0.0 - 1.3 10e3/uL    Absolute Eosinophils 0.5 0.0 - 0.7 10e3/uL    Absolute Basophils 0.1 0.0 - 0.2 10e3/uL    Absolute Immature Granulocytes 0.0 <=0.4 10e3/uL    Absolute NRBCs 0.0 10e3/uL   Extra Blue Top Tube   Result Value Ref Range    Hold Specimen JIC    Extra Red Top Tube   Result Value Ref Range    Hold Specimen JIC    Result Value Ref Range    Hemoglobin A1C 7.5 (H) <5.7 %   Lipid panel reflex to direct LDL: Non-fasting   Result Value Ref Range    Cholesterol 102 <200 mg/dL    Triglycerides 88 <150 mg/dL    Direct Measure HDL 32 (L) >=40 mg/dL    LDL Cholesterol Calculated 52 <=100 mg/dL    Non HDL Cholesterol 70 <130 mg/dL   Result Value Ref Range    Troponin T, High Sensitivity 24 (H) <=22 ng/L   Glucose by meter   Result Value Ref Range    GLUCOSE BY METER POCT 143 (H) 70 - 99 mg/dL   Glucose by meter   Result Value Ref Range    GLUCOSE BY METER POCT 82 70 - 99 mg/dL   CBC with platelets   Result Value Ref Range    WBC Count 9.8 4.0 - 11.0 10e3/uL    RBC Count 3.60 (L) 4.40 - 5.90 10e6/uL    Hemoglobin 11.6 (L) 13.3 - 17.7 g/dL    Hematocrit 35.6 (L) 40.0 - 53.0 %    MCV 99 78 - 100 fL    MCH 32.2 26.5 - 33.0 pg    MCHC 32.6 31.5 - 36.5 g/dL    RDW 11.7 10.0 - 15.0 %    Platelet Count 263 150 - 450 10e3/uL   Basic metabolic panel   Result Value Ref Range    Sodium 141 135 - 145 mmol/L    Potassium 4.7 3.4 - 5.3 mmol/L    Chloride 105 98 - 107 mmol/L    Carbon Dioxide (CO2) 29 22 - 29 mmol/L    Anion Gap 7 7 - 15 mmol/L    Urea Nitrogen 14.8 8.0 - 23.0 mg/dL    Creatinine 1.05 0.67 - 1.17 mg/dL    GFR Estimate 75 >60 mL/min/1.73m2    Calcium 8.8 8.8 - 10.2 mg/dL    Glucose 109 (H) 70 - 99 mg/dL   Glucose by meter   Result Value Ref Range    GLUCOSE BY METER POCT 110 (H) 70 - 99 mg/dL   Echocardiogram Complete - For age > 60 yrs   Result Value Ref Range    LVEF  60-65%         RADIOLOGY:  Reviewed all pertinent imaging. Please see official radiology report.  Echocardiogram Complete - For age > 60 yrs   Final Result      MR Brain w/o & w Contrast   Final Result   IMPRESSION:   1.  No acute infarct, intracranial hemorrhage, or intracranial mass.   2.  Mild age-related changes.   3.  Left parotid tail mass seen on CTA head is only partially visualized.         CT Head Perfusion w Contrast - For Tier 2 Stroke   Final Result   IMPRESSION:    1.  Normal cerebral perfusion.      CTA Head Neck with Contrast   Final Result   CONCLUSION:   HEAD CT:   No intracranial hemorrhage, mass, or definite CT evidence of recent ischemia.      HEAD CTA:   Minor intracranial atherosclerosis. No vessel stenosis, occlusion or aneurysm.      NECK CTA:   1.  No dissection or hemodynamically significant narrowing in the neck by NASCET criteria.   2.  2.1 cm left parotid tail mass has enlarged mildly versus 2022. 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 ENT referral recommended.      Findings were discussed with Dr. REJI SINGH via telephone at 1426 hours on 6/28/2024.             I, Shabbir Gonzalez, am serving as a scribe to document services personally performed by Dr. Singh based on my observation and the provider's statements to me. I, Reji Singh MD attest that Shabbir Gonzalez is acting in a scribe capacity, has observed my performance of the services and has documented them in accordance with my direction.    Reji Singh M.D.  Emergency Medicine  [facility] EMERGENCY DEPARTMENT  [facility] [facility] [id number] [phone]  Dept: [phone]       Reji Singh MD  07/01/24 0558

## 2024-06-28 NOTE — ED NOTES
Bed: JNED-I  Expected date:   Expected time:   Means of arrival:   Comments:  [place] - 73M Generalized Weakness

## 2024-06-28 NOTE — ED TRIAGE NOTES
Patient brought by medics c/o weakness, unsteady gait, dizziness started 0900 this morning.  Patient c/o double vision when  RN is talking to the patient . MD is assessing patient  Blood sugar 320. Tier 2 stroke code.

## 2024-06-28 NOTE — PHARMACY-ADMISSION MEDICATION HISTORY
Pharmacist Admission Medication History    Admission medication history is complete. The information provided in this note is only as accurate as the sources available at the time of the update.    Information Source(s): Family member and CareEverywhere/SureScripts via in-person    Pertinent Information: none    Changes made to PTA medication list:  Added: donepezil, ozempic  Deleted: lisinopril, victoza, meclizine   Changed: incr venlafaxine, updated lantus dose    Allergies reviewed with patient and updates made in EHR: yes    Medication History Completed By: Colleen Morel [facility] 6/28/2024 4:49 PM    PTA Med List   Medication Sig Last Dose    albuterol (PROAIR HFA/PROVENTIL HFA/VENTOLIN HFA) 108 (90 Base) MCG/ACT inhaler Inhale 2 puffs into the lungs 2 times daily as needed for shortness of breath / dyspnea or wheezing Unknown at prn    ARIPiprazole (ABILIFY) 20 MG tablet Take 20 mg by mouth every evening 6/27/2024 at pm    aspirin (ASA) 81 MG EC tablet Take 1 tablet (81 mg) by mouth daily 6/28/2024 at am    atorvastatin (LIPITOR) 10 MG tablet Take 10 mg by mouth every evening 6/27/2024 at pm    brimonidine-timolol (COMBIGAN) 0.2-0.5 % ophthalmic solution Place 1 drop into both eyes 2 times daily 6/28/2024 at am    clopidogrel (PLAVIX) 75 MG tablet Take 1 tablet (75 mg) by mouth daily for 30 days 6/28/2024 at am    donepezil (ARICEPT) 10 MG tablet Take 10 mg by mouth daily 6/28/2024 at am    escitalopram (LEXAPRO) 20 MG tablet Take 20 mg by mouth every evening 6/27/2024 at pm    gabapentin (NEURONTIN) 400 MG capsule Take 1,200 mg by mouth 2 times daily 6/28/2024 at am    insulin glargine (LANTUS PEN) 100 UNIT/ML pen Inject 25 Units Subcutaneous every morning 6/28/2024 at am    lamoTRIgine (LAMICTAL) 100 MG tablet Take 300 mg by mouth every evening 6/27/2024 at pm    metFORMIN (GLUCOPHAGE-XR) 500 MG 24 hr tablet Take 500 mg by mouth 2 times daily (with meals) 6/28/2024 at am    montelukast (SINGULAIR) 10 MG  tablet Take 10 mg by mouth At Bedtime 6/27/2024 at pm    OLANZapine (ZYPREXA) 10 MG tablet Take 10 mg by mouth At Bedtime 6/27/2024 at pm    semaglutide (OZEMPIC, 0.25 OR 0.5 MG/DOSE,) 2 MG/1.5ML SOPN pen Inject 2 mg Subcutaneous every 7 days 6/23/2024 at Sunday evenings    traZODone (DESYREL) 150 MG tablet Take 300 mg by mouth At Bedtime 6/27/2024 at pm    venlafaxine (EFFEXOR-XR) 75 MG 24 hr capsule Take 225 mg by mouth daily 6/28/2024 at am

## 2024-06-29 ENCOUNTER — APPOINTMENT (OUTPATIENT)
Dept: CARDIOLOGY | Facility: HOSPITAL | Age: 74
End: 2024-06-29
Attending: STUDENT IN AN ORGANIZED HEALTH CARE EDUCATION/TRAINING PROGRAM
Payer: COMMERCIAL

## 2024-06-29 ENCOUNTER — APPOINTMENT (OUTPATIENT)
Dept: SPEECH THERAPY | Facility: HOSPITAL | Age: 74
End: 2024-06-29
Attending: STUDENT IN AN ORGANIZED HEALTH CARE EDUCATION/TRAINING PROGRAM
Payer: COMMERCIAL

## 2024-06-29 ENCOUNTER — APPOINTMENT (OUTPATIENT)
Dept: PHYSICAL THERAPY | Facility: HOSPITAL | Age: 74
End: 2024-06-29
Attending: STUDENT IN AN ORGANIZED HEALTH CARE EDUCATION/TRAINING PROGRAM
Payer: COMMERCIAL

## 2024-06-29 LAB
ANION GAP SERPL CALCULATED.3IONS-SCNC: 7 MMOL/L (ref 7–15)
BUN SERPL-MCNC: 14.8 MG/DL (ref 8–23)
CALCIUM SERPL-MCNC: 8.8 MG/DL (ref 8.8–10.2)
CHLORIDE SERPL-SCNC: 105 MMOL/L (ref 98–107)
CREAT SERPL-MCNC: 1.05 MG/DL (ref 0.67–1.17)
DEPRECATED HCO3 PLAS-SCNC: 29 MMOL/L (ref 22–29)
EGFRCR SERPLBLD CKD-EPI 2021: 75 ML/MIN/1.73M2
ERYTHROCYTE [DISTWIDTH] IN BLOOD BY AUTOMATED COUNT: 11.7 % (ref 10–15)
GLUCOSE BLDC GLUCOMTR-MCNC: 110 MG/DL (ref 70–99)
GLUCOSE BLDC GLUCOMTR-MCNC: 153 MG/DL (ref 70–99)
GLUCOSE BLDC GLUCOMTR-MCNC: 241 MG/DL (ref 70–99)
GLUCOSE SERPL-MCNC: 109 MG/DL (ref 70–99)
HCT VFR BLD AUTO: 35.6 % (ref 40–53)
HGB BLD-MCNC: 11.6 G/DL (ref 13.3–17.7)
LVEF ECHO: NORMAL
MCH RBC QN AUTO: 32.2 PG (ref 26.5–33)
MCHC RBC AUTO-ENTMCNC: 32.6 G/DL (ref 31.5–36.5)
MCV RBC AUTO: 99 FL (ref 78–100)
PLATELET # BLD AUTO: 263 10E3/UL (ref 150–450)
POTASSIUM SERPL-SCNC: 4.7 MMOL/L (ref 3.4–5.3)
RBC # BLD AUTO: 3.6 10E6/UL (ref 4.4–5.9)
SODIUM SERPL-SCNC: 141 MMOL/L (ref 135–145)
WBC # BLD AUTO: 9.8 10E3/UL (ref 4–11)

## 2024-06-29 PROCEDURE — G0378 HOSPITAL OBSERVATION PER HR: HCPCS

## 2024-06-29 PROCEDURE — 99232 SBSQ HOSP IP/OBS MODERATE 35: CPT | Performed by: INTERNAL MEDICINE

## 2024-06-29 PROCEDURE — 85027 COMPLETE CBC AUTOMATED: CPT | Performed by: STUDENT IN AN ORGANIZED HEALTH CARE EDUCATION/TRAINING PROGRAM

## 2024-06-29 PROCEDURE — 250N000013 HC RX MED GY IP 250 OP 250 PS 637: Performed by: STUDENT IN AN ORGANIZED HEALTH CARE EDUCATION/TRAINING PROGRAM

## 2024-06-29 PROCEDURE — 36415 COLL VENOUS BLD VENIPUNCTURE: CPT | Performed by: STUDENT IN AN ORGANIZED HEALTH CARE EDUCATION/TRAINING PROGRAM

## 2024-06-29 PROCEDURE — 96361 HYDRATE IV INFUSION ADD-ON: CPT

## 2024-06-29 PROCEDURE — 92507 TX SP LANG VOICE COMM INDIV: CPT | Mod: GN | Performed by: SPEECH-LANGUAGE PATHOLOGIST

## 2024-06-29 PROCEDURE — 82962 GLUCOSE BLOOD TEST: CPT

## 2024-06-29 PROCEDURE — 80048 BASIC METABOLIC PNL TOTAL CA: CPT | Performed by: STUDENT IN AN ORGANIZED HEALTH CARE EDUCATION/TRAINING PROGRAM

## 2024-06-29 PROCEDURE — 255N000002 HC RX 255 OP 636: Performed by: INTERNAL MEDICINE

## 2024-06-29 PROCEDURE — 250N000012 HC RX MED GY IP 250 OP 636 PS 637: Performed by: INTERNAL MEDICINE

## 2024-06-29 PROCEDURE — 93306 TTE W/DOPPLER COMPLETE: CPT | Mod: 26 | Performed by: INTERNAL MEDICINE

## 2024-06-29 PROCEDURE — C8929 TTE W OR WO FOL WCON,DOPPLER: HCPCS

## 2024-06-29 PROCEDURE — 92610 EVALUATE SWALLOWING FUNCTION: CPT | Mod: GN | Performed by: SPEECH-LANGUAGE PATHOLOGIST

## 2024-06-29 PROCEDURE — 92523 SPEECH SOUND LANG COMPREHEN: CPT | Mod: GN | Performed by: SPEECH-LANGUAGE PATHOLOGIST

## 2024-06-29 PROCEDURE — 250N000012 HC RX MED GY IP 250 OP 636 PS 637: Performed by: STUDENT IN AN ORGANIZED HEALTH CARE EDUCATION/TRAINING PROGRAM

## 2024-06-29 RX ADMIN — INSULIN GLARGINE 25 UNITS: 100 INJECTION, SOLUTION SUBCUTANEOUS at 13:21

## 2024-06-29 RX ADMIN — ASPIRIN 81 MG: 81 TABLET, COATED ORAL at 10:05

## 2024-06-29 RX ADMIN — GABAPENTIN 1200 MG: 300 CAPSULE ORAL at 10:07

## 2024-06-29 RX ADMIN — INSULIN ASPART 1 UNITS: 100 INJECTION, SOLUTION INTRAVENOUS; SUBCUTANEOUS at 13:21

## 2024-06-29 RX ADMIN — DONEPEZIL HYDROCHLORIDE 10 MG: 5 TABLET, FILM COATED ORAL at 10:08

## 2024-06-29 RX ADMIN — PERFLUTREN 2 ML: 6.52 INJECTION, SUSPENSION INTRAVENOUS at 10:14

## 2024-06-29 RX ADMIN — CLOPIDOGREL BISULFATE 75 MG: 75 TABLET ORAL at 10:08

## 2024-06-29 RX ADMIN — VENLAFAXINE HYDROCHLORIDE 225 MG: 150 CAPSULE, EXTENDED RELEASE ORAL at 10:05

## 2024-06-29 ASSESSMENT — ACTIVITIES OF DAILY LIVING (ADL)
ADLS_ACUITY_SCORE: 40
ADLS_ACUITY_SCORE: 40
ADLS_ACUITY_SCORE: 44
ADLS_ACUITY_SCORE: 41
ADLS_ACUITY_SCORE: 40
ADLS_ACUITY_SCORE: 43
ADLS_ACUITY_SCORE: 44
ADLS_ACUITY_SCORE: 44
ADLS_ACUITY_SCORE: 40
ADLS_ACUITY_SCORE: 41
ADLS_ACUITY_SCORE: 41
ADLS_ACUITY_SCORE: 40
ADLS_ACUITY_SCORE: 41
ADLS_ACUITY_SCORE: 44
ADLS_ACUITY_SCORE: 43
ADLS_ACUITY_SCORE: 40

## 2024-06-29 NOTE — PLAN OF CARE
Problem: Stroke, Ischemic (Includes Transient Ischemic Attack)  Goal: Optimal Functional Ability  Intervention: Optimize Functional Ability   Goal Outcome Evaluation:         Pt alert and oriented x4. On room air. BP elevated at 212/90 after ambulating in room. [place] notified. BP down to 197/92. NPO until speech is able to evaluate patient. Maintenance fluids running. NIH scoring 1 for slurred speech. Left facial droop not noticeable. NSR on tele. SCDs on overnight. Bed alarm on, call light within reach.

## 2024-06-29 NOTE — PROGRESS NOTES
PRIMARY DIAGNOSIS: Diplopia and Dysarthria  OUTPATIENT/OBSERVATION GOALS TO BE MET BEFORE DISCHARGE:  ADLs back to baseline: No    Activity and level of assistance: Up with Assist 1 gaitbelt and walker    Pain status: Denies pain    Return to near baseline physical activity: No     Discharge Planner Nurse   Safe discharge environment identified: Yes  Barriers to discharge:          Entered by: Adri Valenzuela RN 06/29/2024 4:09 AM     Please review provider order for any additional goals.   Nurse to notify provider when observation goals have been met and patient is ready for discharge.

## 2024-06-29 NOTE — PROGRESS NOTES
PRIMARY DIAGNOSIS: stroke like symptoms  OUTPATIENT/OBSERVATION GOALS TO BE MET BEFORE DISCHARGE:  ADLs back to baseline: No    Activity and level of assistance: Assist 1 with walker and gaitbelt    Pain status: Denies     Return to near baseline physical activity: No     Discharge Planner Nurse   Safe discharge environment identified: Yes  Barriers to discharge:          Entered by: Adri Valenzuela RN 06/29/2024 12:38 AM     Please review provider order for any additional goals.   Nurse to notify provider when observation goals have been met and patient is ready for discharge.      Pt alert and oriented x4. BP's elevated but allowing permissive HTN. On room air. NPO until speech is able to evaluate patient. Maintenance fluids running. NIH scoring 1 for slurred speech, facial droop not noted. Bed time blood sugar was 82. NSR on tele. Bed alarm on, call light within reach.

## 2024-06-29 NOTE — PLAN OF CARE
"PRIMARY DIAGNOSIS: \"GENERIC\" NURSING  OUTPATIENT/OBSERVATION GOALS TO BE MET BEFORE DISCHARGE:  ADLs back to baseline: Yes    Activity and level of assistance: Up with standby assistance.    Pain status: Pain free.    Return to near baseline physical activity: No     Discharge Planner Nurse   Safe discharge environment identified: No  Barriers to discharge: Yes       Entered by: Marcella Donovan RN 06/29/2024 2:56 PM     Please review provider order for any additional goals.   Nurse to notify provider when observation goals have been met and patient is ready for discharge.Goal Outcome Evaluation:                        "

## 2024-06-29 NOTE — PROGRESS NOTES
06/29/24 1515   Appointment Info   Signing Clinician's Name / Credentials (PT) Deann Nice, TYSON   Quick Adds   Quick Adds Certification   Living Environment   People in Home spouse;parent(s)   Current Living Arrangements apartment;other (see comments)  (Senior living apartment)   Home Accessibility no concerns   Transportation Anticipated family or friend will provide   Living Environment Comments Pt lives with spouse and mother in law in senior apartment.   Self-Care   Usual Activity Tolerance moderate   Current Activity Tolerance fair   Regular Exercise No   Equipment Currently Used at Home grab bar, toilet;shower chair;walker, rolling;other (see comments)  (4WW)   Fall history within last six months yes   Number of times patient has fallen within last six months 4   Activity/Exercise/Self-Care Comment Pt works with leather as a hobby.  Is IND in ADLs at baseline.  Wife does all cook, clean, drive, laundry.   General Information   Onset of Illness/Injury or Date of Surgery 06/28/24   Referring Physician Nickolas Martinez PA-C   Patient/Family Therapy Goals Statement (PT) Return home when ready   Pertinent History of Current Problem (include personal factors and/or comorbidities that impact the POC) Per chart review:  73 year old male with PMHx significant for T2DM, HLD, Depression, hx of TIA (2021), anxiety, Biploar disorder.  He presented to [facility] ER 6/28/2024 for evaluation of new onset unsteady gait, dizziness, and diplopia this morning.  Seen by neurology in the ER.  Patient was admitted for observation of possible TIA versus CVA.   Existing Precautions/Restrictions fall   Cognition   Affect/Mental Status (Cognition) WFL   Orientation Status (Cognition) oriented x 4   Follows Commands (Cognition) WFL   Posture    Posture Forward head position   Range of Motion (ROM)   Range of Motion ROM deficits secondary to weakness   Strength (Manual Muscle Testing)   Strength (Manual Muscle Testing) Deficits  observed during functional mobility   Bed Mobility   Bed Mobility supine-sit;sit-supine   Supine-Sit [place] (Bed Mobility) modified independence   Sit-Supine [place] (Bed Mobility) modified independence   Bed Mobility Limitations decreased ability to use arms for pushing/pulling;decreased ability to use legs for bridging/pushing   Impairments Contributing to Impaired Bed Mobility decreased strength   Assistive Device (Bed Mobility) bed rails   Transfers   Transfers sit-stand transfer   Impairments Contributing to Impaired Transfers decreased strength;other (see comments)  (dizziness)   Sit-Stand Transfer   Sit-Stand [place] (Transfers) contact guard   Assistive Device (Sit-Stand Transfers) walker, front-wheeled   Gait/Stairs (Locomotion)   [place] Level (Gait) contact guard   Assistive Device (Gait) walker, front-wheeled   Distance in Feet (Gait) 5   Pattern (Gait) swing-through   Deviations/Abnormal Patterns (Gait) festinating/shuffling;mateo decreased   Clinical Impression   Criteria for Skilled Therapeutic Intervention Yes, treatment indicated   PT Diagnosis (PT) Impaired functional mobility   Influenced by the following impairments weakness, dizziness   Functional limitations due to impairments gait, transfers   Clinical Presentation (PT Evaluation Complexity) evolving   Clinical Presentation Rationale Pt presents as clinically diagnosed.   Clinical Decision Making (Complexity) moderate complexity   Planned Therapy Interventions (PT) balance training;neuromuscular re-education;patient/family education;postural re-education;ROM (range of motion);strengthening;transfer training;gait training   Risk & Benefits of therapy have been explained evaluation/treatment results reviewed;patient;spouse/significant other   PT Total Evaluation Time   PT Eval, Moderate Complexity Minutes (29495) 15   Therapy Certification   Start of care date 06/29/24   Certification date from 06/29/24   Certification  date to 07/06/24   Medical Diagnosis Diplopia   Physical Therapy Goals   PT Frequency Daily   PT Predicted Duration/Target Date for Goal Attainment 07/06/24   PT Goals Bed Mobility;Transfers;Gait   PT: Bed Mobility Modified independent;Supine to/from sit;Rolling   PT: Transfers Modified independent;Supervision/stand-by assist;Sit to/from stand;Bed to/from chair;Assistive device   PT: Gait Supervision/stand-by assist;Assistive device;Rolling walker;Greater than 200 feet   Interventions   Interventions Quick Adds Gait Training   Gait Training   Gait Training Minutes (16327) 13   Symptoms Noted During/After Treatment (Gait Training) dizziness;fatigue   Treatment Detail/Skilled Intervention Pt amb 60 ft x2 with FWW and CGA.  Dizzy with turns.  Needs cues for safe use of walker during transitions.   Distance in Feet 60x2   [place] Level (Gait Training) contact guard   Physical Assistance Level (Gait Training) supervision;verbal cues;1 person assist   Assistive Device (Gait Training) rolling walker   Pattern Analysis (Gait Training) swing-through gait   Gait Analysis Deviations decreased mateo;decreased weight-shifting ability   Impairments (Gait Analysis/Training) balance impaired;strength decreased;ROM decreased   PT Discharge Planning   PT Plan trial 4WW, gait, transfers   PT Discharge Recommendation (DC Rec) home with assist;home with home care physical therapy   PT Rationale for DC Rec Pt is moving well and wife is able to assist at home.   PT Brief overview of current status amb 60x2ft with FWW CGA. Mod IND for bed mobility.  CGA for transfers   PT Equipment Needed at Discharge shower chair;walker, rolling       [facility]  OUTPATIENT PHYSICAL THERAPY EVALUATION  PLAN OF TREATMENT FOR OUTPATIENT REHABILITATION  (COMPLETE FOR INITIAL CLAIMS ONLY)  Patient's Last Name, First Name, M.I.  YOB: 1950  Montez Cabrera                        Provider's Name  [facility]  [facility] Medical Record No.  [id number]                             Onset Date:  06/28/24   Start of Care Date:  (P) 06/29/24   Type:     _X_PT   ___OT   ___SLP Medical Diagnosis:  (P) Diplopia              PT Diagnosis:  (P) Impaired functional mobility Visits from SOC:  1     See note for plan of treatment, functional goals and certification details    I CERTIFY THE NEED FOR THESE SERVICES FURNISHED UNDER        THIS PLAN OF TREATMENT AND WHILE UNDER MY CARE     (Physician co-signature of this document indicates review and certification of the therapy plan).

## 2024-06-29 NOTE — PLAN OF CARE
"PRIMARY DIAGNOSIS: \"GENERIC\" NURSING  OUTPATIENT/OBSERVATION GOALS TO BE MET BEFORE DISCHARGE:  ADLs back to baseline: No    Activity and level of assistance: Assist x1 with gait belt and walker     Pain status: Pain free.    Return to near baseline physical activity: No     Discharge Planner Nurse   Safe discharge environment identified: Yes  Barriers to discharge: Yes       Entered by: Flori Moise RN 06/29/2024 4:31 PM     Please review provider order for any additional goals.   Nurse to notify provider when observation goals have been met and patient is ready for discharge.  Problem: Stroke, Ischemic (Includes Transient Ischemic Attack)  Goal: Optimal Cognitive Function  Outcome: Progressing   Goal Outcome Evaluation:  Pt A&O x4  Scoring 0 on NIH scale  Remains HTN but not above ordered parameters. VSS on RA.  PIV remains c/d/I  Ambulating to bathroom with assist x1 with gait belt and walker.                      "

## 2024-06-29 NOTE — PROGRESS NOTES
[facility]    Medicine Progress Note - Hospitalist Service    Date of Admission:  6/28/2024    Assessment & Plan      Montez Cabrera is a 73 year old male with PMHx significant for T2DM, HLD, Depression, hx of TIA (2021), anxiety, Biploar disorder.  He presented to [facility] ER 6/28/2024 for evaluation of new onset unsteady gait, dizziness, and diplopia this morning.  Seen by neurology in the ER.  Patient was admitted for observation of possible TIA versus CVA.     Suspected CVA/TIA  Presents with new unsteady gait, dizziness and double vision,   found on exam with new left facial droop, left arm ataxia, left leg weakness  Previous TIA 2021 with residual right facial droop  CT head, CTA,  no acute findings, per stroke neurology concern is for brainstem versus right hemiparetic infarct  Brain MRI recommended, eventually no acute stroke seen on MRI  Pending neurology evaluation this morning  Stroke order set placed, echo results pending  Continue aspirin 81 daily, continue statin therapy, continue neurochecks  Await neuroevaluation today, PT OT evaluation     Elevated blood pressure noted  No previous history of hypertension  Not on any Home meds  Permissive hypertension for now  Can use labetalol if needed for sustained elevation  Might need blood pressure meds at discharge, suggest ACE inhibitors due to renal disease and diabetes    #DM type II  Hgb A1C 7.5. Home regimen includes Lantus 25 units every morning, metformin 500 mg twice daily, Ozempic 2 mg every Sunday.  -Resume Lantus, cleared by speech therapy this morning  - Medium intensity sliding scale   - Hold PTA Metformin. Give Ozempic if still here on Sunday  - QID BG checks, hypoglycemia protocol      # Dementia   Somewhat recent diagnosis per patient's wife.  Per patient's wife, he has difficulty with short-term memory and she would like to be around for any conversations or at least see daily updates.  - PTA Aricept  -Please  update pt's wife daily      #Parotid mass, incidental finding  CTA of the head noted a 2.1 cm left parotid tail mass mildly enlarged versus 2022.  - ENT referral recommended, MRI results confirm above, will update wife     # Asthma   Tolerating RA but has mild wheezing on auscultation.  - Continue PTA inhalers, Singulair     #Depression  #Bipolar disorder  -PTA Effexor 225 mg daily, Abilify 20 mg nightly, Lexapro 20 mg nightly, Zyprexa 10 mg nightly, and trazodone 300 mg nightly  -PTA gabapentin 1200 mg twice daily  -PTA lamotrigine 300 mg every evening     ANAMARIA noted on admission, this is resolved  Continue p.o. hydration  Follow BMP closely     #Cataracts  - Continue PTA eyedrops           Diet: Low Consistent Carb (45 g CHO per Meal) Diet    DVT Prophylaxis: Low Risk/Ambulatory with no VTE prophylaxis indicated  Castillo Catheter: Not present  Lines: None     Cardiac Monitoring: ACTIVE order. Indication: Stroke, acute (48 hours)  Code Status: Full Code      Clinically Significant Risk Factors Present on Admission                # Drug Induced Platelet Defect: home medication list includes an antiplatelet medication   # Hypertension: Noted on problem list            # DMII: A1C = 7.5 % (Ref range: <5.7 %) within past 6 months        # Asthma: noted on problem list        Disposition Plan     Medically Ready for Discharge: Anticipated in 2-4 Days             Darren Rodrigez MD  Hospitalist Service  [facility]  Securely message with [employer] (more info)  Text page via [employer] Paging/Directory   ______________________________________________________________________    Interval History   Mild dizziness, still has double vision,  no headaches    Physical Exam   Vital Signs: Temp: 98  F (36.7  C) Temp src: Oral BP: (!) 194/84 (nurse notified) Pulse: 68   Resp: 18 SpO2: 96 % O2 Device: None (Room air)    Weight: 0 lbs 0 oz    General Appearance: AAOx3  Respiratory: Clear anteriorly  Cardiovascular:  S1-S2, no murmur  GI: Soft nontender  Skin: No edema  Neuro with 5/5 power all 4 extremities, ambulation not tested,    Medical Decision Making       3 5 MINUTES SPENT BY ME on the date of service doing chart review, history, exam, documentation & further activities per the note.      Data

## 2024-06-29 NOTE — PLAN OF CARE
"PRIMARY DIAGNOSIS: \"GENERIC\" NURSING  OUTPATIENT/OBSERVATION GOALS TO BE MET BEFORE DISCHARGE:  ADLs back to baseline: No    Activity and level of assistance: Up with standby assistance.    Pain status: Pain free.    Return to near baseline physical activity: No     Discharge Planner Nurse   Safe discharge environment identified: No  Barriers to discharge: Yes       Entered by: Marcella Donovan RN 06/29/2024 2:57 PM     Please review provider order for any additional goals.   Nurse to notify provider when observation goals have been met and patient is ready for discharge.Goal Outcome Evaluation:       Patient denies pain this shift.  Scoring 0 on NIH.  Blood sugars covered per orders.  Bed/chair alarm in place, call light within reach.  Assist x 1 with belt and walker.                 "

## 2024-06-29 NOTE — PROGRESS NOTES
Speech-Language Pathology:  Clinical Swallow Evaluation and Speech Language Cognitive Evaluation       06/29/24 0900   Appointment Info   Signing Clinician's Name / Credentials (SLP) Catherine Rey MA, CCC-SLP   General Information   Onset of Illness/Injury or Date of Surgery 06/28/24   Referring Physician Nickolas Martinez PA-C   Patient/Family Therapy Goal Statement (SLP) To go home   Pertinent History of Current Problem Per H&P: Pt is a 73 year old male with PMHx significant for T2DM, HLD, Depression, hx of TIA (2021), anxiety, Biploar disorder.  He presented to [facility] ER 6/28/2024 for evaluation of new onset unsteady gait, dizziness, and diplopia this morning.  Seen by neurology in the ER.  Patient was admitted for observation of possible TIA versus CVA. Pt NPO until swallow eval due to slurred speech.   General Observations Alert and cooperative   Type of Evaluation   Type of Evaluation Swallow Evaluation;Speech, Language, Cognition   Oral Motor   Oral Musculature generally intact  (very mild left facial droop, baseline, primarily at rest and during speech - does not impact swallow)   Mucosal Quality good   Dentition (Oral Motor)   Dentition (Oral Motor) adequate dentition   Facial Symmetry (Oral Motor)   Facial Symmetry (Oral Motor) left side impairment   Comment, Facial Symmetry (Oral Motor) Mild   Lip Function (Oral Motor)   Lip Range of Motion (Oral Motor) WNL;other (see comments)  (lip/facial droop primarily notable at rest - no impact to pucker, smile, or lip seal)   Lip Strength (Oral Motor) WNL   Tongue Function (Oral Motor)   Tongue Strength (Oral Motor) WNL   Tongue Coordination/Speed (Oral Motor) WNL   Tongue ROM (Oral Motor) WNL   Jaw Function (Oral Motor)   Jaw Function (Oral Motor) WNL   Cough/Swallow/Gag Reflex (Oral Motor)   Soft Palate/Velum (Oral Motor) WNL   Volitional Throat Clear/Cough (Oral Motor) WNL   Volitional Swallow (Oral Motor) WNL   Vocal Quality/Secretion Management (Oral Motor)    Vocal Quality (Oral Motor) WNL   Secretion Management (Oral Motor) WNL   General Swallowing Observations   Past History of Dysphagia None per EMR, RN, or patient report.   Respiratory Support room air   Current Diet/Method of Nutritional Intake (General Swallowing Observations, NIS) NPO   Swallowing Evaluation Clinical swallow evaluation   Clinical Swallow Evaluation   Clinical Swallow Evaluation Textures Trialed thin liquids;solid foods;pureed;moderately thick liquids/liquidized   Clinical Swallow Eval: Thin Liquid Texture Trial   Mode of Presentation, Thin Liquids cup;straw   Oral Phase of Swallow WFL   Pharyngeal Phase of Swallow intact   Diagnostic Statement No overt s/s aspiration   Clinical Swallow Eval: Moderately Thick Liquids   Mode of Presentation cup   Oral Phase WFL   Pharyngeal Phase intact   Diagnostic Statement No overt s/s aspiration   Clinical Swallow Evaluation: Puree Solid Texture Trial   Mode of Presentation, Puree spoon   Volume of Puree Presented 2 ounces   Oral Phase, Puree WFL   Pharyngeal Phase, Puree intact   Diagnostic Statement No difficulty   Clinical Swallow Evaluation: Solid Food Texture Trial   Mode of Presentation self-fed   Volume Presented 1 cracker   Oral Phase WFL   Pharyngeal Phase intact   Diagnostic Statement No difficulty   Esophageal Phase of Swallow   Patient reports or presents with symptoms of esophageal dysphagia No   Swallowing Recommendations   Diet Consistency Recommendations regular diet;thin liquids (level 0)   Recommended Feeding/Eating Techniques (Swallow Eval) maintain upright sitting position for eating   Medication Administration Recommendations, Swallowing (SLP) Per patient preference   Instrumental Assessment Recommendations instrumental evaluation not recommended at this time   Comment, Swallowing Recommendations Patient appears to be at low risk for aspiration when eating/drinking.   Motor Speech   Speech Intelligibility (Motor Speech) WFL;conversational  "level   Comment, Motor Speech Assessment 100% intelligible in all tasks/conversation   Articulation (Motor Speech) imprecise articulation;WFL   Auditory Comprehension   Follows Commands (Auditory Comprehension) multi-step commands   Comment, Assessment (Auditory Comprehension) Did demostrate difficulty only when multi-step and complex (i.e. \"Do x AFTER you do y\" or \"Instead of doing x, do  y\"   Verbal Expression   Comment, Assessment (Verbal Expression) Pt endorses word-finding difficulty, but none noted during structured tasks   Confrontational Naming (Verbal Expression) WNL;objects   Responsive Naming (Verbal Expression) WNL;semantic/function   Word Finding Skills (Verbal Expression) generative naming;WFL   Reading Comprehension   Oral Reading Ability (Reading Comprehension) WFL   Written Language   Written Expression (Written Language) WFL;other (see comments)  (personal info)   Cognition   Cognitive Function memory deficit  (at baseline)   Cognitive Status   (Noted, per chart review, pt recently diagnosed with dementia.)   Cognitive Status Exam Comments Appropriate recall of recent events and delayed (5 minutes) recall of 3 words   Orientation Status (Cognition) oriented x 4   Clinical Impression   Criteria for Skilled Therapeutic Interventions Met (SLP Eval) Yes, treatment indicated   SLP Diagnosis Very mild dysarthria   Risks & Benefits of therapy have been explained evaluation/treatment results reviewed;care plan/treatment goals reviewed;participants voiced agreement with care plan;participants included;patient   Clinical Impression Comments Patient participated in Clinical Swallow Evaluation. No s/s aspiration with any intake. Oral motor was WFL. Mastication was adequate. Hyolaryngeal movement was present. Recommend diet:Regular/thin. Also presents with mild dysarthria and endorses word-finding difficulty; 100% intelligible and skills functional for expressing needs.  Please contact SLP with any questions or " concerns.   SLP Total Evaluation Time   Eval: oral/pharyngeal swallow function, clinical swallow Minutes (32065) 20   Eval: Sound production with lang comprehension and expression Minutes (86995) 20   SLP Goals   Therapy Frequency (SLP Eval) one time eval and treatment only   SLP Predicted Duration/Target Date for Goal Attainment 06/29/24   SLP Goals Communication   SLP: Communicate basic wants and needs Goal Met;minimal assist;verbally   Interventions   Interventions Quick Adds Speech, Language, Voice & Communication   Speech, Language, Voice Communication&/or Auditory Processing   Treatment of Speech, Language, Voice Communication&/or Auditory Processing Minutes (00179) 8   Treatment Detail/Skilled Intervention Pt endorses mild word-finding difficulty. Trained x2 word-finding strategies (describing, using synonyms) and pt was able to functionally demonstrate both.   SLP Discharge Planning   SLP Plan Sign off/complete orders   SLP Discharge Recommendation other (see comments)   SLP Rationale for DC Rec Skills functional for this setting.   SLP Brief overview of current status  Recommend regular textures and thin liquids. Pt presents with very mild dysarthria - 100% intelligible and functional for this setting. No further SLP needs at this time. Please contact SLP with any questions or concerns.   Total Session Time   Total Session Time (sum of timed and untimed services) 48                                                                                [facility]      OUTPATIENT SPEECH LANGUAGE PATHOLOGY EVALUATION  PLAN OF TREATMENT FOR OUTPATIENT REHABILITATION  (COMPLETE FOR INITIAL CLAIMS ONLY)  Patient's Last Name, First Name, M.I.  YOB: 1950  Montez Cabrera                        Provider's Name  [facility] Medical Record No.  [id number]                               Onset Date:  06/28/24  Start of Care Date:      Type:     ___PT    ___OT   _X_SLP Medical Diagnosis:            SLP Diagnosis:  Very mild dysarthria  Visits from SOC:  1   ________________________________________________________________  Plan of Treatment/Functional Goals    Planned Interventions:                    Goals: See Speech Language Pathology Goals on Care Plan in [facility] electronic health record.    Therapy Frequency:one time eval and treatment only   Predicted Duration of Therapy Intervention: 06/29/24  ________________________________________________________________________________    I CERTIFY THE NEED FOR THESE SERVICES FURNISHED UNDER        THIS PLAN OF TREATMENT AND WHILE UNDER MY CARE     (Physician attestation of this document indicates review and certification of the therapy plan).                     Referring Physician: Nickolas Martinez PA-C           Initial Assessment        See Speech Language Pathology documentation in Epic electronic health record, evaluation dated

## 2024-06-29 NOTE — PROGRESS NOTES
PRIMARY DIAGNOSIS: stroke like symptoms  OUTPATIENT/OBSERVATION GOALS TO BE MET BEFORE DISCHARGE:  ADLs back to baseline: No    Activity and level of assistance: Up with Assist 1 gaitbelt and walker    Pain status: Denies pain    Return to near baseline physical activity: No     Discharge Planner Nurse   Safe discharge environment identified: Yes  Barriers to discharge:          Entered by: Adri Valenzuela RN 06/29/2024 12:27 AM     Please review provider order for any additional goals.   Nurse to notify provider when observation goals have been met and patient is ready for discharge.    Pt A&Ox4, BP elevated but allowing permissive HTN. NIH scoring 2 for slurred speech and facial droop. NPO until speech eval. Maintenance fluids running. Bed alarm on, call light with in reach.

## 2024-06-29 NOTE — CONSULTS
NEUROLOGY INPATIENT CONSULTATION NOTE       [facility]  [address] [place] [address]  Tel: [phone]  Fax: [phone]  [email or web address]     Montez Cabrera,  1950, MRN [id number]  PCP: Lori Cerrato  Date: 2024     ASSESSMENT & PLAN     Diagnosis code: Strokelike symptoms    Transient ischemic attack  73-year-old male with history of HLD, DM 2, bipolar disorder, anxiety presented to the emergency room with complaint of unsteady gait, dizziness and diplopia.  Diplopia is a chronic symptom for which in the past patient was prescribed prism glasses.  CT of the head, CTA unremarkable  MRI brain showed atrophy but no acute infarct  Incidentally on CT and MRI scan left parotid mass noted.  ENT consult pending  Echocardiogram shows normal ejection fraction with no significant valvular heart abnormality  DAPT with aspirin and Plavix for 90 days, afterwards switch to enteric-coated aspirin 325 mg daily  LDL is 52, continue Lipitor 10 mg daily  Stroke education provided  At the [facility] from neurology standpoint, will sign off.  Please call if any questions    Thank you again for this referral, please feel free to contact me if you have any questions.    Suresh Haile MD  [facility]     CHIEF COMPLAINT <principal problem not specified>     HISTORY OF PRESENT ILLNESS     We have been requested by Dr. Rodrigez to evaluate Montez aCbrera who is a 73 year old  male for strokelike symptoms    Patient is a 73-year-old male with history of HLD, DM 2, bipolar disorder, anxiety presented to [facility] emergency room on 2024 for acute onset of unsteady gait, dizziness and double vision.  He reported his symptoms started in the morning.  He was noted to have left facial droop, left dysmetria and left leg weakness.  Stroke alert was initiated CT of the head and CTA head and neck was done that was unremarkable CT perfusion was  "also negative and therefore patient did not receive thrombolytic.  MRI brain was subsequently done that did not show any acute infarct but a 2.1 cm left parotid tail mass was noted that had enlarged compared to 2022 and a ENT consultation was recommended.  Echocardiogram showed normal ejection fraction with no significant valvular heart abnormality.  Patient was started on aspirin, Lipitor, Plavix and admitted for further evaluation.    I should note patient was admitted couple of years ago for possible TIA with sudden onset of fatigue, slurred speech and complained of diplopia that actually is chronic due to a past fall for which she sees ophthalmology.     PROBLEM LIST      Patient Active Problem List   Diagnosis    Respiratory insufficiency    Controlled type 2 diabetes mellitus, without long-term current use of insulin (H)    Recurrent major depressive disorder (H24)    Asthma    Depression    Anxiety    Bipolar I disorder (H)    Other insomnia    Hx of TIA (transient ischemic attack)    Disorder of stomach    Gastritis    Essential hypertension    Hyperlipidemia    Indigestion    Moderate persistent asthma without complication    Primary localized osteoarthritis of pelvic region and thigh    Vitamin B12 deficiency (non anemic)    Bipolar 1 disorder (H)    Mixed anxiety and depressive disorder    Insomnia    Dysequilibrium    Acute kidney injury (H24)    Diplopia    Dysarthria      Clinically Significant Risk Factors Present on Admission                  # Drug Induced Platelet Defect: home medication list includes an antiplatelet medication       # Hypertension: Noted on problem list                # DMII: A1C = 7.5 % (Ref range: <5.7 %) within past 6 months    # Overweight: Estimated body mass index is 29.97 kg/m  as calculated from the following:    Height as of this encounter: 1.626 m (5' 4\").    Weight as of this encounter: 79.2 kg (174 lb 9.7 oz).       # Asthma: noted on problem list        PAST MEDICAL & " SURGICAL HISTORY     Past Medical History: Patient  has a past medical history of Anxiety (04/24/2020), Asthma exacerbation (09/11/2017), Bipolar I disorder (H) (04/24/2020), Controlled type 2 diabetes mellitus, without long-term current use of insulin (H) (09/11/2017), Mixed hyperlipidemia, Recurrent major depressive disorder (H24) (09/11/2017), and Respiratory insufficiency (09/11/2017).    Past Surgical History: He  has a past surgical history that includes Foot surgery and other surgical history.     SOCIAL HISTORY     Reviewed, and he  reports that he quit smoking about 46 years ago. He has never used smokeless tobacco. He reports that he does not drink alcohol and does not use drugs.     FAMILY HISTORY     Reviewed, and family history is not on file.     ALLERGIES     Allergies   Allergen Reactions    Bupropion Diarrhea    Erythromycin Diarrhea    Theophylline GI Disturbance        REVIEW OF SYSTEMS     Pertinent items are noted in HPI.     HOME & HOSPITAL MEDICATIONS     Prior to Admission Medications  Medications Prior to Admission   Medication Sig Dispense Refill Last Dose    albuterol (PROAIR HFA/PROVENTIL HFA/VENTOLIN HFA) 108 (90 Base) MCG/ACT inhaler Inhale 2 puffs into the lungs 2 times daily as needed for shortness of breath / dyspnea or wheezing   Unknown at prn    ARIPiprazole (ABILIFY) 20 MG tablet Take 20 mg by mouth every evening   6/27/2024 at pm    aspirin (ASA) 81 MG EC tablet Take 1 tablet (81 mg) by mouth daily  0 6/28/2024 at am    atorvastatin (LIPITOR) 10 MG tablet Take 10 mg by mouth every evening   6/27/2024 at pm    brimonidine-timolol (COMBIGAN) 0.2-0.5 % ophthalmic solution Place 1 drop into both eyes 2 times daily   6/28/2024 at am    clopidogrel (PLAVIX) 75 MG tablet Take 1 tablet (75 mg) by mouth daily for 30 days 30 tablet 0 6/28/2024 at am    donepezil (ARICEPT) 10 MG tablet Take 10 mg by mouth daily   6/28/2024 at am    escitalopram (LEXAPRO) 20 MG tablet Take 20 mg by mouth  every evening   6/27/2024 at pm    gabapentin (NEURONTIN) 400 MG capsule Take 1,200 mg by mouth 2 times daily   6/28/2024 at am    insulin glargine (LANTUS PEN) 100 UNIT/ML pen Inject 25 Units Subcutaneous every morning   6/28/2024 at am    lamoTRIgine (LAMICTAL) 100 MG tablet Take 300 mg by mouth every evening   6/27/2024 at pm    metFORMIN (GLUCOPHAGE-XR) 500 MG 24 hr tablet Take 500 mg by mouth 2 times daily (with meals)   6/28/2024 at am    montelukast (SINGULAIR) 10 MG tablet Take 10 mg by mouth At Bedtime   6/27/2024 at pm    OLANZapine (ZYPREXA) 10 MG tablet Take 10 mg by mouth At Bedtime   6/27/2024 at pm    semaglutide (OZEMPIC, 0.25 OR 0.5 MG/DOSE,) 2 MG/1.5ML SOPN pen Inject 2 mg Subcutaneous every 7 days   6/23/2024 at Sunday evenings    traZODone (DESYREL) 150 MG tablet Take 300 mg by mouth At Bedtime   6/27/2024 at pm    venlafaxine (EFFEXOR-XR) 75 MG 24 hr capsule Take 225 mg by mouth daily   6/28/2024 at am       Hospital Medications  Current Facility-Administered Medications   Medication Dose Route Frequency Provider Last Rate Last Admin    ARIPiprazole (ABILIFY) tablet 20 mg  20 mg Oral QPM Nickolas Martinez PA-C        aspirin EC tablet 81 mg  81 mg Oral Daily Nickolas Martinez PA-C   81 mg at 06/29/24 1005    atorvastatin (LIPITOR) tablet 10 mg  10 mg Oral QPM Nickolas Martinez PA-C        brimonidine-timolol (COMBIGAN) 0.2-0.5 % ophthalmic solution 1 drop  1 drop Both Eyes BID Nickolas Martinez PA-C   1 drop at 06/28/24 2202    clopidogrel (PLAVIX) tablet 75 mg  75 mg Oral or NG Tube Daily Nickolas Martinez PA-C   75 mg at 06/29/24 1008    donepezil (ARICEPT) tablet 10 mg  10 mg Oral Daily Nickolas Martinez PA-C   10 mg at 06/29/24 1008    escitalopram (LEXAPRO) tablet 20 mg  20 mg Oral QPM Nickolas Martinez PA-C        gabapentin (NEURONTIN) capsule 1,200 mg  1,200 mg Oral BID Nickolas Martinez PA-C   1,200 mg at 06/29/24 1007    insulin aspart (NovoLOG) injection (RAPID ACTING)  1-7 Units Subcutaneous  TID AC Nickolas Martinez PA-C   1 Units at 06/29/24 1321    insulin aspart (NovoLOG) injection (RAPID ACTING)  1-5 Units Subcutaneous At Bedtime Nickolas Martinez PA-C        insulin glargine (LANTUS PEN) injection 25 Units  25 Units Subcutaneous Darren Hyman MD   25 Units at 06/29/24 1321    lamoTRIgine (LaMICtal) tablet 300 mg  300 mg Oral QPM Nickolas Martinez PA-C        montelukast (SINGULAIR) tablet 10 mg  10 mg Oral At Bedtime Nickolas Martinez PA-C        OLANZapine (zyPREXA) tablet 10 mg  10 mg Oral At Bedtime Nickolas Martinez PA-C        sodium chloride (PF) 0.9% PF flush 3 mL  3 mL Intracatheter Q8H Nickolas Martinez PA-C   3 mL at 06/29/24 1806    traZODone (DESYREL) tablet 300 mg  300 mg Oral At Bedtime Nickolas Martinez PA-C        venlafaxine (EFFEXOR XR) 24 hr capsule 225 mg  225 mg Oral Daily Nickolas Martinez PA-C   225 mg at 06/29/24 1005        PHYSICAL EXAM     Vital signs  Temp:  [98  F (36.7  C)-98.4  F (36.9  C)] 98.1  F (36.7  C)  Pulse:  [62-94] 64  Resp:  [18] 18  BP: (167-197)/(79-93) 167/79  SpO2:  [94 %-96 %] 94 %    General Physical Exam: Patient is alert and oriented x 3. Vital signs were reviewed and are documented in EMR. Neck was supple, no carotid bruit, thyromegaly, JVD or lymphadenopathy noted.  Neurological Exam:  Patient is alert and oriented x 3 cranial nerves II through XII are intact except patient has nystagmus on end gaze.  He has normal mass and tone with 5/5 strength reflexes trace positive toes downgoing.  Sensation intact to light touch and pinprick in lower extremities no dysmetria noted on finger-nose testing.  Gait testing deferred     DIAGNOSTIC STUDIES     Pertinent Radiology   Radiology Results: Reviewed impression and images     CT  HEAD CT:   No intracranial hemorrhage, mass, or definite CT evidence of recent ischemia.     HEAD CTA:   Minor intracranial atherosclerosis. No vessel stenosis, occlusion or aneurysm.     NECK CTA:   1.  No dissection or  hemodynamically significant narrowing in the neck by NASCET criteria.   2.  2.1 cm left parotid tail mass has enlarged mildly versus 2022. 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 ENT referral recommended.     IMPRESSION:   1.  Normal cerebral perfusion.   MRI  1.  No acute infarct, intracranial hemorrhage, or intracranial mass.   2.  Mild age-related changes.   3.  Left parotid tail mass seen on CTA head is only partially visualized.     Echocardiogram  1. The left ventricle is normal in size. Left ventricular function is   normal.The ejection fraction is 60-65%. No regional wall motion abnormalities   noted.   2. Normal right ventricle size and systolic function.   3. No hemodynamically significant valvular abnormalities on 2D or color flow   imaging.   4. Compared to the prior study dated 12/31/22, there have been no changes.   Pertinent Labs   Lab Results: Personally Reviewed   Recent Results (from the past 24 hour(s))   CBC with platelets    Collection Time: 06/29/24  7:31 AM   Result Value Ref Range    WBC Count 9.8 4.0 - 11.0 10e3/uL    RBC Count 3.60 (L) 4.40 - 5.90 10e6/uL    Hemoglobin 11.6 (L) 13.3 - 17.7 g/dL    Hematocrit 35.6 (L) 40.0 - 53.0 %    MCV 99 78 - 100 fL    MCH 32.2 26.5 - 33.0 pg    MCHC 32.6 31.5 - 36.5 g/dL    RDW 11.7 10.0 - 15.0 %    Platelet Count 263 150 - 450 10e3/uL   Basic metabolic panel    Collection Time: 06/29/24  7:31 AM   Result Value Ref Range    Sodium 141 135 - 145 mmol/L    Potassium 4.7 3.4 - 5.3 mmol/L    Chloride 105 98 - 107 mmol/L    Carbon Dioxide (CO2) 29 22 - 29 mmol/L    Anion Gap 7 7 - 15 mmol/L    Urea Nitrogen 14.8 8.0 - 23.0 mg/dL    Creatinine 1.05 0.67 - 1.17 mg/dL    GFR Estimate 75 >60 mL/min/1.73m2    Calcium 8.8 8.8 - 10.2 mg/dL    Glucose 109 (H) 70 - 99 mg/dL   Glucose by meter    Collection Time: 06/29/24  7:55 AM   Result Value Ref Range    GLUCOSE BY METER POCT 110 (H) 70 - 99  mg/dL   Echocardiogram Complete - For age > 60 yrs    Collection Time: 06/29/24  9:00 AM   Result Value Ref Range    LVEF  60-65%    Glucose by meter    Collection Time: 06/29/24  1:10 PM   Result Value Ref Range    GLUCOSE BY METER POCT 153 (H) 70 - 99 mg/dL   Glucose by meter    Collection Time: 06/29/24  5:40 PM   Result Value Ref Range    GLUCOSE BY METER POCT 241 (H) 70 - 99 mg/dL   Glucose by meter    Collection Time: 06/30/24 12:52 AM   Result Value Ref Range    GLUCOSE BY METER POCT 111 (H) 70 - 99 mg/dL       Total time spent for face to face visit, reviewing labs/imaging studies, counseling and coordination of care was: 1 Hour 30 Minutes More than 50% of this time was spent on counseling and coordination of care.    This note was dictated using voice recognition software.  Any grammatical or context distortions are unintentional and inherent to the software.

## 2024-06-30 ENCOUNTER — APPOINTMENT (OUTPATIENT)
Dept: OCCUPATIONAL THERAPY | Facility: HOSPITAL | Age: 74
End: 2024-06-30
Attending: STUDENT IN AN ORGANIZED HEALTH CARE EDUCATION/TRAINING PROGRAM
Payer: COMMERCIAL

## 2024-06-30 ENCOUNTER — APPOINTMENT (OUTPATIENT)
Dept: PHYSICAL THERAPY | Facility: HOSPITAL | Age: 74
End: 2024-06-30
Payer: COMMERCIAL

## 2024-06-30 LAB
GLUCOSE BLDC GLUCOMTR-MCNC: 111 MG/DL (ref 70–99)
GLUCOSE BLDC GLUCOMTR-MCNC: 143 MG/DL (ref 70–99)
GLUCOSE BLDC GLUCOMTR-MCNC: 159 MG/DL (ref 70–99)
GLUCOSE BLDC GLUCOMTR-MCNC: 168 MG/DL (ref 70–99)
GLUCOSE BLDC GLUCOMTR-MCNC: 168 MG/DL (ref 70–99)

## 2024-06-30 PROCEDURE — 250N000011 HC RX IP 250 OP 636: Performed by: HOSPITALIST

## 2024-06-30 PROCEDURE — 99233 SBSQ HOSP IP/OBS HIGH 50: CPT | Performed by: INTERNAL MEDICINE

## 2024-06-30 PROCEDURE — 99215 OFFICE O/P EST HI 40 MIN: CPT | Performed by: PSYCHIATRY & NEUROLOGY

## 2024-06-30 PROCEDURE — 97535 SELF CARE MNGMENT TRAINING: CPT | Mod: GO

## 2024-06-30 PROCEDURE — 250N000013 HC RX MED GY IP 250 OP 250 PS 637: Performed by: FAMILY MEDICINE

## 2024-06-30 PROCEDURE — 250N000013 HC RX MED GY IP 250 OP 250 PS 637: Performed by: INTERNAL MEDICINE

## 2024-06-30 PROCEDURE — G0378 HOSPITAL OBSERVATION PER HR: HCPCS

## 2024-06-30 PROCEDURE — 82962 GLUCOSE BLOOD TEST: CPT

## 2024-06-30 PROCEDURE — 250N000013 HC RX MED GY IP 250 OP 250 PS 637: Performed by: STUDENT IN AN ORGANIZED HEALTH CARE EDUCATION/TRAINING PROGRAM

## 2024-06-30 PROCEDURE — 96372 THER/PROPH/DIAG INJ SC/IM: CPT | Mod: XU | Performed by: INTERNAL MEDICINE

## 2024-06-30 PROCEDURE — 97116 GAIT TRAINING THERAPY: CPT | Mod: GP | Performed by: PHYSICAL THERAPIST

## 2024-06-30 PROCEDURE — 99417 PROLNG OP E/M EACH 15 MIN: CPT | Performed by: PSYCHIATRY & NEUROLOGY

## 2024-06-30 PROCEDURE — 97165 OT EVAL LOW COMPLEX 30 MIN: CPT | Mod: GO

## 2024-06-30 PROCEDURE — 96374 THER/PROPH/DIAG INJ IV PUSH: CPT

## 2024-06-30 PROCEDURE — 250N000011 HC RX IP 250 OP 636: Performed by: INTERNAL MEDICINE

## 2024-06-30 RX ORDER — METOPROLOL SUCCINATE 25 MG/1
25 TABLET, EXTENDED RELEASE ORAL DAILY
Qty: 60 TABLET | Refills: 1 | Status: SHIPPED | OUTPATIENT
Start: 2024-07-01 | End: 2024-07-01

## 2024-06-30 RX ORDER — HYDRALAZINE HYDROCHLORIDE 20 MG/ML
10 INJECTION INTRAMUSCULAR; INTRAVENOUS EVERY 4 HOURS PRN
Status: DISCONTINUED | OUTPATIENT
Start: 2024-06-30 | End: 2024-07-01 | Stop reason: HOSPADM

## 2024-06-30 RX ORDER — AMLODIPINE BESYLATE 5 MG/1
5 TABLET ORAL DAILY
Status: DISCONTINUED | OUTPATIENT
Start: 2024-06-30 | End: 2024-06-30

## 2024-06-30 RX ORDER — AMLODIPINE BESYLATE 5 MG/1
5 TABLET ORAL ONCE
Status: COMPLETED | OUTPATIENT
Start: 2024-06-30 | End: 2024-06-30

## 2024-06-30 RX ORDER — AMLODIPINE BESYLATE 5 MG/1
5 TABLET ORAL DAILY
Qty: 60 TABLET | Refills: 1 | Status: SHIPPED | OUTPATIENT
Start: 2024-07-01

## 2024-06-30 RX ORDER — AMLODIPINE BESYLATE 5 MG/1
10 TABLET ORAL DAILY
Status: DISCONTINUED | OUTPATIENT
Start: 2024-07-01 | End: 2024-07-01 | Stop reason: HOSPADM

## 2024-06-30 RX ORDER — ASPIRIN 325 MG
325 TABLET, DELAYED RELEASE (ENTERIC COATED) ORAL DAILY
Qty: 30 TABLET | Refills: 1 | Status: SHIPPED | OUTPATIENT
Start: 2024-09-28

## 2024-06-30 RX ORDER — ASPIRIN 81 MG/1
81 TABLET ORAL DAILY
Status: SHIPPED
Start: 2024-06-30 | End: 2024-09-28

## 2024-06-30 RX ORDER — CLOPIDOGREL BISULFATE 75 MG/1
75 TABLET ORAL DAILY
Status: SHIPPED
Start: 2024-06-30 | End: 2024-09-28

## 2024-06-30 RX ORDER — ENOXAPARIN SODIUM 100 MG/ML
40 INJECTION SUBCUTANEOUS EVERY 24 HOURS
Status: DISCONTINUED | OUTPATIENT
Start: 2024-06-30 | End: 2024-07-01 | Stop reason: HOSPADM

## 2024-06-30 RX ORDER — METOPROLOL SUCCINATE 25 MG/1
25 TABLET, EXTENDED RELEASE ORAL
Status: COMPLETED | OUTPATIENT
Start: 2024-06-30 | End: 2024-06-30

## 2024-06-30 RX ORDER — METOPROLOL SUCCINATE 50 MG/1
50 TABLET, EXTENDED RELEASE ORAL DAILY
Status: DISCONTINUED | OUTPATIENT
Start: 2024-07-01 | End: 2024-07-01 | Stop reason: HOSPADM

## 2024-06-30 RX ORDER — METOPROLOL SUCCINATE 25 MG/1
25 TABLET, EXTENDED RELEASE ORAL DAILY
Status: DISCONTINUED | OUTPATIENT
Start: 2024-06-30 | End: 2024-06-30

## 2024-06-30 RX ADMIN — INSULIN ASPART 1 UNITS: 100 INJECTION, SOLUTION INTRAVENOUS; SUBCUTANEOUS at 17:00

## 2024-06-30 RX ADMIN — DONEPEZIL HYDROCHLORIDE 10 MG: 5 TABLET, FILM COATED ORAL at 08:51

## 2024-06-30 RX ADMIN — BRIMONIDINE TARTRATE, TIMOLOL MALEATE 1 DROP: 2; 5 SOLUTION/ DROPS OPHTHALMIC at 20:45

## 2024-06-30 RX ADMIN — Medication 5 MG: at 01:51

## 2024-06-30 RX ADMIN — METOPROLOL SUCCINATE 25 MG: 25 TABLET, EXTENDED RELEASE ORAL at 17:04

## 2024-06-30 RX ADMIN — ARIPIPRAZOLE 20 MG: 10 TABLET ORAL at 20:42

## 2024-06-30 RX ADMIN — GABAPENTIN 1200 MG: 300 CAPSULE ORAL at 08:51

## 2024-06-30 RX ADMIN — LAMOTRIGINE 300 MG: 150 TABLET ORAL at 20:43

## 2024-06-30 RX ADMIN — ASPIRIN 81 MG: 81 TABLET, COATED ORAL at 08:51

## 2024-06-30 RX ADMIN — MONTELUKAST 10 MG: 10 TABLET, FILM COATED ORAL at 20:42

## 2024-06-30 RX ADMIN — AMLODIPINE BESYLATE 5 MG: 5 TABLET ORAL at 16:14

## 2024-06-30 RX ADMIN — ENOXAPARIN SODIUM 40 MG: 40 INJECTION SUBCUTANEOUS at 16:14

## 2024-06-30 RX ADMIN — INSULIN ASPART 1 UNITS: 100 INJECTION, SOLUTION INTRAVENOUS; SUBCUTANEOUS at 08:52

## 2024-06-30 RX ADMIN — GABAPENTIN 1200 MG: 300 CAPSULE ORAL at 20:42

## 2024-06-30 RX ADMIN — INSULIN GLARGINE 25 UNITS: 100 INJECTION, SOLUTION SUBCUTANEOUS at 08:52

## 2024-06-30 RX ADMIN — VENLAFAXINE HYDROCHLORIDE 225 MG: 150 CAPSULE, EXTENDED RELEASE ORAL at 08:51

## 2024-06-30 RX ADMIN — CLOPIDOGREL BISULFATE 75 MG: 75 TABLET ORAL at 08:51

## 2024-06-30 RX ADMIN — ATORVASTATIN CALCIUM 10 MG: 10 TABLET, FILM COATED ORAL at 20:42

## 2024-06-30 RX ADMIN — METOPROLOL SUCCINATE 25 MG: 25 TABLET, EXTENDED RELEASE ORAL at 11:34

## 2024-06-30 RX ADMIN — INSULIN ASPART 1 UNITS: 100 INJECTION, SOLUTION INTRAVENOUS; SUBCUTANEOUS at 11:59

## 2024-06-30 RX ADMIN — ESCITALOPRAM OXALATE 20 MG: 20 TABLET ORAL at 20:42

## 2024-06-30 RX ADMIN — BRIMONIDINE TARTRATE, TIMOLOL MALEATE 1 DROP: 2; 5 SOLUTION/ DROPS OPHTHALMIC at 08:53

## 2024-06-30 RX ADMIN — TRAZODONE HYDROCHLORIDE 300 MG: 100 TABLET ORAL at 20:43

## 2024-06-30 RX ADMIN — AMLODIPINE BESYLATE 5 MG: 5 TABLET ORAL at 11:34

## 2024-06-30 RX ADMIN — OLANZAPINE 10 MG: 5 TABLET, FILM COATED ORAL at 20:42

## 2024-06-30 RX ADMIN — HYDRALAZINE HYDROCHLORIDE 10 MG: 20 INJECTION INTRAMUSCULAR; INTRAVENOUS at 20:37

## 2024-06-30 ASSESSMENT — ACTIVITIES OF DAILY LIVING (ADL)
ADLS_ACUITY_SCORE: 39
ADLS_ACUITY_SCORE: 40
ADLS_ACUITY_SCORE: 40
ADLS_ACUITY_SCORE: 39
ADLS_ACUITY_SCORE: 40
ADLS_ACUITY_SCORE: 39
ADLS_ACUITY_SCORE: 39
ADLS_ACUITY_SCORE: 40
ADLS_ACUITY_SCORE: 39
ADLS_ACUITY_SCORE: 40
ADLS_ACUITY_SCORE: 39
ADLS_ACUITY_SCORE: 40
ADLS_ACUITY_SCORE: 39
ADLS_ACUITY_SCORE: 40

## 2024-06-30 NOTE — PLAN OF CARE
Physical Therapy Discharge Summary    Reason for therapy discharge:    Discharged to home with home therapy.    Progress towards therapy goal(s). See goals on Care Plan in [facility] electronic health record for goal details.  Goals met    Therapy recommendation(s):    Continued therapy is recommended.  Rationale/Recommendations:  Pt would benefit from ongoing strength, balance and gait work.

## 2024-06-30 NOTE — PLAN OF CARE
"PRIMARY DIAGNOSIS: \"GENERIC\" NURSING  OUTPATIENT/OBSERVATION GOALS TO BE MET BEFORE DISCHARGE:  ADLs back to baseline: Yes    Activity and level of assistance: Up with standby assistance.    Pain status: Pain free.    Return to near baseline physical activity: Yes     Discharge Planner Nurse   Safe discharge environment identified: Yes  Barriers to discharge: Yes       Entered by: Marcella Donovan RN 06/30/2024 2:52 PM     Please review provider order for any additional goals.   Nurse to notify provider when observation goals have been met and patient is ready for discharge.Goal Outcome Evaluation:       Patient denies pain this shift.  Blood sugars covered per orders.  BP's continue to run high.  Bed alarm in place, call light within reach.  SBA.                   "

## 2024-06-30 NOTE — SIGNIFICANT EVENT
"Nurse went in there to pt room to assess and administer medication. After assessment pt refused to take medications and asked the nurse \" Do you know what you doing? Why are you giving medications that look different than what I take. Please leave the room and bring another nurse.\" Charge nurse was notified.   "

## 2024-06-30 NOTE — PLAN OF CARE
Occupational Therapy Discharge Summary    Reason for therapy discharge:    All goals and outcomes met, no further needs identified.    Progress towards therapy goal(s). See goals on Care Plan in [facility] electronic health record for goal details.  Goals met    Therapy recommendation(s):    No further therapy is recommended.    SHAILESH Greene/L. 6/30/2024, 9:49 AM

## 2024-06-30 NOTE — PLAN OF CARE
"PRIMARY DIAGNOSIS: \"GENERIC\" NURSING  OUTPATIENT/OBSERVATION GOALS TO BE MET BEFORE DISCHARGE:  ADLs back to baseline: Yes    Activity and level of assistance: Up with standby assistance.    Pain status: Pain free.    Return to near baseline physical activity: Yes     Discharge Planner Nurse   Safe discharge environment identified: Yes  Barriers to discharge: No       Entered by: Marcella Donovan RN 06/30/2024 2:31 PM     Please review provider order for any additional goals.   Nurse to notify provider when observation goals have been met and patient is ready for discharge.Goal Outcome Evaluation:                        "

## 2024-06-30 NOTE — PROGRESS NOTES
OT [place]     06/30/24 0900   Appointment Info   Signing Clinician's Name / Credentials (OT) Bettie Hinton, OTR/L   Quick Adds   Quick Adds Certification   Living Environment   People in Home spouse;parent(s)   Current Living Arrangements apartment   Home Accessibility no concerns   Living Environment Comments Pt lives w/ wife and M.I.L, senior apt, owns shower chair (doesn't typically use), drop down shower head.   Self-Care   Current Activity Tolerance moderate   Equipment Currently Used at Home grab bar, toilet;shower chair;walker, rolling;other (see comments)   Activity/Exercise/Self-Care Comment Owns 4WW, per pt doesn't use it all the time. IND ADLs   Instrumental Activities of Daily Living (IADL)   IADL Comments A from wife IADLs - cooking, cleaning   General Information   Onset of Illness/Injury or Date of Surgery 06/28/24   Referring Physician Nickolas Martinez, MICHAEL   Patient/Family Therapy Goal Statement (OT) Go home   Additional Occupational Profile Info/Pertinent History of Current Problem Montez Cabrera is a 73 year old male with PMHx significant for T2DM, HLD, Depression, hx of TIA (2021), anxiety, Biploar disorder.  He presented to [facility] ER 6/28/2024 for evaluation of new onset unsteady gait, dizziness, and diplopia this morning.  Seen by neurology in the ER.  Patient was admitted for observation of possible TIA versus CVA.   Existing Precautions/Restrictions fall   Cognitive Status Examination   Orientation Status orientation to person, place and time   Affect/Mental Status (Cognitive) WFL   Visual Perception   Visual Impairment/Limitations WFL   Impact of Vision Impairment on Function (Vision) Pt reporting diplopia hsa resolved.   Sensory   Sensory Quick Adds sensation intact   Range of Motion Comprehensive   General Range of Motion no range of motion deficits identified   Strength Comprehensive (MMT)   General Manual Muscle Testing (MMT) Assessment no strength deficits identified   Coordination    Upper Extremity Coordination No deficits were identified   Transfers   Transfers sit-stand transfer   Sit-Stand Transfer   Sit-Stand [place] (Transfers) supervision   Assistive Device (Sit-Stand Transfers) walker, front-wheeled   Balance   Balance Assessment sitting static balance;standing static balance   Sitting Balance: Static supervision   Position, Sitting Balance sitting in chair   Standing Balance: Static supervision   Position/Device Used, Standing Balance walker, front-wheeled   Activities of Daily Living   BADL Assessment/Intervention lower body dressing;toileting   Lower Body Dressing Assessment/Training   Comment, (Lower Body Dressing) Did not directly observe, per pt, LB dressing has been more challenging at home. Per clinical reasoning, pt likely SBA w/ AE for LB dressing   Toileting   Comment, (Toileting) Did not directly observe today, per clinical reasoning, pt likely SBA for transfer, clothing/hygiene at this time.   Clinical Impression   Criteria for Skilled Therapeutic Interventions Met (OT) Yes, treatment indicated   OT Diagnosis Impaired ADLs   Influenced by the following impairments Deconditioning   OT Problem List-Impairments impacting ADL problems related to;activity tolerance impaired;mobility   Assessment of Occupational Performance 1-3 Performance Deficits   Planned Therapy Interventions (OT) ADL retraining;home program guidelines   Clinical Decision Making Complexity (OT) problem focused assessment/low complexity   Risk & Benefits of therapy have been explained evaluation/treatment results reviewed;care plan/treatment goals reviewed;patient   OT Total Evaluation Time   OT Eval, Low Complexity Minutes (34166) 8   Therapy Certification   Medical Diagnosis Diplopia   Start of Care Date 06/30/24   Certification date from 06/30/24   Certification date to 06/30/24   OT Goals   Therapy Frequency (OT) One time eval and treatment   OT Predicted Duration/Target Date for Goal Attainment  06/30/24   OT Goals Lower Body Dressing;OT Goal 1   OT: Lower Body Dressing Modified independent;using adaptive equipment   OT: Goal 1 Patient will demonstrate and verbalize understanding towards therapist recommendations and home program guidelines.   Self-Care/Home Management   Self-Care/Home Mgmt/ADL, Compensatory, Meal Prep Minutes (86147) 18   Symptoms Noted During/After Treatment (Meal Preparation/Planning Training) none   Treatment Detail/Skilled Intervention Eval complete, treatment indicated and initiated. Pt completed additional functional mobility~25'  within room SBA FWW, reporting no symptoms. OT rec'ing pt use AD at home after DC, educating pt on other fall prevention and energy conservation guidelines, pt receptive towards education. Pt reporting increasing difficulty w/ LB dressing recently; OT giving pt recs/education on using step stool and/or other AE (reacher) to increase IND w/ task - verbal education and demo, pt verbalizing understanding. Pt expressing concern w/ IADLs: OT educating on general task modification guidelines to manage fatigue/balance during more exerting tasks, pt reporting his spouse feels overwhelmed with doing the majority of IADLs in their household - OT rec'ing pt reach out to social work regarding additional resource ideas if applicable. Pt reporting no other concerns for DC home, feels supported by spouse/family. Session ended w/ pt in recliner w chair alarm.   OT Discharge Planning   OT Plan DC OT   OT Discharge Recommendation (DC Rec) home with assist   OT Rationale for DC Rec Pt at/near baseline, has accessible home w/ supportive family. Pt reports he feels safe to return home, demo'd functional mobility today SBA-Mod IND   OT Brief overview of current status SBA-Mod IND   Total Session Time   Timed Code Treatment Minutes 18   Total Session Time (sum of timed and untimed services) 26    [facility]  OUTPATIENT OCCUPATIONAL THERAPY   EVALUATION  PLAN OF TREATMENT FOR OUTPATIENT REHABILITATION  (COMPLETE FOR INITIAL CLAIMS ONLY)  Patient's Last Name, First Name, M.I.  YOB: 1950  Montez Cabrera                          Provider's Name  [facility] Medical Record No.  [id number]                             Onset Date:  06/28/24   Start of Care Date:  06/30/24   Type:     ___PT   _X_OT   ___SLP Medical Diagnosis:  Diplopia                    OT Diagnosis:  Impaired ADLs Visits from SOC:  1     See note for plan of treatment, functional goals and certification details    I CERTIFY THE NEED FOR THESE SERVICES FURNISHED UNDER        THIS PLAN OF TREATMENT AND WHILE UNDER MY CARE     (Physician co-signature of this document indicates review and certification of the therapy plan).

## 2024-06-30 NOTE — PLAN OF CARE
Problem: Stroke, Ischemic (Includes Transient Ischemic Attack)  Goal: Optimal Functional Ability  Intervention: Optimize Functional Ability  Recent Flowsheet Documentation  Taken 6/29/2024 2058 by Cristiano Villa RN  Activity Management: activity encouraged  Goal: Effective Oxygenation and Ventilation  Intervention: Optimize Oxygenation and Ventilation  Recent Flowsheet Documentation  Taken 6/29/2024 2058 by Cristiano Villa RN  Head of Bed (HOB) Positioning: HOB at 20-30 degrees  Goal: Improved Sensorimotor Function  Intervention: Optimize Range of Motion, Motor Control and Function  Recent Flowsheet Documentation  Taken 6/29/2024 2058 by Cristiano Villa RN  Positioning/Transfer Devices:   pillows   in use   Goal Outcome Evaluation:       Pt is A/Ox4, able to make needs known, on room air, SBA, takes meds whole. Pt asked the nurse to leave the room and refused all medications. Charge nurse was notified.

## 2024-06-30 NOTE — PROGRESS NOTES
[facility]    Medicine Progress Note - Hospitalist Service    Date of Admission:  6/28/2024    Assessment & Plan      Montez Cabrera is a 73 year old male with PMHx significant for T2DM, HLD, Depression, hx of TIA (2021), anxiety, Biploar disorder.  He presented to [facility] ER 6/28/2024 for evaluation of new onset unsteady gait, dizziness, and diplopia this morning.  Seen by neurology in the ER.  Patient was admitted for observation of possible TIA versus CVA.        6/30 :       Stable neuro status  Neurology signed off  Plan for baby aspirin and plavix - for 3 months - following which - aspirin 325 mg daily    BP significantly elevated today at SBP at 190's despite giving metoprolol XL 25 mg daily and norvasc 5 mg daily - at 10 am  Will given another dose of metoprolol XL 25 mg daily, norvasc 5 mg daily    Lovenox for DVT prevention    Not medically ready for discharge at this time.  Significantly elevated BP associated with possible TIA  Discharge in am if BP stable to home  Discussed with family      A/p :       Left parotid tail mass     Suspected CVA/TIA  Presents with new unsteady gait, dizziness and double vision,   found on exam with new left facial droop, left arm ataxia, left leg weakness  Previous TIA 2021 with residual right facial droop  CT head, CTA,  no acute findings, per stroke neurology concern is for brainstem versus right hemiparetic infarct  Brain MRI recommended, eventually no acute stroke seen on MRI  Pending neurology evaluation this morning  Stroke order set placed, echo results pending  Continue aspirin 81 daily, continue statin therapy, continue neurochecks  Await neuroevaluation today, PT OT evaluation     Elevated blood pressure noted  No previous history of hypertension  Not on any Home meds  Permissive hypertension for now  Can use labetalol if needed for sustained elevation  Might need blood pressure meds at discharge, suggest ACE inhibitors due to renal  disease and diabetes    #DM type II  Hgb A1C 7.5. Home regimen includes Lantus 25 units every morning, metformin 500 mg twice daily, Ozempic 2 mg every Sunday.  -Resume Lantus, cleared by speech therapy this morning  - Medium intensity sliding scale   - Hold PTA Metformin. Give Ozempic if still here on Sunday  - QID BG checks, hypoglycemia protocol      # Dementia   Somewhat recent diagnosis per patient's wife.  Per patient's wife, he has difficulty with short-term memory and she would like to be around for any conversations or at least see daily updates.  - PTA Aricept  -Please update pt's wife daily      #Parotid mass, incidental finding  CTA of the head noted a 2.1 cm left parotid tail mass mildly enlarged versus 2022.  - ENT referral recommended, MRI results confirm above, will update wife     # Asthma   Tolerating RA but has mild wheezing on auscultation.  - Continue PTA inhalers, Singulair     #Depression  #Bipolar disorder  -PTA Effexor 225 mg daily, Abilify 20 mg nightly, Lexapro 20 mg nightly, Zyprexa 10 mg nightly, and trazodone 300 mg nightly  -PTA gabapentin 1200 mg twice daily  -PTA lamotrigine 300 mg every evening     ANAMARIA noted on admission, this is resolved  Continue p.o. hydration  Follow BMP closely     #Cataracts  - Continue PTA eyedrops           Diet: Moderate Consistent Carb (60 g CHO per Meal) Diet    DVT Prophylaxis: Low Risk/Ambulatory with no VTE prophylaxis indicated  Castillo Catheter: Not present  Lines: None     Cardiac Monitoring: ACTIVE order. Indication: Stroke, acute (48 hours)  Code Status: Full Code      Clinically Significant Risk Factors Present on Admission                # Drug Induced Platelet Defect: home medication list includes an antiplatelet medication   # Hypertension: Noted on problem list            # DMII: A1C = 7.5 % (Ref range: <5.7 %) within past 6 months    # Overweight: Estimated body mass index is 29.97 kg/m  as calculated from the following:    Height as of this  "encounter: 1.626 m (5' 4\").    Weight as of this encounter: 79.2 kg (174 lb 9.7 oz).       # Asthma: noted on problem list        Disposition Plan     Medically Ready for Discharge: Anticipated in 2-4 Days             Je Germain MD  Hospitalist Service  [facility]  Securely message with [employer] (more info)  Text page via [employer] Paging/Directory   ______________________________________________________________________    Interval History   Mild dizziness, still has double vision,  no headaches    Physical Exam   Vital Signs: Temp: 98  F (36.7  C) Temp src: Oral BP: (!) 175/81 Pulse: 79   Resp: 18 SpO2: 96 % O2 Device: None (Room air)    Weight: 174 lbs 9.67 oz    General Appearance: AAOx3  Respiratory: Clear anteriorly  Cardiovascular: S1-S2, no murmur  GI: Soft nontender  Skin: No edema  Neuro with 5/5 power all 4 extremities, ambulation not tested,    Medical Decision Making       3 5 MINUTES SPENT BY ME on the date of service doing chart review, history, exam, documentation & further activities per the note.      Data   "

## 2024-06-30 NOTE — PROGRESS NOTES
"PRIMARY DIAGNOSIS: \"GENERIC\" NURSING  OUTPATIENT/OBSERVATION GOALS TO BE MET BEFORE DISCHARGE:  ADLs back to baseline: No    Activity and level of assistance: Up with standby assistance.    Pain status: Pain free.    Return to near baseline physical activity: Yes     Discharge Planner Nurse   Safe discharge environment identified: Yes  Barriers to discharge: Yes       Entered by: Cristiano Villa RN 06/30/2024 5:53 AM     Please review provider order for any additional goals.   Nurse to notify provider when observation goals have been met and patient is ready for discharge.  "

## 2024-07-01 VITALS
HEIGHT: 64 IN | TEMPERATURE: 97.7 F | DIASTOLIC BLOOD PRESSURE: 67 MMHG | SYSTOLIC BLOOD PRESSURE: 124 MMHG | RESPIRATION RATE: 20 BRPM | WEIGHT: 174.6 LBS | BODY MASS INDEX: 29.81 KG/M2 | HEART RATE: 63 BPM | OXYGEN SATURATION: 94 %

## 2024-07-01 LAB
GLUCOSE BLDC GLUCOMTR-MCNC: 118 MG/DL (ref 70–99)
GLUCOSE BLDC GLUCOMTR-MCNC: 128 MG/DL (ref 70–99)
GLUCOSE BLDC GLUCOMTR-MCNC: 163 MG/DL (ref 70–99)

## 2024-07-01 PROCEDURE — 250N000013 HC RX MED GY IP 250 OP 250 PS 637: Performed by: INTERNAL MEDICINE

## 2024-07-01 PROCEDURE — G0378 HOSPITAL OBSERVATION PER HR: HCPCS

## 2024-07-01 PROCEDURE — 250N000013 HC RX MED GY IP 250 OP 250 PS 637: Performed by: STUDENT IN AN ORGANIZED HEALTH CARE EDUCATION/TRAINING PROGRAM

## 2024-07-01 PROCEDURE — 99239 HOSP IP/OBS DSCHRG MGMT >30: CPT | Performed by: INTERNAL MEDICINE

## 2024-07-01 PROCEDURE — 82962 GLUCOSE BLOOD TEST: CPT

## 2024-07-01 RX ORDER — METOPROLOL SUCCINATE 50 MG/1
50 TABLET, EXTENDED RELEASE ORAL DAILY
Qty: 60 TABLET | Refills: 1 | Status: SHIPPED | OUTPATIENT
Start: 2024-07-01

## 2024-07-01 RX ADMIN — VENLAFAXINE HYDROCHLORIDE 225 MG: 150 CAPSULE, EXTENDED RELEASE ORAL at 08:15

## 2024-07-01 RX ADMIN — METOPROLOL SUCCINATE 50 MG: 50 TABLET, EXTENDED RELEASE ORAL at 08:13

## 2024-07-01 RX ADMIN — INSULIN GLARGINE 25 UNITS: 100 INJECTION, SOLUTION SUBCUTANEOUS at 08:19

## 2024-07-01 RX ADMIN — INSULIN ASPART 1 UNITS: 100 INJECTION, SOLUTION INTRAVENOUS; SUBCUTANEOUS at 11:54

## 2024-07-01 RX ADMIN — DONEPEZIL HYDROCHLORIDE 10 MG: 5 TABLET, FILM COATED ORAL at 08:11

## 2024-07-01 RX ADMIN — ASPIRIN 81 MG: 81 TABLET, COATED ORAL at 08:16

## 2024-07-01 RX ADMIN — CLOPIDOGREL BISULFATE 75 MG: 75 TABLET ORAL at 08:15

## 2024-07-01 RX ADMIN — BRIMONIDINE TARTRATE, TIMOLOL MALEATE 1 DROP: 2; 5 SOLUTION/ DROPS OPHTHALMIC at 08:16

## 2024-07-01 RX ADMIN — GABAPENTIN 1200 MG: 300 CAPSULE ORAL at 08:13

## 2024-07-01 RX ADMIN — AMLODIPINE BESYLATE 10 MG: 5 TABLET ORAL at 08:18

## 2024-07-01 ASSESSMENT — ACTIVITIES OF DAILY LIVING (ADL)
ADLS_ACUITY_SCORE: 36
ADLS_ACUITY_SCORE: 36
ADLS_ACUITY_SCORE: 40
ADLS_ACUITY_SCORE: 36

## 2024-07-01 NOTE — PLAN OF CARE
"PRIMARY DIAGNOSIS: \"GENERIC\" NURSING  OUTPATIENT/OBSERVATION GOALS TO BE MET BEFORE DISCHARGE:  ADLs back to baseline: Yes    Activity and level of assistance: Up with standby assistance.    Pain status: Pain free.    Return to near baseline physical activity: Yes     Discharge Planner Nurse   Safe discharge environment identified: Yes  Barriers to discharge: Yes       Entered by: Saumya Butterfield RN 06/30/2024 10:46 PM   VSS except blood pressure of 197/93 around 1940 despite one time doses of B/P meds given earlier. MD updated and PRN hydralazine was ordered and given. Plan to monitor and F/U with VS.   Please review provider order for any additional goals.   Nurse to notify provider when observation goals have been met and patient is ready for discharge.Goal Outcome Evaluation:                        " Once fax received will put in chart.

## 2024-07-01 NOTE — DISCHARGE SUMMARY
[facility]  Hospitalist Discharge Summary      Date of Admission:  6/28/2024  Date of Discharge:  7/1/2024  Discharging Provider: Je Germain MD  Discharge Service: Hospitalist Service    Discharge Diagnoses         Montez Cabrera is a 73 year old male with PMHx significant for T2DM, HLD, Depression, hx of TIA (2021), anxiety, Biploar disorder.  He presented to [facility] ER 6/28/2024 for evaluation of new onset unsteady gait, dizziness, and diplopia this morning.  Seen by neurology in the ER.  Patient was admitted for observation of possible TIA versus CVA.               7/1 :     BP readings have improved  Medically ready  Discharge home today with home health         A/p :              Suspected CVA/TIA    Presents with new unsteady gait, dizziness and double vision,   found on exam with new left facial droop, left arm ataxia, left leg weakness  Previous TIA 2021 with residual right facial droop  CT head, CTA,  no acute findings, per stroke neurology concern is for brainstem versus right hemiparetic infarct  Brain MRI recommended, eventually no acute stroke seen on MRI  Pending neurology evaluation this morning  Stroke order set placed, echo results pending  Continue baby aspirin, plavix for 3 months - then aspirin 325 mg daily, continue statin  Neuro  status stable  Discharge home today with home health       Elevated blood pressure/ HTN, Essential    No previous history of hypertension  Not on any Home meds  Permissive hypertension for now  Can use labetalol if needed for sustained elevation  Might need blood pressure meds at discharge, suggest ACE inhibitors due to renal disease and diabetes  Started on norvasc, metoprolol XL at discharge  BP stable     #DM type II  Hgb A1C 7.5. Home regimen includes Lantus 25 units every morning, metformin 500 mg twice daily, Ozempic 2 mg every Sunday.  -Resume Lantus, cleared by speech therapy this morning  - Medium intensity sliding scale   -  "Hold PTA Metformin. Give Ozempic if still here on Sunday  - QID BG checks, hypoglycemia protocol      # Dementia   Somewhat recent diagnosis per patient's wife.  Per patient's wife, he has difficulty with short-term memory and she would like to be around for any conversations or at least see daily updates.  - PTA Aricept  -Please update pt's wife daily      #Parotid mass, incidental finding  CTA of the head noted a 2.1 cm left parotid tail mass mildly enlarged versus 2022.  - ENT referral recommended, MRI results confirm above, will update wife     # Asthma   Tolerating RA but has mild wheezing on auscultation.  - Continue PTA inhalers, Singulair     #Depression  #Bipolar disorder  -PTA Effexor 225 mg daily, Abilify 20 mg nightly, Lexapro 20 mg nightly, Zyprexa 10 mg nightly, and trazodone 300 mg nightly  -PTA gabapentin 1200 mg twice daily  -PTA lamotrigine 300 mg every evening     ANAMARIA noted on admission, this is resolved  Continue p.o. hydration  Follow BMP closely     #Cataracts  - Continue PTA eyedrops      Clinically Significant Risk Factors     # DMII: A1C = 7.5 % (Ref range: <5.7 %) within past 6 months  # Overweight: Estimated body mass index is 29.97 kg/m  as calculated from the following:    Height as of this encounter: 1.626 m (5' 4\").    Weight as of this encounter: 79.2 kg (174 lb 9.7 oz).       Follow-ups Needed After Discharge   Follow-up Appointments     Follow-up and recommended labs and tests       Follow up with primary care provider, Lori Cerrato, within 7   days for hospital follow- up.  No follow up labs or test are needed.    Follow up with Neurology       As advised        {Additional follow-up instructions/to-do's for PCP    : none    Unresulted Labs Ordered in the Past 30 Days of this Admission       No orders found from 5/29/2024 to 6/29/2024.        These results will be followed up by PCP    Discharge Disposition   Discharged to home  Condition at discharge: " Stable        Consultations This Hospital Stay   SPEECH LANGUAGE PATH ADULT IP CONSULT  NEUROLOGY IP STROKE CONSULT  PHARMACY IP CONSULT  PHARMACY IP CONSULT  PHARMACY IP CONSULT  PHYSICAL THERAPY ADULT IP CONSULT  OCCUPATIONAL THERAPY ADULT IP CONSULT  REHAB ADMISSIONS LIAISON IP CONSULT  CARE MANAGEMENT / SOCIAL WORK IP CONSULT    Code Status   Full Code    Time Spent on this Encounter   I, Je Germain MD, personally saw the patient today and spent greater than 30 minutes discharging this patient.       Je Germain MD  [address] [address]  Phone: [phone]  Fax: [phone]  ______________________________________________________________________    Physical Exam   Vital Signs: Temp: 98.2  F (36.8  C) Temp src: Oral BP: (!) 168/83 Pulse: 72   Resp: 20 SpO2: 99 % O2 Device: None (Room air)    Weight: 174 lbs 9.67 oz       GENERAL: The patient is not in any acute distressed. Awake and alert.  HEENT: Nonicteric sclerae, PERRLA, EOMI. Oropharynx clear. Moist mucous membranes. Conjunctivae appear well perfused.  HEART: Regular rate and rhythm without murmurs.  LUNGS: Clear to auscultation bilaterally. No wheezing or crackles.  ABDOMEN: Soft, positive bowel sounds, nontender.  SKIN: No rash, no excessive bruising, petechiae, or purpura.  EXTREMITIES : no rashes, no swelling in legs.  NEUROLOGIC:  alert and oriented x 3 cranial nerves II through XII are intact except patient has nystagmus on end gaze. He has normal mass and tone with 5/5 strength reflexes trace positive toes downgoing. Sensation intact to light touch and pinprick in lower extremities no dysmetria noted on finger-nose testing. Gait testing deferred   ROS: All other systems negative          Primary Care Physician   Lori Moise Serum    Discharge Orders      Home Care Referral      Reason for your hospital stay    Dizziness, diplopia     Follow-up and recommended labs and tests      Follow up with primary care provider, Lori Cerrato, within 7 days for hospital follow- up.  No follow up labs or test are needed.    Follow up with Neurology       As advised     Activity    Your activity upon discharge: activity as tolerated     Diet    Follow this diet upon discharge: Orders Placed This Encounter      Moderate Consistent Carb (60 g CHO per Meal) Diet       Significant Results and Procedures   Most Recent 3 CBC's:  Recent Labs   Lab Test 06/29/24  0731 06/28/24  1428 01/06/23  0837   WBC 9.8 6.8 9.9   HGB 11.6* 10.3* 11.1*   MCV 99 100 101*    231 306     Most Recent 3 BMP's:  Recent Labs   Lab Test 07/01/24  1150 07/01/24  0813 07/01/24  0337 06/29/24  0755 06/29/24  0731 06/28/24  1858 06/28/24  1428 01/06/23  1231 01/06/23  0837   NA  --   --   --   --  141  --  136  --  138   POTASSIUM  --   --   --   --  4.7  --  4.7  --  5.2   CHLORIDE  --   --   --   --  105  --  100  --  102   CO2  --   --   --   --  29  --  27  --  27   BUN  --   --   --   --  14.8  --  20.4  --  25.2*   CR  --   --   --   --  1.05  --  1.27*  --  1.25*   ANIONGAP  --   --   --   --  7  --  9  --  9   JAYME  --   --   --   --  8.8  --  8.3*  --  9.1   * 118* 128*   < > 109*   < > 293*   < > 132*    < > = values in this interval not displayed.     Most Recent 2 LFT's:  Recent Labs   Lab Test 01/04/23  1346 12/16/21  1127   AST 22 37   ALT 22 21   ALKPHOS 106 95   BILITOTAL 0.2 0.3     Most Recent 3 INR's:  Recent Labs   Lab Test 06/28/24  1428 01/04/23  1650 12/30/22  1935   INR 1.10 1.08 0.93       Discharge Medications   Current Discharge Medication List        START taking these medications    Details   amLODIPine (NORVASC) 5 MG tablet Take 1 tablet (5 mg) by mouth daily Hold for SBP < 100 or DBP < 60  Qty: 60 tablet, Refills: 1    Associated Diagnoses: TIA (transient ischemic attack)      metoprolol succinate ER (TOPROL XL) 50 MG 24 hr tablet Take 1 tablet (50 mg) by mouth daily Hold for SBP < 100  or DBP < 60 or HR < 50  Qty: 60 tablet, Refills: 1    Associated Diagnoses: TIA (transient ischemic attack)           CONTINUE these medications which have CHANGED    Details   !! aspirin (ASA) 325 MG EC tablet Take 1 tablet (325 mg) by mouth daily  Qty: 30 tablet, Refills: 1    Associated Diagnoses: TIA (transient ischemic attack)      !! aspirin 81 MG EC tablet Take 1 tablet (81 mg) by mouth daily for 90 days    Associated Diagnoses: TIA (transient ischemic attack)      clopidogrel (PLAVIX) 75 MG tablet Take 1 tablet (75 mg) by mouth daily for 90 days    Associated Diagnoses: TIA (transient ischemic attack)       !! - Potential duplicate medications found. Please discuss with provider.        CONTINUE these medications which have NOT CHANGED    Details   albuterol (PROAIR HFA/PROVENTIL HFA/VENTOLIN HFA) 108 (90 Base) MCG/ACT inhaler Inhale 2 puffs into the lungs 2 times daily as needed for shortness of breath / dyspnea or wheezing    Comments: Pharmacy may dispense brand covered by insurance (Proair, or proventil or ventolin or generic albuterol inhaler)      ARIPiprazole (ABILIFY) 20 MG tablet Take 20 mg by mouth every evening      atorvastatin (LIPITOR) 10 MG tablet Take 10 mg by mouth every evening      brimonidine-timolol (COMBIGAN) 0.2-0.5 % ophthalmic solution Place 1 drop into both eyes 2 times daily      donepezil (ARICEPT) 10 MG tablet Take 10 mg by mouth daily      escitalopram (LEXAPRO) 20 MG tablet Take 20 mg by mouth every evening      gabapentin (NEURONTIN) 400 MG capsule Take 1,200 mg by mouth 2 times daily      insulin glargine (LANTUS PEN) 100 UNIT/ML pen Inject 25 Units Subcutaneous every morning      lamoTRIgine (LAMICTAL) 100 MG tablet Take 300 mg by mouth every evening      metFORMIN (GLUCOPHAGE-XR) 500 MG 24 hr tablet Take 500 mg by mouth 2 times daily (with meals)      montelukast (SINGULAIR) 10 MG tablet Take 10 mg by mouth At Bedtime      OLANZapine (ZYPREXA) 10 MG tablet Take 10 mg by  mouth At Bedtime      semaglutide (OZEMPIC, 0.25 OR 0.5 MG/DOSE,) 2 MG/1.5ML SOPN pen Inject 2 mg Subcutaneous every 7 days      traZODone (DESYREL) 150 MG tablet Take 300 mg by mouth At Bedtime      venlafaxine (EFFEXOR-XR) 75 MG 24 hr capsule Take 225 mg by mouth daily           Allergies   Allergies   Allergen Reactions    Bupropion Diarrhea    Erythromycin Diarrhea    Theophylline GI Disturbance

## 2024-07-01 NOTE — PROGRESS NOTES
"PRIMARY DIAGNOSIS: \"GENERIC\" NURSING  OUTPATIENT/OBSERVATION GOALS TO BE MET BEFORE DISCHARGE:  ADLs back to baseline: Yes    Activity and level of assistance: Ambulating independently.    Pain status: Pain free.    Return to near baseline physical activity: Yes     Discharge Planner Nurse   Safe discharge environment identified: Yes  Barriers to discharge: No       Entered by: Donny Ferraro RN 07/01/2024 12:00 PM     Please review provider order for any additional goals.   Nurse to notify provider when observation goals have been met and patient is ready for discharge.  "

## 2024-07-01 NOTE — PROGRESS NOTES
Care Management Follow Up    Length of Stay (days): 0    Expected Discharge Date: 07/01/2024    Anticipated Discharge Plan:       Transportation: Anticipate Family/friend    PT Recommendations: home with assist, home with home care physical therapy  OT Recommendations:  home with assist     Barriers to Discharge: medical stability    Prior Living Situation:   with      Advanced Directive on File:       Patient/Spokesperson Updated: Yes. Who? Pt and wife    Additional Information:  CM met with pt in room to discuss discharge planning. Therapy rec is home care PT, pt states he is agreeable and does not have agency preference. Home care RN and PT referrals sent. Pt accepted by [facility].     Jovana Borjas, ANNAMAR[place]

## 2024-07-01 NOTE — PROGRESS NOTES
Care Management Discharge Note    Discharge Date: 07/01/2024       Discharge Disposition:  Home    Discharge Services:  Home RN, PT    Additional Information:  Pt discharging home with [facility] RN, PT. Wife to transport.     ANNAMARIE StocktonW

## 2024-07-01 NOTE — DISCHARGE INSTRUCTIONS
Home care services have been arranged for the patient.  Home Care Agency: [facility]  Home Care Phone Number: [phone]   Services: Skilled Nursing and Physical Therapy  Instructions: Home Care will call to arrange first visit.

## 2024-07-01 NOTE — PLAN OF CARE
Goal Outcome Evaluation: Pt alert and oriented X4. Able to make his needs known. . VSS. Denies pain, sob.

## 2024-07-01 NOTE — PLAN OF CARE
"PRIMARY DIAGNOSIS: \"GENERIC\" NURSING  OUTPATIENT/OBSERVATION GOALS TO BE MET BEFORE DISCHARGE:  ADLs back to baseline: Yes    Activity and level of assistance: Up with standby assistance.    Pain status: Pain free.    Return to near baseline physical activity: Yes     Discharge Planner Nurse   Safe discharge environment identified: Yes    Barriers to discharge: Yes       Entered by: Kathi Hutton RN 07/01/2024 12:13 AM     Please review provider order for any additional goals.   Nurse to notify provider when observation goals have been met and patient is ready for discharge.                        "

## 2024-07-01 NOTE — PLAN OF CARE
"PRIMARY DIAGNOSIS: \"GENERIC\" NURSING  OUTPATIENT/OBSERVATION GOALS TO BE MET BEFORE DISCHARGE:  ADLs back to baseline: Yes    Activity and level of assistance: Up with standby assistance.    Pain status: Pain free.    Return to near baseline physical activity: Yes     Discharge Planner Nurse   Safe discharge environment identified: Yes  Barriers to discharge: Yes       Entered by: Saumya Butterfield RN 06/30/2024 10:20 PM   Pt is alert oriented and currently visiting with wife. Discharge on hold due to high B/P one time dose of amlodipine and lopresser ordered to be given this darcie. Plan of care ongoing.   Please review provider order for any additional goals.   Nurse to notify provider when observation goals have been met and patient is ready for discharge.Goal Outcome Evaluation:                        "

## 2024-07-01 NOTE — PROGRESS NOTES
Pt discharged to home with home health care accompanied by wife and writer. AVS reviewed and belongings sent with patient.

## 2024-07-01 NOTE — PLAN OF CARE
"PRIMARY DIAGNOSIS: \"GENERIC\" NURSING  OUTPATIENT/OBSERVATION GOALS TO BE MET BEFORE DISCHARGE:  ADLs back to baseline: Yes    Activity and level of assistance: Up with standby assistance.    Pain status: Pain free.    Return to near baseline physical activity: No     Discharge Planner Nurse   Safe discharge environment identified: Yes  Barriers to discharge: Yes.        Entered by: Kathi Hutton RN 07/01/2024 4:10 AM     Please review provider order for any additional goals.   Nurse to notify provider when observation goals have been met and patient is ready for discharge.Goal Outcome Evaluation: Pt alert and oriented. Able to make his needs known. BS monitored 128. Denies pain, sob.  . Continue to monitor.                         "

## 2024-07-03 ENCOUNTER — PATIENT OUTREACH (OUTPATIENT)
Dept: CARE COORDINATION | Facility: CLINIC | Age: 74
End: 2024-07-03
Payer: COMMERCIAL

## 2024-07-03 NOTE — PROGRESS NOTES
[facility]:   [facility] Contact  [facility]/Voicemail     Clinical Data: Post-Discharge Outreach     Outreach attempted x 2.  Left message on patient's voicemail, providing [facility]'s central phone number of [phone] ([phone]) for questions/concerns and/or to schedule an appt with an [facility] provider, if they do not have a PCP.      Plan:  [facility] will do no further outreaches at this time.       Neivn Hughes  Community Health Worker  [facility], [facility]  [phone]      *Connected Care Resource Team does NOT follow patient ongoing. Referrals are identified based on internal discharge reports and the outreach is to ensure patient has an understanding of their discharge instructions.

## 2024-07-07 LAB
ATRIAL RATE - MUSE: 79 BPM
DIASTOLIC BLOOD PRESSURE - MUSE: 80 MMHG
INTERPRETATION ECG - MUSE: NORMAL
P AXIS - MUSE: 61 DEGREES
PR INTERVAL - MUSE: 166 MS
QRS DURATION - MUSE: 88 MS
QT - MUSE: 374 MS
QTC - MUSE: 428 MS
R AXIS - MUSE: 44 DEGREES
SYSTOLIC BLOOD PRESSURE - MUSE: 172 MMHG
T AXIS - MUSE: 36 DEGREES
VENTRICULAR RATE- MUSE: 79 BPM

## 2025-03-28 ENCOUNTER — APPOINTMENT (OUTPATIENT)
Dept: RADIOLOGY | Facility: HOSPITAL | Age: 75
End: 2025-03-28
Payer: COMMERCIAL

## 2025-03-28 ENCOUNTER — HOSPITAL ENCOUNTER (EMERGENCY)
Facility: HOSPITAL | Age: 75
Discharge: HOME | End: 2025-03-28
Attending: EMERGENCY MEDICINE | Admitting: EMERGENCY MEDICINE
Payer: COMMERCIAL

## 2025-03-28 VITALS
TEMPERATURE: 98.3 F | OXYGEN SATURATION: 97 % | RESPIRATION RATE: 24 BRPM | DIASTOLIC BLOOD PRESSURE: 83 MMHG | SYSTOLIC BLOOD PRESSURE: 153 MMHG | HEART RATE: 67 BPM

## 2025-03-28 DIAGNOSIS — R07.9 CHEST PAIN: ICD-10-CM

## 2025-03-28 LAB
ANION GAP SERPL CALCULATED.3IONS-SCNC: 4 MMOL/L (ref 7–15)
BASOPHILS # BLD AUTO: 0.1 10E3/UL (ref 0–0.2)
BASOPHILS NFR BLD AUTO: 1 %
BUN SERPL-MCNC: 17.8 MG/DL (ref 8–23)
CALCIUM SERPL-MCNC: 8.5 MG/DL (ref 8.8–10.4)
CHLORIDE SERPL-SCNC: 107 MMOL/L (ref 98–107)
CREAT SERPL-MCNC: 1.28 MG/DL (ref 0.67–1.17)
D DIMER PPP FEU-MCNC: 0.28 UG/ML FEU (ref 0–0.5)
EGFRCR SERPLBLD CKD-EPI 2021: 59 ML/MIN/1.73M2
EOSINOPHIL # BLD AUTO: 0.4 10E3/UL (ref 0–0.7)
EOSINOPHIL NFR BLD AUTO: 3 %
ERYTHROCYTE [DISTWIDTH] IN BLOOD BY AUTOMATED COUNT: 11.9 % (ref 10–15)
GLUCOSE SERPL-MCNC: 364 MG/DL (ref 70–99)
HCO3 SERPL-SCNC: 30 MMOL/L (ref 22–29)
HCT VFR BLD AUTO: 34.3 % (ref 40–53)
HGB BLD-MCNC: 11 G/DL (ref 13.3–17.7)
IMM GRANULOCYTES # BLD: 0.1 10E3/UL
IMM GRANULOCYTES NFR BLD: 1 %
LYMPHOCYTES # BLD AUTO: 1.5 10E3/UL (ref 0.8–5.3)
LYMPHOCYTES NFR BLD AUTO: 14 %
MCH RBC QN AUTO: 31.8 PG (ref 26.5–33)
MCHC RBC AUTO-ENTMCNC: 32.1 G/DL (ref 31.5–36.5)
MCV RBC AUTO: 99 FL (ref 78–100)
MONOCYTES # BLD AUTO: 0.7 10E3/UL (ref 0–1.3)
MONOCYTES NFR BLD AUTO: 6 %
NEUTROPHILS # BLD AUTO: 7.9 10E3/UL (ref 1.6–8.3)
NEUTROPHILS NFR BLD AUTO: 75 %
NRBC # BLD AUTO: 0 10E3/UL
NRBC BLD AUTO-RTO: 0 /100
PLATELET # BLD AUTO: 246 10E3/UL (ref 150–450)
POTASSIUM SERPL-SCNC: 5.1 MMOL/L (ref 3.4–5.3)
RBC # BLD AUTO: 3.46 10E6/UL (ref 4.4–5.9)
SODIUM SERPL-SCNC: 141 MMOL/L (ref 135–145)
TROPONIN T SERPL HS-MCNC: 28 NG/L
TROPONIN T SERPL HS-MCNC: 29 NG/L
WBC # BLD AUTO: 10.6 10E3/UL (ref 4–11)

## 2025-03-28 PROCEDURE — 258N000003 HC RX IP 258 OP 636

## 2025-03-28 PROCEDURE — 80048 BASIC METABOLIC PNL TOTAL CA: CPT

## 2025-03-28 PROCEDURE — 250N000013 HC RX MED GY IP 250 OP 250 PS 637

## 2025-03-28 PROCEDURE — 93005 ELECTROCARDIOGRAM TRACING: CPT | Performed by: EMERGENCY MEDICINE

## 2025-03-28 PROCEDURE — 71046 X-RAY EXAM CHEST 2 VIEWS: CPT

## 2025-03-28 PROCEDURE — 93005 ELECTROCARDIOGRAM TRACING: CPT | Performed by: STUDENT IN AN ORGANIZED HEALTH CARE EDUCATION/TRAINING PROGRAM

## 2025-03-28 PROCEDURE — 96361 HYDRATE IV INFUSION ADD-ON: CPT

## 2025-03-28 PROCEDURE — 99285 EMERGENCY DEPT VISIT HI MDM: CPT | Mod: 25

## 2025-03-28 PROCEDURE — 82565 ASSAY OF CREATININE: CPT

## 2025-03-28 PROCEDURE — 96360 HYDRATION IV INFUSION INIT: CPT | Mod: 59

## 2025-03-28 PROCEDURE — 36415 COLL VENOUS BLD VENIPUNCTURE: CPT

## 2025-03-28 PROCEDURE — 85004 AUTOMATED DIFF WBC COUNT: CPT

## 2025-03-28 PROCEDURE — 93005 ELECTROCARDIOGRAM TRACING: CPT

## 2025-03-28 PROCEDURE — 80051 ELECTROLYTE PANEL: CPT

## 2025-03-28 PROCEDURE — 85379 FIBRIN DEGRADATION QUANT: CPT

## 2025-03-28 PROCEDURE — 85014 HEMATOCRIT: CPT

## 2025-03-28 PROCEDURE — 84484 ASSAY OF TROPONIN QUANT: CPT

## 2025-03-28 RX ORDER — ASPIRIN 325 MG
325 TABLET ORAL ONCE
Status: DISCONTINUED | OUTPATIENT
Start: 2025-03-28 | End: 2025-03-28

## 2025-03-28 RX ORDER — ACETAMINOPHEN 325 MG/1
650 TABLET ORAL ONCE
Status: COMPLETED | OUTPATIENT
Start: 2025-03-28 | End: 2025-03-28

## 2025-03-28 RX ADMIN — SODIUM CHLORIDE 1000 ML: 0.9 INJECTION, SOLUTION INTRAVENOUS at 12:48

## 2025-03-28 RX ADMIN — ACETAMINOPHEN 650 MG: 325 TABLET ORAL at 12:49

## 2025-03-28 ASSESSMENT — ACTIVITIES OF DAILY LIVING (ADL)
ADLS_ACUITY_SCORE: 59

## 2025-03-28 NOTE — ED TRIAGE NOTES
Pt brought in by EMS from home.   Pt experienced a 3-5 second anterior left sided CP episode at about 10am. Pain has resolved and has not had another episode. EMS 12 lead showed NSR. Hx of htn and type 2 diabetes. BG en route was 434. 500ml NS bolus initiated by EMS.   Pt takes metformin and 35 units of lantus every morning. He has not taken those today.     Triage Assessment (Adult)       Row Name 03/28/25 1057          Triage Assessment    Airway WDL WDL        Respiratory WDL    Respiratory WDL WDL        Skin Circulation/Temperature WDL    Skin Circulation/Temperature WDL WDL        Cardiac WDL    Cardiac WDL WDL        Peripheral/Neurovascular WDL    Peripheral Neurovascular WDL WDL        Cognitive/Neuro/Behavioral WDL    Cognitive/Neuro/Behavioral WDL WDL

## 2025-03-28 NOTE — DISCHARGE INSTRUCTIONS
AT THIS TIME, WE DID NOT FIND A LIFE THREATENING CAUSE FOR YOUR CHEST PAIN.      Because we do not know yet what is the cause of your symptoms, please follow up in cardiology clinic within the next 2-3 days for repeat exam and consideration of additional testing.   Please call to schedule that follow up appointment as soon as you can.     Because we do not know yet what is the cause of your symptoms, we have sent a referral to CARDIOLOGY for you to have a follow up appointment within the next 2 to 3 days for repeat exam and consideration of additional testing.       PLEASE RETURN TO THE EMERGENCY DEPARTMENT FOR FURTHER EVALUATION IF YOU DEVELOP:  Recurrent OR worsening chest pain, trouble breathing, abdominal pain, fever or any other concerning problems.

## 2025-03-28 NOTE — ED PROVIDER NOTES
Emergency Department Encounter   NAME: Montez Moser  AGE: 74 year old male  YOB: 1950  MRN: [id number]    PCP: Lori Cerrato  ED PROVIDER: Homa Merida PA-C    Evaluation Date & Time:   3/28/2025 10:50 AM    CHIEF COMPLAINT:  Chest Pain      Impression and Plan   MDM: 74-year-old male with history of type 2 diabetes, hypertension, and hyperlipidemia presents for evaluation of chest pain.  On arrival here patient is hypertensive at 185/84, but otherwise vitally stable.  Afebrile.  On exam patient is in no acute distress.  He has no reproducible chest wall tenderness.  Unremarkable cardiac and pulmonary exams.  No peripheral edema.  Differential includes ACS, pneumonia, pneumothorax, pleural effusion, PE.  Less consistent with acute aortic syndrome.  No abdominal tenderness to suggest intra-abdominal etiology of his pain today.  Discussed plans for labs and imaging here which patient is agreeable to.  Tylenol for discomfort at this time.    EKG with new nonspecific T wave abnormality in the lateral leads.  No evidence of STEMI.  Lab work here without any leukocytosis.  Stable chronic anemia.  Stable elevated creatinine.  Hyperglycemia with blood glucose 364, but no anion gap.  Will give him some fluids.  He has not taken his insulin yet this morning.  Initial troponin elevated at 29, will recheck in 2 hours.  D-dimer is negative, PE is unlikely.  Will obtain chest x-ray.    Repeat EKG without change.  Repeat troponin stable at 28- ACS is unlikely.  Chest x-ray per my independent interpretation without pneumothorax, pleural effusion, consolidation concerning for pneumonia.  No acute findings on chest x-ray per radiology read, see full report below.    I reassessed the patient.  He continues to rest comfortably.  Has not had any recurrence of his pain since he has been here.  Blood sugar has improved since measured at 434 by EMS.  Discussed overall unclear etiology of his pain at this time.   Reassuring workup against acute life-threatening process.  Discussed the importance of close follow-up with cardiology.  Rapid access referral was placed.  I discussed having low threshold to return back to the emergency department if any thing worsens or changes which patient and his wife expressed understanding of.  They have no questions and both feel comfortable with close outpatient management.  We reviewed strict return precautions and patient was discharged home in stable condition.      I have staffed the patient with Dr. Michael, ED physician, who will evaluate the patient and agrees with all aspects of today's care.          Medical Decision Making  Care impacted by Chronic Kidney Disease, Hyperlipidemia, and Hypertension  Discharge. No recommendations on prescription strength medication(s). N/A.    MIPS (CTPE, Dental pain, Castillo, Sinusitis, Asthma/COPD, Head Trauma): Not Applicable    SEPSIS: None          FINAL IMPRESSION:    ICD-10-CM    1. Chest pain  R07.9 Rapid Access Clinic  Referral            MEDICATIONS GIVEN IN THE EMERGENCY DEPARTMENT:  Medications   acetaminophen (TYLENOL) tablet 650 mg (650 mg Oral $Given 3/28/25 1249)   sodium chloride 0.9% BOLUS 1,000 mL (0 mLs Intravenous Stopped 3/28/25 7277)         NEW PRESCRIPTIONS STARTED AT TODAY'S ED VISIT:  New Prescriptions    No medications on file         HPI   Patient information was obtained from: patient   Use of Intrepreter: N/A     Montez Moser is a 74 year old male with a pertinent history of type 2 diabetes, hypertension, hyperlipidemia, anxiety, bipolar 1 disorder who presents to the ED by EMS for evaluation of chest pain.  This morning at about 10 AM patient had 3 to 5 seconds of severe left-sided chest pain.  Reports that this has mostly gone away but now it is at about a 1 out of 10.  It is pleuritic in nature.  He has never experienced anything like this before.  Denies any shortness of breath, cough, fever.  Is on Plavix  but no other blood thinning medications.  No history of heart or lung problems.  No history of PE or DVT.  Has never seen a cardiologist.      REVIEW OF SYSTEMS:  Pertinent positive and negative symptoms per HPI.       Physical Exam     First Vitals:  Patient Vitals for the past 24 hrs:   BP Temp Temp src Pulse Resp SpO2   03/28/25 1129 -- 98.3  F (36.8  C) Oral -- -- --   03/28/25 1115 (!) 153/83 -- -- 67 24 97 %   03/28/25 1100 (!) 168/71 -- -- 67 20 95 %   03/28/25 1056 -- -- -- -- 18 --   03/28/25 1054 -- -- -- 69 -- 94 %   03/28/25 1052 (!) 185/84 -- -- -- -- --       PHYSICAL EXAM:   General Appearance:  Alert, cooperative, no distress, appears stated age  HENT: Normocephalic without obvious deformity, atraumatic. Mucous membranes moist   Eyes: Conjunctiva clear, Lids normal. No discharge.   Respiratory: No distress. Lungs clear to ausculation bilaterally. No wheezes, rhonchi or stridor  Cardiovascular: Regular rate and rhythm, no murmur. Normal cap refill. No peripheral edema  GI: Abdomen soft, nontender, normal bowel sounds  : No CVA tenderness  Musculoskeletal: Moving all extremities. No gross deformities  Integument: Warm, dry, no rashes or lesions  Neurologic: Alert and orientated x3.   Psych: Normal mood and affect      Results     LAB:  All pertinent labs reviewed and interpreted  Labs Ordered and Resulted from Time of ED Arrival to Time of ED Departure   BASIC METABOLIC PANEL - Abnormal       Result Value    Sodium 141      Potassium 5.1      Chloride 107      Carbon Dioxide (CO2) 30 (*)     Anion Gap 4 (*)     Urea Nitrogen 17.8      Creatinine 1.28 (*)     GFR Estimate 59 (*)     Calcium 8.5 (*)     Glucose 364 (*)    TROPONIN T, HIGH SENSITIVITY - Abnormal    Troponin T, High Sensitivity 29 (*)    CBC WITH PLATELETS AND DIFFERENTIAL - Abnormal    WBC Count 10.6      RBC Count 3.46 (*)     Hemoglobin 11.0 (*)     Hematocrit 34.3 (*)     MCV 99      MCH 31.8      MCHC 32.1      RDW 11.9       Platelet Count 246      % Neutrophils 75      % Lymphocytes 14      % Monocytes 6      % Eosinophils 3      % Basophils 1      % Immature Granulocytes 1      NRBCs per 100 WBC 0      Absolute Neutrophils 7.9      Absolute Lymphocytes 1.5      Absolute Monocytes 0.7      Absolute Eosinophils 0.4      Absolute Basophils 0.1      Absolute Immature Granulocytes 0.1      Absolute NRBCs 0.0     TROPONIN T, HIGH SENSITIVITY - Abnormal    Troponin T, High Sensitivity 28 (*)    D DIMER QUANTITATIVE - Normal    D-Dimer Quantitative 0.28         RADIOLOGY:  Chest XR,  PA & LAT   Final Result   IMPRESSION:       Unchanged elevation of the central left hemidiaphragm with normal position of the anterior and posterior costal phrenic sulci consistent with left hemidiaphragm eventration. No pleural effusions.      No alveolar or interstitial lung opacities. Unchanged benign calcified granuloma right midlung. No new lung nodules. Normal lung vascularity.      Cardiac silhouette is not enlarged. Mediastinal borders are well-defined.      Small, flowing degenerative osteophytes are present through the mid and lower thoracic spine. No vertebral compression deformities. No displaced rib fractures are detected.          ECG:    Performed at: 1057    Impression: sinus rhythm    Rate: 66  Rhythm: sinus  OK Interval: 178  QRS Interval: 82  QTc Interval: 389  ST Changes: none  Comparison: non-specific T wave abnormality in lateral leads when compared to 6/28/24    EKG results reviewed and interpreted by Dr. Alec ED MD.     ECG:    Performed at: 1220    Impression: sinus rhythm    Rate: 65  Rhythm: sinus  OK Interval: 162  QRS Interval: 92  QTc Interval: 416  ST Changes: none  Comparison: no significant change from 3/28/25    EKG results reviewed and interpreted by Dr. Alec ED MD.       Homa Merida PA-C   Emergency Medicine   [facility] EMERGENCY DEPARTMENT       Homa Merida PA-C  03/28/25 3659

## 2025-03-28 NOTE — ED NOTES
Bed: [facility]  Expected date:   Expected time:   Means of arrival: Ambulance  Comments:  [place]: 74m chest pain, resolved now, on thinners, NSR, vss

## 2025-03-28 NOTE — ED PROVIDER NOTES
Emergency Department Midlevel Supervisory Note     I had a face to face encounter with this patient seen by the Advanced Practice Provider (ANEESH). I personally made/approved the management plan and take responsibility for the patient management. I personally saw patient and performed a substantive portion of the visit including all aspects of the medical decision making.     ED Course:  11:49 AM Homa Merida PA-C staffed patient with me. I agree with their assessment and plan of management, and I will see the patient.   I met with the patient to introduce myself, gather additional history, perform my initial exam, and discuss the plan.   2:13 PM It was decided that the patient is ready for discharge.    Brief HPI:     Montez Moser is a 74 year old male who presents for evaluation of chest pain. At 10:00 AM this morning (03/28/2025) he had 3 to 5 seconds of severe left sided chest pain. The pain is still present but is now at a 1/10. He has never had pain like this before.    I, Martínez Chau, am serving as a scribe to document services personally performed by Josh Michael MD, based on my observations and the provider's statements to me.   I, Josh Michael MD attest that Martínez Chau was acting in a scribe capacity, has observed my performance of the services and has documented them in accordance with my direction.    Brief Physical Exam: BP (!) 153/83   Pulse 67   Temp 98.3  F (36.8  C) (Oral)   Resp 24   SpO2 97%   Constitutional:  Alert, in no acute distress  EYES: Conjunctivae clear  HENT:  Atraumatic  Respiratory:  Respirations even, unlabored, in no acute respiratory distress  Cardiovascular:  good peripheral perfusion  GI: Soft, non-distended,  Musculoskeletal:  Moves all 4 extremities equally, grossly symmetrical strength  Integument: Warm & dry. No appreciable rash, erythema.       MDM:  This patient is a 74-year-old male who presents with chest pain that began this morning and last for 3 to 5  seconds.  In the ER his pain was rated a 1 out of 10.  He does have a history of type 2 diabetes hypertension hyperlipidemia.  The initial EKG had a poor baseline and so this was repeated and did not show any ST elevation.  I independently reviewed and interpreted the EKG.  A troponin was done and repeated in 2 hours and he is results were 29 and 28 which looks like where he normally is that based on previous troponins.   He is going to be referred to the cardiology rapid access clinic for further workup of the chest pain.    1. Chest pain            Labs and Imaging:  Results for orders placed or performed during the hospital encounter of 03/28/25   Chest XR,  PA & LAT    Impression    IMPRESSION:     Unchanged elevation of the central left hemidiaphragm with normal position of the anterior and posterior costal phrenic sulci consistent with left hemidiaphragm eventration. No pleural effusions.    No alveolar or interstitial lung opacities. Unchanged benign calcified granuloma right midlung. No new lung nodules. Normal lung vascularity.    Cardiac silhouette is not enlarged. Mediastinal borders are well-defined.    Small, flowing degenerative osteophytes are present through the mid and lower thoracic spine. No vertebral compression deformities. No displaced rib fractures are detected.   Basic metabolic panel   Result Value Ref Range    Sodium 141 135 - 145 mmol/L    Potassium 5.1 3.4 - 5.3 mmol/L    Chloride 107 98 - 107 mmol/L    Carbon Dioxide (CO2) 30 (H) 22 - 29 mmol/L    Anion Gap 4 (L) 7 - 15 mmol/L    Urea Nitrogen 17.8 8.0 - 23.0 mg/dL    Creatinine 1.28 (H) 0.67 - 1.17 mg/dL    GFR Estimate 59 (L) >60 mL/min/1.73m2    Calcium 8.5 (L) 8.8 - 10.4 mg/dL    Glucose 364 (H) 70 - 99 mg/dL   Result Value Ref Range    Troponin T, High Sensitivity 29 (H) <=22 ng/L   CBC with platelets and differential   Result Value Ref Range    WBC Count 10.6 4.0 - 11.0 10e3/uL    RBC Count 3.46 (L) 4.40 - 5.90 10e6/uL     Hemoglobin 11.0 (L) 13.3 - 17.7 g/dL    Hematocrit 34.3 (L) 40.0 - 53.0 %    MCV 99 78 - 100 fL    MCH 31.8 26.5 - 33.0 pg    MCHC 32.1 31.5 - 36.5 g/dL    RDW 11.9 10.0 - 15.0 %    Platelet Count 246 150 - 450 10e3/uL    % Neutrophils 75 %    % Lymphocytes 14 %    % Monocytes 6 %    % Eosinophils 3 %    % Basophils 1 %    % Immature Granulocytes 1 %    NRBCs per 100 WBC 0 <1 /100    Absolute Neutrophils 7.9 1.6 - 8.3 10e3/uL    Absolute Lymphocytes 1.5 0.8 - 5.3 10e3/uL    Absolute Monocytes 0.7 0.0 - 1.3 10e3/uL    Absolute Eosinophils 0.4 0.0 - 0.7 10e3/uL    Absolute Basophils 0.1 0.0 - 0.2 10e3/uL    Absolute Immature Granulocytes 0.1 <=0.4 10e3/uL    Absolute NRBCs 0.0 10e3/uL   D dimer quantitative   Result Value Ref Range    D-Dimer Quantitative 0.28 0.00 - 0.50 ug/mL FEU   Result Value Ref Range    Troponin T, High Sensitivity 28 (H) <=22 ng/L       I have reviewed the relevant laboratory studies above.    I independently interpreted the following imaging study(s):   Chest x-ray    EKG: I reviewed and independently interpreted the patient's EKG, with comments made as listed below. Please see scanned EKG for full report.     EKG #1:    Performed at: 03/28/2025 at 10:57 AM    Impression: Sinus rhythm, nonspecific ST and T wave abnormality, abnormal ECG    Rate: 66 BPM  NV interval: 178 ms  QRS duration: 82 ms  QT/QTcB 372/389 ms  BP: 185/84 mmHg    Comparison: When compared to ECG from 06/28/2024 at 2:43 PM nonspecific T wave abnormality now evident in Lateral leads    EKG #2:    Performed at: 03/28/2025 at 12:20 PM    Impression: Sinus rhythm, nonspecific ST and T wave abnormality, abnormal ECG    Rate: 65 BPM  NV interval: 162 ms  QRS duration: 92 ms  QT/QTcB: 400/416 ms  P-R-T axes: 66, 66, 63  BP: 169/81 mmHg    Comparison: When compared with ECG from 03/28/2025 at 10:57 AM nonspecific T wave abnormality no longer evident in Lateral leads    Procedures:  I was present for the key portions of procedures  documented in ANEESH/midlevel note, see midlevel note for further details.    Josh Michael MD  [facility] EMERGENCY DEPARTMENT  [facility] [facility] [id number] [phone]       Josh Michael MD  03/28/25 3265

## 2025-03-30 LAB
ATRIAL RATE - MUSE: 65 BPM
ATRIAL RATE - MUSE: 66 BPM
DIASTOLIC BLOOD PRESSURE - MUSE: 81 MMHG
DIASTOLIC BLOOD PRESSURE - MUSE: 84 MMHG
INTERPRETATION ECG - MUSE: NORMAL
INTERPRETATION ECG - MUSE: NORMAL
P AXIS - MUSE: 42 DEGREES
P AXIS - MUSE: 66 DEGREES
PR INTERVAL - MUSE: 162 MS
PR INTERVAL - MUSE: 178 MS
QRS DURATION - MUSE: 82 MS
QRS DURATION - MUSE: 92 MS
QT - MUSE: 372 MS
QT - MUSE: 400 MS
QTC - MUSE: 389 MS
QTC - MUSE: 416 MS
R AXIS - MUSE: 60 DEGREES
R AXIS - MUSE: 66 DEGREES
SYSTOLIC BLOOD PRESSURE - MUSE: 169 MMHG
SYSTOLIC BLOOD PRESSURE - MUSE: 185 MMHG
T AXIS - MUSE: 46 DEGREES
T AXIS - MUSE: 63 DEGREES
VENTRICULAR RATE- MUSE: 65 BPM
VENTRICULAR RATE- MUSE: 66 BPM

## 2025-04-01 NOTE — PROGRESS NOTES
HEART CARE OUT-PATIENT CONSULTATON NOTE      [facility]  [phone]      Assessment/Recommendations   Assessment: 74 year old male with chest pain     Plan:  Chest pain, atypical  Atypical symptoms but he does have significant CV risk factors (tobacco, diabetes, HTN, hyperlipidemia, family history) so still consider ischemia       -Stress test, if abnormal will need angiogram, if negative can follow up with PCP for noncardiac causes     HTN  A bit high, taken off medications recently for unclear reasons (? labile BP)       -Add Lisinopril 2.5mg due to diabetes      -Follow-up with PCP     Hyperlipidemia  LDL = 52      -Consider increase Lipitor to 20mg due to diabetes     The longitudinal plan of care for the diagnosis(es)/condition(s) as documented were addressed during this visit. Due to the added complexity in care, I will continue to support Montez in the subsequent management and with ongoing continuity of care.          History of Present Illness/Subjective    Indication for Consult:  I was asked to see Montez Moser by Homa Carter  for chest pain       HPI: Montez Moser is a 74 year old male with a history of diabetes, HTN, hyperlipidemia, bipolar, dementia who presents for evaluation of chest pain.  He was seen in the ER 3/28/2025 with negative ECG and hs troponin.  He was advised to see cardiology.    He reports chest pain often in his 30-40s, no diagnosis, eventually resolved.  Now recurrent and similar to prior.  Chest pain is central/left sternal, stabbing and sharp, lasts a few minutes and resolves.  No specific trigger, happens at rest.  Can go days without chest pain, then returns at random.  Denies dyspnea, orthopnea, PND.  On Plavix for TIAs.         I reviewed notes from ER, PCP prior to this visit.         Physical Examination  Past Cardiac History   Vitals: BP (!) 146/70 (BP Location: Left arm, Patient Position: Sitting, Cuff Size: Adult Large)   Pulse 72   Resp  "20   Ht 1.626 m (5' 4\")   Wt 81.5 kg (179 lb 9.6 oz)   BMI 30.83 kg/m    BMI= Body mass index is 30.83 kg/m .  Wt Readings from Last 3 Encounters:   04/03/25 81.5 kg (179 lb 9.6 oz)   06/29/24 79.2 kg (174 lb 9.7 oz)   01/04/23 88.5 kg (195 lb)       General Appearance:   no distress, normal body habitus   ENT/Mouth: membranes moist, no oral lesions or bleeding gums.      EYES:  no scleral icterus, normal conjunctivae   Neck: no carotid bruits or thyromegaly   Chest/Lungs:   lungs with scattered expiratory wheezes    Cardiovascular:   Regular. Normal first and second heart sounds with no murmurs, rubs, or gallops; the carotid, radial and posterior tibial pulses are intact, Jugular venous pressure normal , no edema bilaterally    Abdomen:  no organomegaly, masses, bruits, or tenderness; bowel sounds are present   Extremities: no cyanosis or clubbing   Skin: no xanthelasma, warm.    Neurologic: normal  bilateral, no tremors           None    Most Recent Echocardiogram: 6/29/2024  1. The left ventricle is normal in size. Left ventricular function is  normal.The ejection fraction is 60-65%. No regional wall motion abnormalities  noted.  2. Normal right ventricle size and systolic function.  3. No hemodynamically significant valvular abnormalities on 2D or color flow  imaging.  4. Compared to the prior study dated 12/31/22, there have been no changes.    Most Recent Stress Test: None    Most Recent Angiogram: None    ECG (reviewed by myself): 3/28/2025 NSR 65 bpm           Medical History  Family History Social History   Past Medical History:   Diagnosis Date    Anxiety 04/24/2020    Asthma exacerbation 09/11/2017    Bipolar I disorder (H) 04/24/2020    Controlled type 2 diabetes mellitus, without long-term current use of insulin (H) 09/11/2017    Mixed hyperlipidemia     Recurrent major depressive disorder 09/11/2017    Respiratory insufficiency 09/11/2017     Unknown      Social History     Socioeconomic History "    Marital status:      Spouse name: Not on file    Number of children: Not on file    Years of education: Not on file    Highest education level: Not on file   Occupational History    Not on file   Tobacco Use    Smoking status: Former     Current packs/day: 0.00     Types: Cigarettes     Quit date: 1977     Years since quittin.5    Smokeless tobacco: Never   Substance and Sexual Activity    Alcohol use: No     Comment: Alcoholic Drinks/day: quit years ago    Drug use: No    Sexual activity: Not on file     Comment: not asked   Other Topics Concern    Not on file   Social History Narrative    retired     Social Drivers of Health     Financial Resource Strain: Low Risk  (2025)    Received from [facility]    Financial Resource Strain     Difficulty of Paying Living Expenses: 3     Difficulty of Paying Living Expenses: Not on file   Food Insecurity: No Food Insecurity (2025)    Received from [facility]    Food Insecurity     Do you worry your food will run out before you are able to buy more?: 1   Transportation Needs: Unmet Transportation Needs (2025)    Received from [facility]    Transportation Needs     Does lack of transportation keep you from medical appointments?: 1     Does lack of transportation keep you from work, meetings or getting things that you need?: 2   Physical Activity: Not on file   Stress: Not on file   Social Connections: Socially Isolated (2025)    Received from [facility]    Social Connections     Do you often feel lonely or isolated from those around you?: 4   Interpersonal Safety: Not on file   Housing Stability: Low Risk  (2025)    Received from [facility]    Housing Stability     What is your housing situation today?: 1           Medications  Allergies   Current Outpatient Medications    Medication Sig Dispense Refill    albuterol (PROAIR HFA/PROVENTIL HFA/VENTOLIN HFA) 108 (90 Base) MCG/ACT inhaler Inhale 2 puffs into the lungs 2 times daily as needed for shortness of breath / dyspnea or wheezing      amLODIPine (NORVASC) 5 MG tablet Take 1 tablet (5 mg) by mouth daily Hold for SBP < 100 or DBP < 60 60 tablet 1    ARIPiprazole (ABILIFY) 20 MG tablet Take 10 mg by mouth every evening.      atorvastatin (LIPITOR) 10 MG tablet Take 10 mg by mouth every evening      brimonidine-timolol (COMBIGAN) 0.2-0.5 % ophthalmic solution Place 1 drop into both eyes 2 times daily      clopidogrel (PLAVIX) 75 MG tablet Take 1 tablet (75 mg) by mouth daily for 90 days      Continuous Glucose Sensor (FREESTYLE SHASHI 3 PLUS SENSOR) MISC       donepezil (ARICEPT) 10 MG tablet Take 10 mg by mouth daily      escitalopram (LEXAPRO) 20 MG tablet Take 20 mg by mouth every evening      gabapentin (NEURONTIN) 400 MG capsule Take 1,200 mg by mouth 2 times daily      insulin glargine (LANTUS PEN) 100 UNIT/ML pen Inject 38 Units subcutaneously every morning.      lamoTRIgine (LAMICTAL) 100 MG tablet Take 300 mg by mouth every evening      metFORMIN (GLUCOPHAGE-XR) 500 MG 24 hr tablet Take 500 mg by mouth 2 times daily (with meals)      montelukast (SINGULAIR) 10 MG tablet Take 10 mg by mouth At Bedtime      OLANZapine (ZYPREXA) 10 MG tablet Take 5 mg by mouth at bedtime. Patient is taking 2.5 tablets      semaglutide (OZEMPIC, 0.25 OR 0.5 MG/DOSE,) 2 MG/1.5ML SOPN pen Inject 2 mg Subcutaneous every 7 days      venlafaxine (EFFEXOR-XR) 75 MG 24 hr capsule Take 225 mg by mouth daily      aspirin (ASA) 325 MG EC tablet Take 1 tablet (325 mg) by mouth daily (Patient not taking: Reported on 4/3/2025) 30 tablet 1    metoprolol succinate ER (TOPROL XL) 50 MG 24 hr tablet Take 1 tablet (50 mg) by mouth daily Hold for SBP < 100 or DBP < 60 or HR < 50 (Patient not taking: Reported on 4/3/2025) 60 tablet 1    traZODone (DESYREL) 150 MG  tablet Take 300 mg by mouth At Bedtime (Patient not taking: Reported on 4/3/2025)         Allergies   Allergen Reactions    Bupropion Diarrhea    Erythromycin Diarrhea    Theophylline GI Disturbance          Lab Results    Chemistry/lipid CBC Cardiac Enzymes/BNP/TSH/INR   Recent Labs   Lab Test 06/28/24  1428   CHOL 102   HDL 32*   LDL 52   TRIG 88     Recent Labs   Lab Test 06/28/24  1428 12/30/22  1935 12/16/21  1127   LDL 52 72 70     Recent Labs   Lab Test 03/28/25  1122      POTASSIUM 5.1   CHLORIDE 107   CO2 30*   *   BUN 17.8   CR 1.28*   GFRESTIMATED 59*   JAYME 8.5*     Recent Labs   Lab Test 03/28/25  1122 06/29/24  0731 06/28/24  1428   CR 1.28* 1.05 1.27*     Recent Labs   Lab Test 06/28/24  1428 12/30/22  1656 03/23/21  0000   A1C 7.5* 7.2* 7.8*          Recent Labs   Lab Test 03/28/25  1122   WBC 10.6   HGB 11.0*   HCT 34.3*   MCV 99        Recent Labs   Lab Test 03/28/25  1122 06/29/24  0731 06/28/24  1428   HGB 11.0* 11.6* 10.3*    Recent Labs   Lab Test 04/23/20  1545   TROPONINI <0.01     Recent Labs   Lab Test 04/23/20  1545   BNP <10     Recent Labs   Lab Test 12/30/22  1656   TSH 3.16     Recent Labs   Lab Test 06/28/24  1428 01/04/23  1650 12/30/22  1935   INR 1.10 1.08 0.93        Viky Carrillo MD  Noninvasive Cardiologist   [facility]

## 2025-04-03 ENCOUNTER — OFFICE VISIT (OUTPATIENT)
Dept: CARDIOLOGY | Facility: CLINIC | Age: 75
End: 2025-04-03
Payer: COMMERCIAL

## 2025-04-03 VITALS
BODY MASS INDEX: 30.66 KG/M2 | RESPIRATION RATE: 20 BRPM | DIASTOLIC BLOOD PRESSURE: 70 MMHG | HEIGHT: 64 IN | HEART RATE: 72 BPM | WEIGHT: 179.6 LBS | SYSTOLIC BLOOD PRESSURE: 146 MMHG

## 2025-04-03 DIAGNOSIS — I10 PRIMARY HYPERTENSION: ICD-10-CM

## 2025-04-03 DIAGNOSIS — E78.5 HYPERLIPIDEMIA LDL GOAL <70: ICD-10-CM

## 2025-04-03 DIAGNOSIS — R07.89 CHEST PAIN, ATYPICAL: Primary | ICD-10-CM

## 2025-04-03 RX ORDER — HYDROCHLOROTHIAZIDE 12.5 MG/1
CAPSULE ORAL
COMMUNITY
Start: 2025-02-05

## 2025-04-03 RX ORDER — LISINOPRIL 2.5 MG/1
2.5 TABLET ORAL DAILY
Qty: 30 TABLET | Refills: 11 | Status: SHIPPED | OUTPATIENT
Start: 2025-04-03

## 2025-04-03 NOTE — LETTER
4/3/2025    Lori Cerrato MD  [address]  [facility] [address]    RE: Montez Moser       Dear Colleague,     I had the pleasure of seeing Montez Moser in the [facility].    HEART CARE OUT-PATIENT CONSULTATON NOTE      [facility]  [phone]      Assessment/Recommendations   Assessment: 74 year old male with chest pain     Plan:  Chest pain, atypical  Atypical symptoms but he does have significant CV risk factors (tobacco, diabetes, HTN, hyperlipidemia, family history) so still consider ischemia       -Stress test, if abnormal will need angiogram, if negative can follow up with PCP for noncardiac causes     HTN  A bit high, taken off medications recently for unclear reasons (? labile BP)       -Add Lisinopril 2.5mg due to diabetes      -Follow-up with PCP     Hyperlipidemia  LDL = 52      -Consider increase Lipitor to 20mg due to diabetes     The longitudinal plan of care for the diagnosis(es)/condition(s) as documented were addressed during this visit. Due to the added complexity in care, I will continue to support Montez in the subsequent management and with ongoing continuity of care.          History of Present Illness/Subjective    Indication for Consult:  I was asked to see Montez Moser by Homa Carter  for chest pain       HPI: Montez Moser is a 74 year old male with a history of diabetes, HTN, hyperlipidemia, bipolar, dementia who presents for evaluation of chest pain.  He was seen in the ER 3/28/2025 with negative ECG and hs troponin.  He was advised to see cardiology.    He reports chest pain often in his 30-40s, no diagnosis, eventually resolved.  Now recurrent and similar to prior.  Chest pain is central/left sternal, stabbing and sharp, lasts a few minutes and resolves.  No specific trigger, happens at rest.  Can go days without chest pain, then returns at random.  Denies dyspnea, orthopnea, PND.  On Plavix for TIAs.         I  "reviewed notes from ER, PCP prior to this visit.         Physical Examination  Past Cardiac History   Vitals: BP (!) 146/70 (BP Location: Left arm, Patient Position: Sitting, Cuff Size: Adult Large)   Pulse 72   Resp 20   Ht 1.626 m (5' 4\")   Wt 81.5 kg (179 lb 9.6 oz)   BMI 30.83 kg/m    BMI= Body mass index is 30.83 kg/m .  Wt Readings from Last 3 Encounters:   04/03/25 81.5 kg (179 lb 9.6 oz)   06/29/24 79.2 kg (174 lb 9.7 oz)   01/04/23 88.5 kg (195 lb)       General Appearance:   no distress, normal body habitus   ENT/Mouth: membranes moist, no oral lesions or bleeding gums.      EYES:  no scleral icterus, normal conjunctivae   Neck: no carotid bruits or thyromegaly   Chest/Lungs:   lungs with scattered expiratory wheezes    Cardiovascular:   Regular. Normal first and second heart sounds with no murmurs, rubs, or gallops; the carotid, radial and posterior tibial pulses are intact, Jugular venous pressure normal , no edema bilaterally    Abdomen:  no organomegaly, masses, bruits, or tenderness; bowel sounds are present   Extremities: no cyanosis or clubbing   Skin: no xanthelasma, warm.    Neurologic: normal  bilateral, no tremors           None    Most Recent Echocardiogram: 6/29/2024  1. The left ventricle is normal in size. Left ventricular function is  normal.The ejection fraction is 60-65%. No regional wall motion abnormalities  noted.  2. Normal right ventricle size and systolic function.  3. No hemodynamically significant valvular abnormalities on 2D or color flow  imaging.  4. Compared to the prior study dated 12/31/22, there have been no changes.    Most Recent Stress Test: None    Most Recent Angiogram: None    ECG (reviewed by myself): 3/28/2025 NSR 65 bpm           Medical History  Family History Social History   Past Medical History:   Diagnosis Date     Anxiety 04/24/2020     Asthma exacerbation 09/11/2017     Bipolar I disorder (H) 04/24/2020     Controlled type 2 diabetes mellitus, " without long-term current use of insulin (H) 2017     Mixed hyperlipidemia      Recurrent major depressive disorder 2017     Respiratory insufficiency 2017     Unknown      Social History     Socioeconomic History     Marital status:      Spouse name: Not on file     Number of children: Not on file     Years of education: Not on file     Highest education level: Not on file   Occupational History     Not on file   Tobacco Use     Smoking status: Former     Current packs/day: 0.00     Types: Cigarettes     Quit date: 1977     Years since quittin.5     Smokeless tobacco: Never   Substance and Sexual Activity     Alcohol use: No     Comment: Alcoholic Drinks/day: quit years ago     Drug use: No     Sexual activity: Not on file     Comment: not asked   Other Topics Concern     Not on file   Social History Narrative    retired     Social Drivers of Health     Financial Resource Strain: Low Risk  (2025)    [facility] from [facility]    Financial Resource Strain      Difficulty of Paying Living Expenses: 3      Difficulty of Paying Living Expenses: Not on file   Food Insecurity: No Food Insecurity (2025)    Received from [facility]    Food Insecurity      Do you worry your food will run out before you are able to buy more?: 1   Transportation Needs: Unmet Transportation Needs (2025)    Received from [facility]    Transportation Needs      Does lack of transportation keep you from medical appointments?: 1      Does lack of transportation keep you from work, meetings or getting things that you need?: 2   Physical Activity: Not on file   Stress: Not on file   Social Connections: Socially Isolated (2025)    Received from [facility]    Social Connections      Do you often feel lonely or isolated from those around you?: 4   Interpersonal Safety:  Not on file   Housing Stability: Low Risk  (2/5/2025)    Received from [facility]    Housing Stability      What is your housing situation today?: 1           Medications  Allergies   Current Outpatient Medications   Medication Sig Dispense Refill     albuterol (PROAIR HFA/PROVENTIL HFA/VENTOLIN HFA) 108 (90 Base) MCG/ACT inhaler Inhale 2 puffs into the lungs 2 times daily as needed for shortness of breath / dyspnea or wheezing       amLODIPine (NORVASC) 5 MG tablet Take 1 tablet (5 mg) by mouth daily Hold for SBP < 100 or DBP < 60 60 tablet 1     ARIPiprazole (ABILIFY) 20 MG tablet Take 10 mg by mouth every evening.       atorvastatin (LIPITOR) 10 MG tablet Take 10 mg by mouth every evening       brimonidine-timolol (COMBIGAN) 0.2-0.5 % ophthalmic solution Place 1 drop into both eyes 2 times daily       clopidogrel (PLAVIX) 75 MG tablet Take 1 tablet (75 mg) by mouth daily for 90 days       Continuous Glucose Sensor (FREESTYLE SHASHI 3 PLUS SENSOR) MISC        donepezil (ARICEPT) 10 MG tablet Take 10 mg by mouth daily       escitalopram (LEXAPRO) 20 MG tablet Take 20 mg by mouth every evening       gabapentin (NEURONTIN) 400 MG capsule Take 1,200 mg by mouth 2 times daily       insulin glargine (LANTUS PEN) 100 UNIT/ML pen Inject 38 Units subcutaneously every morning.       lamoTRIgine (LAMICTAL) 100 MG tablet Take 300 mg by mouth every evening       metFORMIN (GLUCOPHAGE-XR) 500 MG 24 hr tablet Take 500 mg by mouth 2 times daily (with meals)       montelukast (SINGULAIR) 10 MG tablet Take 10 mg by mouth At Bedtime       OLANZapine (ZYPREXA) 10 MG tablet Take 5 mg by mouth at bedtime. Patient is taking 2.5 tablets       semaglutide (OZEMPIC, 0.25 OR 0.5 MG/DOSE,) 2 MG/1.5ML SOPN pen Inject 2 mg Subcutaneous every 7 days       venlafaxine (EFFEXOR-XR) 75 MG 24 hr capsule Take 225 mg by mouth daily       aspirin (ASA) 325 MG EC tablet Take 1 tablet (325 mg) by mouth daily (Patient not  taking: Reported on 4/3/2025) 30 tablet 1     metoprolol succinate ER (TOPROL XL) 50 MG 24 hr tablet Take 1 tablet (50 mg) by mouth daily Hold for SBP < 100 or DBP < 60 or HR < 50 (Patient not taking: Reported on 4/3/2025) 60 tablet 1     traZODone (DESYREL) 150 MG tablet Take 300 mg by mouth At Bedtime (Patient not taking: Reported on 4/3/2025)         Allergies   Allergen Reactions     Bupropion Diarrhea     Erythromycin Diarrhea     Theophylline GI Disturbance          Lab Results    Chemistry/lipid CBC Cardiac Enzymes/BNP/TSH/INR   Recent Labs   Lab Test 06/28/24  1428   CHOL 102   HDL 32*   LDL 52   TRIG 88     Recent Labs   Lab Test 06/28/24  1428 12/30/22  1935 12/16/21  1127   LDL 52 72 70     Recent Labs   Lab Test 03/28/25  1122      POTASSIUM 5.1   CHLORIDE 107   CO2 30*   *   BUN 17.8   CR 1.28*   GFRESTIMATED 59*   JAYME 8.5*     Recent Labs   Lab Test 03/28/25  1122 06/29/24  0731 06/28/24  1428   CR 1.28* 1.05 1.27*     Recent Labs   Lab Test 06/28/24  1428 12/30/22  1656 03/23/21  0000   A1C 7.5* 7.2* 7.8*          Recent Labs   Lab Test 03/28/25  1122   WBC 10.6   HGB 11.0*   HCT 34.3*   MCV 99        Recent Labs   Lab Test 03/28/25  1122 06/29/24  0731 06/28/24  1428   HGB 11.0* 11.6* 10.3*    Recent Labs   Lab Test 04/23/20  1545   TROPONINI <0.01     Recent Labs   Lab Test 04/23/20  1545   BNP <10     Recent Labs   Lab Test 12/30/22  1656   TSH 3.16     Recent Labs   Lab Test 06/28/24  1428 01/04/23  1650 12/30/22  1935   INR 1.10 1.08 0.93        Viky Carrillo MD  Noninvasive Cardiologist   [facility]                                        Thank you for allowing me to participate in the care of your patient.      Sincerely,     Viky Carrillo MD     [facility]  cc:   Referred Self, MD  No address on file

## 2025-04-03 NOTE — PATIENT INSTRUCTIONS
Your symptoms are a bit atypical for heart issues, but that is still a possibility.  Will check a stress test and if normal can follow up with your primary doctor for other causes of the symptoms    Your blood pressure is a bit high, will add Lisinopril 2.5mg which also helps protect the kidneys from damage with diabetes

## 2025-04-11 ENCOUNTER — HOSPITAL ENCOUNTER (OUTPATIENT)
Dept: NUCLEAR MEDICINE | Facility: HOSPITAL | Age: 75
Discharge: HOME | End: 2025-04-11
Attending: INTERNAL MEDICINE
Payer: COMMERCIAL

## 2025-04-11 ENCOUNTER — HOSPITAL ENCOUNTER (OUTPATIENT)
Dept: CARDIOLOGY | Facility: HOSPITAL | Age: 75
Discharge: HOME | End: 2025-04-11
Attending: INTERNAL MEDICINE
Payer: COMMERCIAL

## 2025-04-11 DIAGNOSIS — R07.89 CHEST PAIN, ATYPICAL: ICD-10-CM

## 2025-04-11 LAB
CV STRESS CURRENT BP HE: NORMAL
CV STRESS CURRENT HR HE: 65
CV STRESS CURRENT HR HE: 68
CV STRESS CURRENT HR HE: 69
CV STRESS CURRENT HR HE: 78
CV STRESS CURRENT HR HE: 80
CV STRESS CURRENT HR HE: 82
CV STRESS CURRENT HR HE: 84
CV STRESS CURRENT HR HE: 87
CV STRESS DEVIATION TIME HE: NORMAL
CV STRESS ECHO PERCENT HR HE: NORMAL
CV STRESS EXERCISE STAGE HE: NORMAL
CV STRESS FINAL RESTING BP HE: NORMAL
CV STRESS FINAL RESTING HR HE: 78
CV STRESS MAX HR HE: 98
CV STRESS MAX TREADMILL GRADE HE: 0
CV STRESS MAX TREADMILL SPEED HE: 0
CV STRESS PEAK DIA BP HE: NORMAL
CV STRESS PEAK SYS BP HE: NORMAL
CV STRESS PHASE HE: NORMAL
CV STRESS PROTOCOL HE: NORMAL
CV STRESS RESTING PT POSITION HE: NORMAL
CV STRESS ST DEVIATION AMOUNT HE: NORMAL
CV STRESS ST DEVIATION ELEVATION HE: NORMAL
CV STRESS ST EVELATION AMOUNT HE: NORMAL
CV STRESS TEST TYPE HE: NORMAL
CV STRESS TOTAL STAGE TIME MIN 1 HE: NORMAL
NUC STRESS EJECTION FRACTION: 68 %
RATE PRESSURE PRODUCT: NORMAL
STRESS ECHO BASELINE DIASTOLIC HE: 82
STRESS ECHO BASELINE HR: 68
STRESS ECHO BASELINE SYSTOLIC BP: 149
STRESS ECHO CALCULATED PERCENT HR: 67 %
STRESS ECHO LAST STRESS DIASTOLIC BP: 86
STRESS ECHO LAST STRESS HR: 82
STRESS ECHO LAST STRESS SYSTOLIC BP: 167
STRESS ECHO TARGET HR: 146

## 2025-04-11 PROCEDURE — A9500 TC99M SESTAMIBI: HCPCS | Performed by: INTERNAL MEDICINE

## 2025-04-11 PROCEDURE — 78452 HT MUSCLE IMAGE SPECT MULT: CPT

## 2025-04-11 PROCEDURE — 343N000001 HC RX 343 MED OP 636: Performed by: INTERNAL MEDICINE

## 2025-04-11 PROCEDURE — 93018 CV STRESS TEST I&R ONLY: CPT | Performed by: INTERNAL MEDICINE

## 2025-04-11 PROCEDURE — 250N000011 HC RX IP 250 OP 636: Mod: JZ | Performed by: INTERNAL MEDICINE

## 2025-04-11 PROCEDURE — 78452 HT MUSCLE IMAGE SPECT MULT: CPT | Mod: 26 | Performed by: INTERNAL MEDICINE

## 2025-04-11 PROCEDURE — 93017 CV STRESS TEST TRACING ONLY: CPT

## 2025-04-11 PROCEDURE — 93016 CV STRESS TEST SUPVJ ONLY: CPT | Performed by: INTERNAL MEDICINE

## 2025-04-11 RX ORDER — AMINOPHYLLINE 25 MG/ML
50-100 INJECTION, SOLUTION INTRAVENOUS
Status: DISCONTINUED | OUTPATIENT
Start: 2025-04-11 | End: 2025-04-12 | Stop reason: HOSPADM

## 2025-04-11 RX ORDER — REGADENOSON 0.08 MG/ML
0.4 INJECTION, SOLUTION INTRAVENOUS ONCE
Status: COMPLETED | OUTPATIENT
Start: 2025-04-11 | End: 2025-04-11

## 2025-04-11 RX ADMIN — Medication 31.5 MILLICURIE: at 14:33

## 2025-04-11 RX ADMIN — Medication 8 MILLICURIE: at 13:14

## 2025-04-11 RX ADMIN — REGADENOSON 0.4 MG: 0.08 INJECTION, SOLUTION INTRAVENOUS at 14:06

## 2025-06-18 ENCOUNTER — APPOINTMENT (OUTPATIENT)
Dept: CT IMAGING | Facility: HOSPITAL | Age: 75
End: 2025-06-18
Attending: EMERGENCY MEDICINE
Payer: COMMERCIAL

## 2025-06-18 ENCOUNTER — HOSPITAL ENCOUNTER (EMERGENCY)
Facility: HOSPITAL | Age: 75
Discharge: HOME | End: 2025-06-18
Attending: EMERGENCY MEDICINE
Payer: COMMERCIAL

## 2025-06-18 VITALS
DIASTOLIC BLOOD PRESSURE: 84 MMHG | OXYGEN SATURATION: 94 % | HEIGHT: 64 IN | WEIGHT: 190 LBS | BODY MASS INDEX: 32.44 KG/M2 | SYSTOLIC BLOOD PRESSURE: 184 MMHG | TEMPERATURE: 96.9 F | HEART RATE: 86 BPM | RESPIRATION RATE: 18 BRPM

## 2025-06-18 DIAGNOSIS — R55 SYNCOPE, UNSPECIFIED SYNCOPE TYPE: ICD-10-CM

## 2025-06-18 LAB
ANION GAP SERPL CALCULATED.3IONS-SCNC: 8 MMOL/L (ref 7–15)
BASOPHILS # BLD AUTO: 0.1 10E3/UL (ref 0–0.2)
BASOPHILS NFR BLD AUTO: 1 %
BUN SERPL-MCNC: 24.8 MG/DL (ref 8–23)
CALCIUM SERPL-MCNC: 9 MG/DL (ref 8.8–10.4)
CHLORIDE SERPL-SCNC: 105 MMOL/L (ref 98–107)
CREAT SERPL-MCNC: 1.35 MG/DL (ref 0.67–1.17)
EGFRCR SERPLBLD CKD-EPI 2021: 55 ML/MIN/1.73M2
EOSINOPHIL # BLD AUTO: 0.6 10E3/UL (ref 0–0.7)
EOSINOPHIL NFR BLD AUTO: 7 %
ERYTHROCYTE [DISTWIDTH] IN BLOOD BY AUTOMATED COUNT: 12 % (ref 10–15)
GLUCOSE SERPL-MCNC: 107 MG/DL (ref 70–99)
HCO3 SERPL-SCNC: 29 MMOL/L (ref 22–29)
HCT VFR BLD AUTO: 36.7 % (ref 40–53)
HGB BLD-MCNC: 11.6 G/DL (ref 13.3–17.7)
IMM GRANULOCYTES # BLD: 0 10E3/UL
IMM GRANULOCYTES NFR BLD: 1 %
LYMPHOCYTES # BLD AUTO: 2.1 10E3/UL (ref 0.8–5.3)
LYMPHOCYTES NFR BLD AUTO: 25 %
MCH RBC QN AUTO: 31.2 PG (ref 26.5–33)
MCHC RBC AUTO-ENTMCNC: 31.6 G/DL (ref 31.5–36.5)
MCV RBC AUTO: 99 FL (ref 78–100)
MONOCYTES # BLD AUTO: 0.6 10E3/UL (ref 0–1.3)
MONOCYTES NFR BLD AUTO: 7 %
NEUTROPHILS # BLD AUTO: 5.2 10E3/UL (ref 1.6–8.3)
NEUTROPHILS NFR BLD AUTO: 60 %
NRBC # BLD AUTO: 0 10E3/UL
NRBC BLD AUTO-RTO: 0 /100
PLATELET # BLD AUTO: 305 10E3/UL (ref 150–450)
POTASSIUM SERPL-SCNC: 4.4 MMOL/L (ref 3.4–5.3)
RBC # BLD AUTO: 3.72 10E6/UL (ref 4.4–5.9)
SODIUM SERPL-SCNC: 142 MMOL/L (ref 135–145)
TROPONIN T SERPL HS-MCNC: 21 NG/L
TROPONIN T SERPL HS-MCNC: 24 NG/L
WBC # BLD AUTO: 8.6 10E3/UL (ref 4–11)

## 2025-06-18 PROCEDURE — 93005 ELECTROCARDIOGRAM TRACING: CPT | Performed by: STUDENT IN AN ORGANIZED HEALTH CARE EDUCATION/TRAINING PROGRAM

## 2025-06-18 PROCEDURE — 85004 AUTOMATED DIFF WBC COUNT: CPT | Performed by: EMERGENCY MEDICINE

## 2025-06-18 PROCEDURE — 258N000003 HC RX IP 258 OP 636: Performed by: EMERGENCY MEDICINE

## 2025-06-18 PROCEDURE — 93005 ELECTROCARDIOGRAM TRACING: CPT | Performed by: EMERGENCY MEDICINE

## 2025-06-18 PROCEDURE — 80048 BASIC METABOLIC PNL TOTAL CA: CPT | Performed by: EMERGENCY MEDICINE

## 2025-06-18 PROCEDURE — 70450 CT HEAD/BRAIN W/O DYE: CPT

## 2025-06-18 PROCEDURE — 36415 COLL VENOUS BLD VENIPUNCTURE: CPT | Performed by: EMERGENCY MEDICINE

## 2025-06-18 PROCEDURE — 99285 EMERGENCY DEPT VISIT HI MDM: CPT | Mod: 25

## 2025-06-18 PROCEDURE — 84484 ASSAY OF TROPONIN QUANT: CPT | Performed by: EMERGENCY MEDICINE

## 2025-06-18 PROCEDURE — 96360 HYDRATION IV INFUSION INIT: CPT

## 2025-06-18 RX ADMIN — SODIUM CHLORIDE 500 ML: 0.9 INJECTION, SOLUTION INTRAVENOUS at 18:08

## 2025-06-18 ASSESSMENT — ACTIVITIES OF DAILY LIVING (ADL)
ADLS_ACUITY_SCORE: 59

## 2025-06-18 ASSESSMENT — COLUMBIA-SUICIDE SEVERITY RATING SCALE - C-SSRS
6. HAVE YOU EVER DONE ANYTHING, STARTED TO DO ANYTHING, OR PREPARED TO DO ANYTHING TO END YOUR LIFE?: NO
1. IN THE PAST MONTH, HAVE YOU WISHED YOU WERE DEAD OR WISHED YOU COULD GO TO SLEEP AND NOT WAKE UP?: NO
2. HAVE YOU ACTUALLY HAD ANY THOUGHTS OF KILLING YOURSELF IN THE PAST MONTH?: NO

## 2025-06-18 NOTE — ED PROVIDER NOTES
EMERGENCY DEPARTMENT ENCOUNTER      NAME: Montez Moser  AGE: 74 year old male  YOB: 1950  MRN: [id number]  EVALUATION DATE & TIME: No admission date for patient encounter.    PCP: Lori Cerrato    ED PROVIDER: Sandra Delacruz MD    Chief Complaint   Patient presents with    Syncope    Dizziness         FINAL IMPRESSION:  1. Syncope, unspecified syncope type          ED COURSE & MEDICAL DECISION MAKING:    Pertinent Labs & Imaging studies reviewed. (See chart for details)  74 year old male with history of HTN, HLD, asthma, DM and depression who presents to the Emergency Department for evaluation of syncopal episode.  Went from a seated to standing position, texting a remembers was waking up on the floor after feeling dizzy.  His examination is unremarkable here.  Differential includes vasovagal syncope, transient hypotension/postural hypotension, dehydration, electrolyte abnormality, symptomatic anemia, arrhythmia.  Patient has not had any shortness of breath, chest pain nor PE risk factors to suspect same.  He does note a mild right frontal headache.  Unclear whether he hit his head when his fall so will obtain neuroimaging to evaluate for intracranial hemorrhage though my suspicion is low.    Patient placed on monitor, IV established and blood obtained.  Twelve-lead EKG shows sinus rhythm with nonspecific ST abnormalities unchanged from his previous.  Patient given 500 mL normal saline bolus.  CBC BMP and troponin notable for creatinine of 1.35, hemoglobin 11.6 which are more or less at baseline.  Troponin at 24, downtrending 21 on repeat.  CT head obtained and unremarkable.  Patient offered admission seems that he did not have much for prodromal symptoms but declines.  Ambulatory here without symptoms.  Will have him follow-up with primary care and cardiology referral provided.      ED Course as of 06/18/25 2248 Wed Jun 18, 2025   1542 I met with the patient to obtain patient history  and performed a physical exam. Discussed plan for ED work up including potential diagnostic studies and interventions.   1651 Spoke w wife. She was w pt. He stood up from chair, fainted and fell down on the floor, unconscious x10 minutes. Unsure if pt hit head. No shaking/seizure activity. Has been well recently, wasn't complaining of anything when woke up.    1714 Hemoglobin(!): 11.6  baseline   1714 Creatinine(!): 1.35  1.2 baseline   1714 Troponin T, High Sensitivity(!): 24  Appears to be baseline   1725 Head CT w/o contrast  CT head independently interpreted by myself no visualized ICH   1817 Ambulated without issues   2017 Patient refusing hospital admission   2018 We discussed the plan for discharge and the patient is agreeable. Reviewed supportive cares, symptomatic treatment, outpatient follow up, and reasons to return to the Emergency Department. Patient to be discharged by ED RN.        Medical Decision Making  I obtained history from Family Member/Significant Other and EMS  I reviewed the EMR: Outpatient Record: 4/3/2025 clinic visit  Discharge. No recommendations on prescription strength medication(s). See documentation for any additional details.    MIPS (CTPE, Dental pain, Castillo, Sinusitis, Asthma/COPD, Head Trauma): Adult Minor Head Trauma:Age 65 years or older    SEPSIS: None          At the conclusion of the encounter I discussed the results of all of the tests and the disposition. The questions were answered. The patient or family acknowledged understanding and was agreeable with the care plan.      MEDICATIONS GIVEN IN THE EMERGENCY:  Medications   sodium chloride 0.9% BOLUS 500 mL (0 mLs Intravenous Stopped 6/18/25 1930)       NEW PRESCRIPTIONS STARTED AT TODAY'S ER VISIT  Discharge Medication List as of 6/18/2025  8:17 PM             =================================================================    HPI    Patient information was obtained from: patient    Use of Intrepreter: N/A       Montez EPSTEIN  "Ehsan is a 74 year old male with pertinent medical history of TIA, mild cognitive impairment, DM2, HLD, HTN, bipolar I disorder, who presents syncope.    Patient reports around 1530 today, he was standing up from his chair when he suddenly had a syncopal episode. Denies any light headedness, dizziness, shortness of breath, chest pain, or palpitations. This was not witnessed. Wife thinks he had LOC for about 10 minutes. Unsure if he hit his head. He is not anticoagulated. States that he felt fine prior to episode. Currently, he feels a little \"off\" and has a mild right sided headache (right forehead, 3/10 pain). He hasn't taken any pain medications PTA. He denies previous cardiac history of aneurysms. Otherwise denies associating nausea or vomiting. There were no other concerns/complaints at this time.      PAST MEDICAL HISTORY:  Past Medical History:   Diagnosis Date    Anxiety 04/24/2020    Asthma exacerbation 09/11/2017    Bipolar I disorder (H) 04/24/2020    Controlled type 2 diabetes mellitus, without long-term current use of insulin (H) 09/11/2017    Mixed hyperlipidemia     Recurrent major depressive disorder 09/11/2017    Respiratory insufficiency 09/11/2017       PAST SURGICAL HISTORY:  Past Surgical History:   Procedure Laterality Date    FOOT SURGERY      OTHER SURGICAL HISTORY      ARM SURGERY       CURRENT MEDICATIONS:    Prior to Admission Medications   Prescriptions Last Dose Informant Patient Reported? Taking?   ARIPiprazole (ABILIFY) 20 MG tablet   Yes No   Sig: Take 10 mg by mouth every evening.   Continuous Glucose Sensor (FREESTYLE SHASHI 3 PLUS SENSOR) MISC   Yes No   OLANZapine (ZYPREXA) 10 MG tablet   Yes No   Sig: Take 5 mg by mouth at bedtime. Patient is taking 2.5 tablets   albuterol (PROAIR HFA/PROVENTIL HFA/VENTOLIN HFA) 108 (90 Base) MCG/ACT inhaler   Yes No   Sig: Inhale 2 puffs into the lungs 2 times daily as needed for shortness of breath / dyspnea or wheezing   amLODIPine (NORVASC) 5 " MG tablet   No No   Sig: Take 1 tablet (5 mg) by mouth daily Hold for SBP < 100 or DBP < 60   aspirin (ASA) 325 MG EC tablet   No No   Sig: Take 1 tablet (325 mg) by mouth daily   Patient not taking: Reported on 4/3/2025   atorvastatin (LIPITOR) 10 MG tablet   Yes No   Sig: Take 10 mg by mouth every evening   brimonidine-timolol (COMBIGAN) 0.2-0.5 % ophthalmic solution   Yes No   Sig: Place 1 drop into both eyes 2 times daily   clopidogrel (PLAVIX) 75 MG tablet   No No   Sig: Take 1 tablet (75 mg) by mouth daily for 90 days   donepezil (ARICEPT) 10 MG tablet   Yes No   Sig: Take 10 mg by mouth daily   escitalopram (LEXAPRO) 20 MG tablet   Yes No   Sig: Take 20 mg by mouth every evening   gabapentin (NEURONTIN) 400 MG capsule   Yes No   Sig: Take 1,200 mg by mouth 2 times daily   insulin glargine (LANTUS PEN) 100 UNIT/ML pen   Yes No   Sig: Inject 38 Units subcutaneously every morning.   lamoTRIgine (LAMICTAL) 100 MG tablet   Yes No   Sig: Take 300 mg by mouth every evening   lisinopril (ZESTRIL) 2.5 MG tablet   No No   Sig: Take 1 tablet (2.5 mg) by mouth daily.   metFORMIN (GLUCOPHAGE-XR) 500 MG 24 hr tablet   Yes No   Sig: Take 500 mg by mouth 2 times daily (with meals)   metoprolol succinate ER (TOPROL XL) 50 MG 24 hr tablet   No No   Sig: Take 1 tablet (50 mg) by mouth daily Hold for SBP < 100 or DBP < 60 or HR < 50   Patient not taking: Reported on 4/3/2025   montelukast (SINGULAIR) 10 MG tablet   Yes No   Sig: Take 10 mg by mouth At Bedtime   semaglutide (OZEMPIC, 0.25 OR 0.5 MG/DOSE,) 2 MG/1.5ML SOPN pen   Yes No   Sig: Inject 2 mg Subcutaneous every 7 days   traZODone (DESYREL) 150 MG tablet   Yes No   Sig: Take 300 mg by mouth At Bedtime   Patient not taking: Reported on 4/3/2025   venlafaxine (EFFEXOR-XR) 75 MG 24 hr capsule   Yes No   Sig: Take 225 mg by mouth daily      Facility-Administered Medications: None       ALLERGIES:  Allergies   Allergen Reactions    Bupropion Diarrhea    Erythromycin Diarrhea  "   Theophylline GI Disturbance       FAMILY HISTORY:  History reviewed. No pertinent family history.    SOCIAL HISTORY:  Social History     Tobacco Use    Smoking status: Former     Current packs/day: 0.00     Types: Cigarettes     Quit date: 1977     Years since quittin.8    Smokeless tobacco: Never   Substance Use Topics    Alcohol use: No     Comment: Alcoholic Drinks/day: quit years ago    Drug use: No        VITALS:  Patient Vitals for the past 24 hrs:   BP Temp Temp src Pulse Resp SpO2 Height Weight   25 (!) 184/84 -- -- 86 -- 94 % -- --   25 (!) 182/85 -- -- 82 18 96 % -- --   25 (!) 181/ -- -- 76 14 94 % -- --   25 193 (!) 181/ -- -- 79 19 -- -- --   25 191 (!) 178/84 -- -- 72 18 94 % -- --   25 1859 (!) 160/77 -- -- 65 13 98 % -- --   25 1844 (!) 159/80 -- -- 70 16 94 % -- --   25 1830 (!) 176/79 -- -- 73 17 95 % -- --   25 1815 (!) 158/88 -- -- 74 19 95 % -- --   25 1800 (!) 176/84 -- -- -- -- -- -- --   25 1632 (!) 183/79 96.9  F (36.1  C) Temporal 71 18 93 % 1.626 m (5' 4\") 86.2 kg (190 lb)       PHYSICAL EXAM    General Appearance: Well-appearing, well-nourished, no acute distress   Head:  Normocephalic, atraumatic  Eyes:  PERRL, conjunctiva/corneas clear, EOM's intact  ENT: Wearing hearing aids. Lips, mucosa, and tongue normal; membranes are moist without pallor  Neck:  Supple, midline tenderness palpation  Cardio:  Regular rate and rhythm, no murmur/gallop/rub, hypertensive  Pulm:  No respiratory distress, clear to auscultation bilaterally  Back:  No midline tenderness to palpation, no paraspinal tenderness  Abdomen: Rotund. Soft, non-tender, non distended,no rebound or guarding.  Extremities: Moves extremities normally, normal gait  Skin:  Skin warm, dry, no rashes  Neuro:  Alert and oriented ×3, moving all extremities, no gross sensory defects     RADIOLOGY/LABS:  Reviewed all pertinent imaging. Please " see official radiology report. All pertinent labs reviewed and interpreted.    Results for orders placed or performed during the hospital encounter of 06/18/25   Head CT w/o contrast    Impression    IMPRESSION:  1.  No acute intracranial abnormality.   Basic metabolic panel   Result Value Ref Range    Sodium 142 135 - 145 mmol/L    Potassium 4.4 3.4 - 5.3 mmol/L    Chloride 105 98 - 107 mmol/L    Carbon Dioxide (CO2) 29 22 - 29 mmol/L    Anion Gap 8 7 - 15 mmol/L    Urea Nitrogen 24.8 (H) 8.0 - 23.0 mg/dL    Creatinine 1.35 (H) 0.67 - 1.17 mg/dL    GFR Estimate 55 (L) >60 mL/min/1.73m2    Calcium 9.0 8.8 - 10.4 mg/dL    Glucose 107 (H) 70 - 99 mg/dL   Result Value Ref Range    Troponin T, High Sensitivity 24 (H) <=22 ng/L   CBC with platelets and differential   Result Value Ref Range    WBC Count 8.6 4.0 - 11.0 10e3/uL    RBC Count 3.72 (L) 4.40 - 5.90 10e6/uL    Hemoglobin 11.6 (L) 13.3 - 17.7 g/dL    Hematocrit 36.7 (L) 40.0 - 53.0 %    MCV 99 78 - 100 fL    MCH 31.2 26.5 - 33.0 pg    MCHC 31.6 31.5 - 36.5 g/dL    RDW 12.0 10.0 - 15.0 %    Platelet Count 305 150 - 450 10e3/uL    % Neutrophils 60 %    % Lymphocytes 25 %    % Monocytes 7 %    % Eosinophils 7 %    % Basophils 1 %    % Immature Granulocytes 1 %    NRBCs per 100 WBC 0 <1 /100    Absolute Neutrophils 5.2 1.6 - 8.3 10e3/uL    Absolute Lymphocytes 2.1 0.8 - 5.3 10e3/uL    Absolute Monocytes 0.6 0.0 - 1.3 10e3/uL    Absolute Eosinophils 0.6 0.0 - 0.7 10e3/uL    Absolute Basophils 0.1 0.0 - 0.2 10e3/uL    Absolute Immature Granulocytes 0.0 <=0.4 10e3/uL    Absolute NRBCs 0.0 10e3/uL   Result Value Ref Range    Troponin T, High Sensitivity 21 <=22 ng/L       EKG:  Performed at: 6/18/2025 16:31:46    Impression: Normal Sinus rhythm, ST abnormality, Nonspecific ST changes, Abnormal ECG    Rate: 68  Rhythm: Sinus rhythm  Axis: 26  VT Interval: 150  QRS Interval: 88  QTc Interval: 421  ST Changes: None  Comparison: No significant changes found when compared to  ECG of 5/28/2025 12:20  I have independently reviewed and interpreted the EKG(s) documented above.    The creation of this record is based on the scribe s observations of the work being performed by Sandra Delacruz MD and the provider s statements to them. It was created on her behalf by Carmen Head, a trained medical scribe. This document has been checked and approved by the attending provider.    Sandra Delacruz MD  Emergency Medicine  [facility] EMERGENCY DEPARTMENT  [address] [place] [id number]  [phone]  Dept: [phone]     Sandra Delacruz MD  06/18/25 9632

## 2025-06-18 NOTE — ED TRIAGE NOTES
Pt brought in by [facility] for syncopal episode.  Pt stood and was unconscious for at least 10 minutes.  Pt's BG for EMS was 136.  Pt is unsure what he takes medications for, but states he takes all his medications as prescribed.  Pt dizzy when transferring into chair from stretcher.  Pt has 18G IV in place started by EMS.      Triage Assessment (Adult)       Row Name 06/18/25 1632          Triage Assessment    Airway WDL WDL        Respiratory WDL    Respiratory WDL WDL        Skin Circulation/Temperature WDL    Skin Circulation/Temperature WDL WDL        Cardiac WDL    Cardiac WDL X  syncopal episode        Peripheral/Neurovascular WDL    Peripheral Neurovascular WDL WDL        Cognitive/Neuro/Behavioral WDL    Cognitive/Neuro/Behavioral WDL WDL

## 2025-06-19 NOTE — ED NOTES
The patient was able to walk going out of the room and back. No pain or dizziness reported. MD informed.

## 2025-06-21 LAB
ATRIAL RATE - MUSE: 68 BPM
DIASTOLIC BLOOD PRESSURE - MUSE: NORMAL MMHG
INTERPRETATION ECG - MUSE: NORMAL
P AXIS - MUSE: 52 DEGREES
PR INTERVAL - MUSE: 150 MS
QRS DURATION - MUSE: 88 MS
QT - MUSE: 396 MS
QTC - MUSE: 421 MS
R AXIS - MUSE: 26 DEGREES
SYSTOLIC BLOOD PRESSURE - MUSE: NORMAL MMHG
T AXIS - MUSE: 49 DEGREES
VENTRICULAR RATE- MUSE: 68 BPM

## 2025-07-08 NOTE — PROGRESS NOTES
"25 y.o. female  at 19w1d   She c/o some occasional pressure at times because "he sits low".  Reports good fetal movement or fluttering. Denies any vaginal bleeding, leakage of fluid, cramping, contractions, or pressure.   Total weight gain/weight loss in pregnancy: 6.804 kg (15 lb)     Vitals  BP: 104/72  Pulse: 72  Weight: 95.3 kg (210 lb)  Prenatal  Fundal Height (cm): 20 cm  Fetal Heart Rate: 152  Movement: Present  Urine Albumin/Glucose  Urine Albumin: Negative  Urine Glucose: Negative  Edema  LLE Edema: None  RLE Edema: None  Facial: None  Additional Edema?: No    Prenatal Labs:  Lab Results   Component Value Date    GROUPTRH B POS 2024    HGB 10.7 (L) 2024    HCT 33.5 (L) 2024     2024    SICKLE Negative 2024    HEPBSAG Non-Reactive 2024    [id number] Non-Reactive 2024    LABNGO Negative 2024    [id number] Positive (AA) 2021       A: 19w1d           ICD-10-CM ICD-9-CM    1. 19 weeks gestation of pregnancy  Z3A.19 V22.2 POCT URINALYSIS          P: Bleeding, daily fetal kick counts, and  labor/labor precautions discussed.    The following were addressed during this visit:    17-20 Weeks  - Quickening   - Lifestyle   - Ultrasound   - Importance of Early and Frequent Breastfeeding   - Baby-led Feeding   - Frequent feeding to help assure optimal milk production       Questions answered to desired level of satisfaction  Verbalized understanding to all information and instructions provided.  Follow up in about 4 weeks (around 2024), or if symptoms worsen or fail to improve, for SARAH Davey, JENNIFER, CNM, WHNP-BC                " MTM ENCOUNTER  SUBJECTIVE/OBJECTIVE:                           Montez Cabrera is a 69 year old male called for a transitions of care visit. He was discharged from [facility]  to  acute respiratory failure with hypoxia, acute asthma exacerbation. Patient was accompanied by his wife Angie.     Wife angie    Medications   Name strength formulation, Sig: take route frequency   Increase glimepiride 4 mg tablet, Si tab(s) orally 2 times daily (before meals) Start Date: 2019 Stop Date: 2021    Continue Lantus Solostar Pen 100 units/mL solution, Si units subcutaneously 2 times a day (morning and bedtime) Start Date: 10/29/2019    Continue Metformin Hydrochloride  Tablet 24 Hour Sustained Release, Sig: TAKE 2 TABLETS BY MOUTH TWICE DAILY 2 tabs AM and PM     Not taking guaifenesin 100 mg/5 mL liquid, Sig: 10 ml orally every 4 hours as needed for cough Start Date: 2020    Taking trazodone 150mg tablet, Si tabs orally once a day at bed time    Taking One Touch Ultra lancets lancets, Sig: as directed once daily    Taking one touch ultra 2 glucometer , Sig: as directed Start Date: 2013    Taking olanzapine 10 mg tablet, Si tab(s) orally once a day at bedtime    Taking Novofine as directed UF pen needles 32g x 4, Sig: as directed as directed Start Date: 2017    Taking Neurontin 400mg capsule, Sig: 3 caps orally 2 times a day (morning and bedtime)    Taking naproxen 500 MG Tablet, Si tab(s) orally 2 times a day    Taking Lipitor 10 Tablet, Si tab(s) orally once a day    Taking Lexapro 20 mg tablet, Si tab(s) orally once a day    Taking Lamictal 100mg , Sig: 3 tabs daily at bedtime    Taking Effexor XR 75 mg capsule, extended release, Si tabs in morning and 1 tab at bedtime orally     Taking DME:Nebulizer , Sig: as directed Start Date: 2018    Taking Diabetic Test Strips - Strips, Sig: as directed once daily    Taking Ambien 10 mg , Si/2 tab Oral  Situation:  Follow up outreach    [place]ouza Assessments:  Mr. Spencer is doing well overall. He complete his sleep study which was negative for sleep apnea, he was advised to avoid sleeping supine. IR liver scan was cancelled, it was noted to not be necessary as recent imaging is unchanged. See care plan for additional documentation.    Actions Taken:  CM reviewed care plan goal progress with patient    Program Plan:  Care Manager will contact patient via telephone during week of 8/4/2025 .  Topics to be discussed on next call: care plan goal progress, review care gaps      At time of assessment, no abnormalities or unaddressed needs of Mr. Spencer were identified by the care manager, other than those documented. The care manager will continue to monitor for and address future needs of Mr. Spencer as they arise.        See hyperlinks within encounter for full documentation.    "daily at bedtime    Taking Abilify 20 mg tablet, Si tab(s) orally once a day Start Date: 2020    Continue lisinopril 10 mg tablet, Si tab(s) orally once a day in morning Start Date: 2020    Continue montelukast 10 mg tablet, Si tab(s) orally once a day PM    Continue prednisone 20 mg tablet, Si tabs orally once a day with meal Start Date: 2020 Stop Date: 2020    Continue Albuterol solution for nebulizer 0.083% premix neb solution, Sig: 3 ml inhaled Q6H as needed for cough      1. Hospital Follow [facility] /asthma. 2. DIABETES CHECK DUE- had an increase of Lantus to 25 units twice daily. 3. Hypertension: Started Lisinopril because of diabetes- CHECK BMP TODAY. 4. Hyperlipidemia- restarted Lipitor. 5. Discuss insulin. 6. Phelgm in the throat.   Patient with type 2 diabetes, questionable control previously with last A1c of 8.8 on 2019- very high sugars in the hospital. Taking metformin 2 tabs twice daily, glimepride 1 tab am, 1/2 tab pm, and Lantus. THe lantus was titrated up from 10 units to 25 units prior to hospitalization, then to 25 units twice daily in the hospital Sugars have been up and down at home 100-300 depending on timing.        Patient consented to a telehealth visit: yes  Telemedicine Visit Details  Type of service:  Telephone visit  Start Time: {video/phone visit start time:1529}  End Time: {video/phone visit end time:1529}  Originating Location (pt. Location): {video visit patient location:408070::\"Home\"}  Distant Location (provider location):  [facility] [place]  Mode of Communication:  {telemedmtm2:899368::\"Telephone\"}    Chief Complaint: ***.  Meghan is wondering what time of day he should take his medications.  Personal Healthcare Goals: ***    Allergies/ADRs: Erythromycin - diarrhea, Guy-Dur - stomach upset, Wellbutrin - agressive     Tobacco:  has no history on file for tobacco.{Tobacco Cessation needed for ACO -- Delete if " patient is a non-smoker:351208}  Alcohol: {ALCOHOL CONSUMPTION HX:924388}  Caffeine: {CAFFEINE INTAKE:113657}  Activity: ***  PMH: {/3/:796151}    Medication Adherence/Access:   Pt takes his AM meds around 10:30-12, evening meds would be bedtime.  Pt doesn't eat much, usually just a small snack bar.    Patient {medadmin:944235}.      HPI:  Pt presented to ED with dypsnea.  Pt was diagnosed with acute respiratory failure with hypoxia secondary to asthma exacerbation.  His chest x-ray was negative for pneumonia., was negative for COVID19.  Pt improved on nebs, oxygen support, and IV prednisone.  Was switched to an oral prednisone taper. Pt had chest pains, but was likely secondary to coughing. Echo was unremarkable with EF of 60%.  Pt's BP was elevated in the hospital, and was started on lisinopril with diabetes.    - Elevated hemoglobin A1c to 9.2  - Worsening blood sugar today because of steroid  - Change sliding scale to resistant  - Add carb counting insulin coverage  - Increase Lantus to 25 units twice daily        [facility]  to  acute respiratory failure with hypoxia, acute asthma exacerbation, chest pain, DM 2, HTN.  START MEDS: lisinopril, prednisone.  CHANGE MEDS: lantus.  Monitor blood sugar and BP outpatient, follow up with Pulmonology, PCP with BMP and MTM referral.  Discharged home.     Type 2 Diabetes:    Pt currently taking Metformin ER 1000 mg twice daily, Glimepiride 4 mg twice daily, Lantus 25 units twice daily. Pt is not experiencing side effects.  SMB time(s) daily. Ranges (patient reported):  AM fasting.    Symptoms of low blood sugar? shaky, dizzy, weak, sweaty, Frequency of lows- no symptoms recently.   Symptoms of high blood sugar? none  Eye exam: {up to date:688537}  Foot exam: {up to date:976514}  Diet/Exercise: Dinner - varies a lot.  Has different types of meat, a potatoes, a salad, veggie.  He likes sweets, and doesn't have a lot of will power.  He is hungry all of  "the time.  She is thinking about doing   Statin: Yes: Atorvastatin 10 mg   ACEi/ARB: Yes: Lisinopril 10 mg daily.   Urine Albumin: No results found for: UMALCR   {IMMUNIZATION REMINDER LOOK IN NAVIGATOR:613090}     04/28/2020 11:39:00 Result Recd: 04/28/2020 12:16:00  Report: 04/28/2020 12:14:00      Requesting Physician: John Pelaez Ordering Physician: John Pelaez      GLYCOSYLATED HGB A1C      NAME VALUE REFERENCE RANGE LAB   F HGB A1C 9.1 H         Back Pain: Currently taking Gabapentin 1200 mg twice daily.      Asthma:    Current asthma medications: Short-Acting Bronchodilator: Albuterol MDI 1 puff twice daily, Ipratropium MDI. {Steroid inhaler -- Delete if patient does not use steroid:281656} Asthma triggers include: smoke, pollens, animal dander and strong odors and fumes.  Breathing is a lot better with the prednisone.  He is getting phlegm in his throat.  He usually get breathing issue in the spring.  He is noticing some small amount draining his.  Pt reports the following symptoms: {COPD SYMPTOMS:423002}.  AAP on file: {YES - DATE/NO:559975::\"NO\"}  No flowsheet data found.  {IMMUNIZATION REMINDER LOOK IN NAVIGATOR:697098}  ***: ***  ***: ***  ***: ***  ***: ***    Today's Vitals: There were no vitals taken for this visit.      ASSESSMENT:                          {mtmpartdquestion:580165}    Medication Adherence: {adherenceassess:972431}, {ADHERENCEOPTIONSASSES:690065}    ***: ***  ***: ***  ***: ***  ***: ***  ***: ***    PLAN:                          {Remind patient about MTM survey:679873}{AL?:767982}    1.  Consider changing Gabapentin 800mg am, 1600 PM  2.  Ventolin and atrovent refills.  2 puffs BID for 2 weeks,   3. Recommend 2 hours after the largest meal of the day.  Nutritsystem    Aspirin 81 mg?  4.  Send list of GLP1:  Trulicity, Ozempic, victoza     I spent {time:825095} with this patient today{MTMpartdbillingquestion:491948}. { :832829}. A copy of the visit note was provided to the " "patient's {[facility] chart:356290} provider.    Will follow up in ***.    The patient {GIVEN/NOT GIVEN:382824::\"was given\"} a summary of these recommendations. {covisit:717665}    ***          "